# Patient Record
Sex: MALE | Race: WHITE | NOT HISPANIC OR LATINO | Employment: FULL TIME | URBAN - METROPOLITAN AREA
[De-identification: names, ages, dates, MRNs, and addresses within clinical notes are randomized per-mention and may not be internally consistent; named-entity substitution may affect disease eponyms.]

---

## 2017-01-03 ENCOUNTER — COMMUNICATION - HEALTHEAST (OUTPATIENT)
Dept: FAMILY MEDICINE | Facility: CLINIC | Age: 47
End: 2017-01-03

## 2017-02-02 ENCOUNTER — COMMUNICATION - HEALTHEAST (OUTPATIENT)
Dept: FAMILY MEDICINE | Facility: CLINIC | Age: 47
End: 2017-02-02

## 2017-02-02 DIAGNOSIS — N52.9 MALE ERECTILE DISORDER: ICD-10-CM

## 2017-02-14 ENCOUNTER — COMMUNICATION - HEALTHEAST (OUTPATIENT)
Dept: FAMILY MEDICINE | Facility: CLINIC | Age: 47
End: 2017-02-14

## 2017-02-14 DIAGNOSIS — E11.9 TYPE 2 DIABETES MELLITUS (H): ICD-10-CM

## 2017-03-14 ENCOUNTER — COMMUNICATION - HEALTHEAST (OUTPATIENT)
Dept: FAMILY MEDICINE | Facility: CLINIC | Age: 47
End: 2017-03-14

## 2017-03-14 DIAGNOSIS — E11.9 TYPE 2 DIABETES MELLITUS (H): ICD-10-CM

## 2017-03-14 DIAGNOSIS — E78.5 HYPERLIPEMIA: ICD-10-CM

## 2017-03-18 ENCOUNTER — COMMUNICATION - HEALTHEAST (OUTPATIENT)
Dept: FAMILY MEDICINE | Facility: CLINIC | Age: 47
End: 2017-03-18

## 2017-03-18 DIAGNOSIS — E78.5 HYPERLIPEMIA: ICD-10-CM

## 2017-03-20 ENCOUNTER — RECORDS - HEALTHEAST (OUTPATIENT)
Dept: ADMINISTRATIVE | Facility: OTHER | Age: 47
End: 2017-03-20

## 2017-03-29 ENCOUNTER — COMMUNICATION - HEALTHEAST (OUTPATIENT)
Dept: FAMILY MEDICINE | Facility: CLINIC | Age: 47
End: 2017-03-29

## 2017-03-29 DIAGNOSIS — I10 HTN (HYPERTENSION): ICD-10-CM

## 2017-04-22 ENCOUNTER — COMMUNICATION - HEALTHEAST (OUTPATIENT)
Dept: FAMILY MEDICINE | Facility: CLINIC | Age: 47
End: 2017-04-22

## 2017-04-22 DIAGNOSIS — N52.9 MALE ERECTILE DISORDER: ICD-10-CM

## 2017-07-16 ENCOUNTER — COMMUNICATION - HEALTHEAST (OUTPATIENT)
Dept: FAMILY MEDICINE | Facility: CLINIC | Age: 47
End: 2017-07-16

## 2017-07-16 DIAGNOSIS — E11.9 TYPE 2 DIABETES MELLITUS (H): ICD-10-CM

## 2017-09-04 ENCOUNTER — COMMUNICATION - HEALTHEAST (OUTPATIENT)
Dept: FAMILY MEDICINE | Facility: CLINIC | Age: 47
End: 2017-09-04

## 2017-09-04 DIAGNOSIS — E11.9 TYPE 2 DIABETES MELLITUS (H): ICD-10-CM

## 2017-09-04 DIAGNOSIS — E78.5 HYPERLIPEMIA: ICD-10-CM

## 2017-09-26 ENCOUNTER — COMMUNICATION - HEALTHEAST (OUTPATIENT)
Dept: FAMILY MEDICINE | Facility: CLINIC | Age: 47
End: 2017-09-26

## 2017-09-26 DIAGNOSIS — I10 HTN (HYPERTENSION): ICD-10-CM

## 2017-09-30 ENCOUNTER — COMMUNICATION - HEALTHEAST (OUTPATIENT)
Dept: FAMILY MEDICINE | Facility: CLINIC | Age: 47
End: 2017-09-30

## 2017-09-30 DIAGNOSIS — N52.9 MALE ERECTILE DISORDER: ICD-10-CM

## 2017-09-30 DIAGNOSIS — E11.9 TYPE 2 DIABETES MELLITUS (H): ICD-10-CM

## 2017-10-15 ENCOUNTER — COMMUNICATION - HEALTHEAST (OUTPATIENT)
Dept: FAMILY MEDICINE | Facility: CLINIC | Age: 47
End: 2017-10-15

## 2017-10-15 DIAGNOSIS — E11.9 TYPE 2 DIABETES MELLITUS (H): ICD-10-CM

## 2017-11-20 ENCOUNTER — OFFICE VISIT - HEALTHEAST (OUTPATIENT)
Dept: FAMILY MEDICINE | Facility: CLINIC | Age: 47
End: 2017-11-20

## 2017-11-20 ENCOUNTER — COMMUNICATION - HEALTHEAST (OUTPATIENT)
Dept: INTERNAL MEDICINE | Facility: CLINIC | Age: 47
End: 2017-11-20

## 2017-11-20 DIAGNOSIS — I10 ESSENTIAL HYPERTENSION: ICD-10-CM

## 2017-11-20 DIAGNOSIS — E78.5 HYPERLIPIDEMIA: ICD-10-CM

## 2017-11-20 DIAGNOSIS — L40.8 OTHER PSORIASIS: ICD-10-CM

## 2017-11-20 DIAGNOSIS — F52.8 PSYCHOSEXUAL DYSFUNCTION WITH INHIBITED SEXUAL EXCITEMENT: ICD-10-CM

## 2017-11-20 DIAGNOSIS — E11.9 TYPE 2 DIABETES MELLITUS (H): ICD-10-CM

## 2017-11-20 LAB
CHOLEST SERPL-MCNC: 126 MG/DL
FASTING STATUS PATIENT QL REPORTED: YES
HBA1C MFR BLD: 7.4 % (ref 3.5–6)
HDLC SERPL-MCNC: 47 MG/DL
LDLC SERPL CALC-MCNC: 59 MG/DL
TRIGL SERPL-MCNC: 100 MG/DL

## 2017-11-30 ENCOUNTER — COMMUNICATION - HEALTHEAST (OUTPATIENT)
Dept: FAMILY MEDICINE | Facility: CLINIC | Age: 47
End: 2017-11-30

## 2017-11-30 DIAGNOSIS — E78.5 HYPERLIPEMIA: ICD-10-CM

## 2017-12-12 ENCOUNTER — COMMUNICATION - HEALTHEAST (OUTPATIENT)
Dept: FAMILY MEDICINE | Facility: CLINIC | Age: 47
End: 2017-12-12

## 2017-12-12 DIAGNOSIS — I10 HTN (HYPERTENSION): ICD-10-CM

## 2018-01-15 ENCOUNTER — COMMUNICATION - HEALTHEAST (OUTPATIENT)
Dept: FAMILY MEDICINE | Facility: CLINIC | Age: 48
End: 2018-01-15

## 2018-01-15 DIAGNOSIS — E11.9 TYPE 2 DIABETES MELLITUS (H): ICD-10-CM

## 2018-02-10 ENCOUNTER — COMMUNICATION - HEALTHEAST (OUTPATIENT)
Dept: FAMILY MEDICINE | Facility: CLINIC | Age: 48
End: 2018-02-10

## 2018-02-10 DIAGNOSIS — E11.9 TYPE 2 DIABETES MELLITUS (H): ICD-10-CM

## 2018-03-18 ENCOUNTER — COMMUNICATION - HEALTHEAST (OUTPATIENT)
Dept: FAMILY MEDICINE | Facility: CLINIC | Age: 48
End: 2018-03-18

## 2018-03-18 DIAGNOSIS — I10 HTN (HYPERTENSION): ICD-10-CM

## 2018-04-17 ENCOUNTER — COMMUNICATION - HEALTHEAST (OUTPATIENT)
Dept: FAMILY MEDICINE | Facility: CLINIC | Age: 48
End: 2018-04-17

## 2018-04-17 DIAGNOSIS — E11.9 TYPE 2 DIABETES MELLITUS (H): ICD-10-CM

## 2018-05-16 ENCOUNTER — COMMUNICATION - HEALTHEAST (OUTPATIENT)
Dept: FAMILY MEDICINE | Facility: CLINIC | Age: 48
End: 2018-05-16

## 2018-05-16 DIAGNOSIS — E11.9 TYPE 2 DIABETES MELLITUS (H): ICD-10-CM

## 2018-06-14 ENCOUNTER — RECORDS - HEALTHEAST (OUTPATIENT)
Dept: ADMINISTRATIVE | Facility: OTHER | Age: 48
End: 2018-06-14

## 2018-06-15 ENCOUNTER — OFFICE VISIT - HEALTHEAST (OUTPATIENT)
Dept: FAMILY MEDICINE | Facility: CLINIC | Age: 48
End: 2018-06-15

## 2018-06-15 DIAGNOSIS — E11.9 TYPE 2 DIABETES MELLITUS (H): ICD-10-CM

## 2018-06-15 DIAGNOSIS — Z12.11 SCREEN FOR COLON CANCER: ICD-10-CM

## 2018-06-15 DIAGNOSIS — I10 ESSENTIAL HYPERTENSION: ICD-10-CM

## 2018-06-15 DIAGNOSIS — E78.5 HYPERLIPIDEMIA: ICD-10-CM

## 2018-06-15 LAB
ALBUMIN SERPL-MCNC: 3.8 G/DL (ref 3.5–5)
ALP SERPL-CCNC: 65 U/L (ref 45–120)
ALT SERPL W P-5'-P-CCNC: 41 U/L (ref 0–45)
ANION GAP SERPL CALCULATED.3IONS-SCNC: 8 MMOL/L (ref 5–18)
AST SERPL W P-5'-P-CCNC: 32 U/L (ref 0–40)
BILIRUB SERPL-MCNC: 0.6 MG/DL (ref 0–1)
BUN SERPL-MCNC: 6 MG/DL (ref 8–22)
CALCIUM SERPL-MCNC: 9.6 MG/DL (ref 8.5–10.5)
CHLORIDE BLD-SCNC: 104 MMOL/L (ref 98–107)
CHOLEST SERPL-MCNC: 128 MG/DL
CO2 SERPL-SCNC: 26 MMOL/L (ref 22–31)
CREAT SERPL-MCNC: 1.15 MG/DL (ref 0.7–1.3)
CREAT UR-MCNC: 162.2 MG/DL
FASTING STATUS PATIENT QL REPORTED: YES
GFR SERPL CREATININE-BSD FRML MDRD: >60 ML/MIN/1.73M2
GLUCOSE BLD-MCNC: 204 MG/DL (ref 70–125)
HBA1C MFR BLD: 8.5 % (ref 3.5–6)
HDLC SERPL-MCNC: 51 MG/DL
LDLC SERPL CALC-MCNC: 55 MG/DL
MICROALBUMIN UR-MCNC: 1.44 MG/DL (ref 0–1.99)
MICROALBUMIN/CREAT UR: 8.9 MG/G
POTASSIUM BLD-SCNC: 4.9 MMOL/L (ref 3.5–5)
PROT SERPL-MCNC: 6.6 G/DL (ref 6–8)
SODIUM SERPL-SCNC: 138 MMOL/L (ref 136–145)
TRIGL SERPL-MCNC: 108 MG/DL

## 2018-06-18 ENCOUNTER — AMBULATORY - HEALTHEAST (OUTPATIENT)
Dept: FAMILY MEDICINE | Facility: CLINIC | Age: 48
End: 2018-06-18

## 2018-06-18 ENCOUNTER — COMMUNICATION - HEALTHEAST (OUTPATIENT)
Dept: FAMILY MEDICINE | Facility: CLINIC | Age: 48
End: 2018-06-18

## 2018-06-18 DIAGNOSIS — E11.9 TYPE 2 DIABETES MELLITUS (H): ICD-10-CM

## 2018-07-15 ENCOUNTER — COMMUNICATION - HEALTHEAST (OUTPATIENT)
Dept: FAMILY MEDICINE | Facility: CLINIC | Age: 48
End: 2018-07-15

## 2018-07-15 DIAGNOSIS — E11.9 TYPE 2 DIABETES MELLITUS (H): ICD-10-CM

## 2018-08-15 ENCOUNTER — COMMUNICATION - HEALTHEAST (OUTPATIENT)
Dept: FAMILY MEDICINE | Facility: CLINIC | Age: 48
End: 2018-08-15

## 2018-08-15 DIAGNOSIS — E11.9 TYPE 2 DIABETES MELLITUS (H): ICD-10-CM

## 2018-08-22 ENCOUNTER — COMMUNICATION - HEALTHEAST (OUTPATIENT)
Dept: FAMILY MEDICINE | Facility: CLINIC | Age: 48
End: 2018-08-22

## 2018-08-22 DIAGNOSIS — E78.5 HYPERLIPEMIA: ICD-10-CM

## 2018-10-01 ENCOUNTER — COMMUNICATION - HEALTHEAST (OUTPATIENT)
Dept: FAMILY MEDICINE | Facility: CLINIC | Age: 48
End: 2018-10-01

## 2018-10-01 DIAGNOSIS — I10 HTN (HYPERTENSION): ICD-10-CM

## 2018-10-01 DIAGNOSIS — E78.5 HYPERLIPEMIA: ICD-10-CM

## 2018-10-01 DIAGNOSIS — N52.9 MALE ERECTILE DISORDER: ICD-10-CM

## 2018-10-01 DIAGNOSIS — E11.9 TYPE 2 DIABETES MELLITUS (H): ICD-10-CM

## 2018-11-13 ENCOUNTER — COMMUNICATION - HEALTHEAST (OUTPATIENT)
Dept: FAMILY MEDICINE | Facility: CLINIC | Age: 48
End: 2018-11-13

## 2018-11-13 DIAGNOSIS — E11.9 TYPE 2 DIABETES MELLITUS (H): ICD-10-CM

## 2018-12-17 ENCOUNTER — OFFICE VISIT - HEALTHEAST (OUTPATIENT)
Dept: FAMILY MEDICINE | Facility: CLINIC | Age: 48
End: 2018-12-17

## 2018-12-17 DIAGNOSIS — E11.9 TYPE 2 DIABETES MELLITUS WITHOUT COMPLICATION, UNSPECIFIED WHETHER LONG TERM INSULIN USE (H): ICD-10-CM

## 2018-12-17 DIAGNOSIS — Z12.5 SCREENING FOR PROSTATE CANCER: ICD-10-CM

## 2018-12-17 DIAGNOSIS — I10 ESSENTIAL HYPERTENSION: ICD-10-CM

## 2018-12-17 DIAGNOSIS — E78.49 OTHER HYPERLIPIDEMIA: ICD-10-CM

## 2018-12-17 DIAGNOSIS — Z12.11 SCREEN FOR COLON CANCER: ICD-10-CM

## 2018-12-17 LAB
ALBUMIN SERPL-MCNC: 3.9 G/DL (ref 3.5–5)
ALP SERPL-CCNC: 63 U/L (ref 45–120)
ALT SERPL W P-5'-P-CCNC: 41 U/L (ref 0–45)
ANION GAP SERPL CALCULATED.3IONS-SCNC: 13 MMOL/L (ref 5–18)
AST SERPL W P-5'-P-CCNC: 29 U/L (ref 0–40)
BILIRUB SERPL-MCNC: 0.4 MG/DL (ref 0–1)
BUN SERPL-MCNC: 9 MG/DL (ref 8–22)
CALCIUM SERPL-MCNC: 9.4 MG/DL (ref 8.5–10.5)
CHLORIDE BLD-SCNC: 102 MMOL/L (ref 98–107)
CHOLEST SERPL-MCNC: 125 MG/DL
CO2 SERPL-SCNC: 23 MMOL/L (ref 22–31)
CREAT SERPL-MCNC: 1.03 MG/DL (ref 0.7–1.3)
FASTING STATUS PATIENT QL REPORTED: YES
GFR SERPL CREATININE-BSD FRML MDRD: >60 ML/MIN/1.73M2
GLUCOSE BLD-MCNC: 209 MG/DL (ref 70–125)
HBA1C MFR BLD: 8.5 % (ref 3.5–6)
HDLC SERPL-MCNC: 48 MG/DL
LDLC SERPL CALC-MCNC: 55 MG/DL
POTASSIUM BLD-SCNC: 4.5 MMOL/L (ref 3.5–5)
PROT SERPL-MCNC: 6.7 G/DL (ref 6–8)
PSA SERPL-MCNC: 0.4 NG/ML (ref 0–3.5)
SODIUM SERPL-SCNC: 138 MMOL/L (ref 136–145)
TRIGL SERPL-MCNC: 110 MG/DL

## 2018-12-19 ENCOUNTER — AMBULATORY - HEALTHEAST (OUTPATIENT)
Dept: FAMILY MEDICINE | Facility: CLINIC | Age: 48
End: 2018-12-19

## 2018-12-19 ENCOUNTER — COMMUNICATION - HEALTHEAST (OUTPATIENT)
Dept: FAMILY MEDICINE | Facility: CLINIC | Age: 48
End: 2018-12-19

## 2018-12-19 DIAGNOSIS — E11.9 TYPE 2 DIABETES MELLITUS WITHOUT COMPLICATION, UNSPECIFIED WHETHER LONG TERM INSULIN USE (H): ICD-10-CM

## 2018-12-21 ENCOUNTER — COMMUNICATION - HEALTHEAST (OUTPATIENT)
Dept: PHARMACY | Facility: CLINIC | Age: 48
End: 2018-12-21

## 2018-12-26 ENCOUNTER — COMMUNICATION - HEALTHEAST (OUTPATIENT)
Dept: FAMILY MEDICINE | Facility: CLINIC | Age: 48
End: 2018-12-26

## 2018-12-26 DIAGNOSIS — E11.9 TYPE 2 DIABETES MELLITUS (H): ICD-10-CM

## 2019-03-16 ENCOUNTER — COMMUNICATION - HEALTHEAST (OUTPATIENT)
Dept: FAMILY MEDICINE | Facility: CLINIC | Age: 49
End: 2019-03-16

## 2019-03-16 DIAGNOSIS — N52.9 MALE ERECTILE DISORDER: ICD-10-CM

## 2019-03-26 ENCOUNTER — COMMUNICATION - HEALTHEAST (OUTPATIENT)
Dept: FAMILY MEDICINE | Facility: CLINIC | Age: 49
End: 2019-03-26

## 2019-03-26 DIAGNOSIS — I10 HTN (HYPERTENSION): ICD-10-CM

## 2019-06-14 ENCOUNTER — COMMUNICATION - HEALTHEAST (OUTPATIENT)
Dept: FAMILY MEDICINE | Facility: CLINIC | Age: 49
End: 2019-06-14

## 2019-06-14 DIAGNOSIS — E11.9 TYPE 2 DIABETES MELLITUS (H): ICD-10-CM

## 2019-07-08 ENCOUNTER — RECORDS - HEALTHEAST (OUTPATIENT)
Dept: ADMINISTRATIVE | Facility: OTHER | Age: 49
End: 2019-07-08

## 2019-07-10 ENCOUNTER — RECORDS - HEALTHEAST (OUTPATIENT)
Dept: HEALTH INFORMATION MANAGEMENT | Facility: CLINIC | Age: 49
End: 2019-07-10

## 2019-07-12 ENCOUNTER — COMMUNICATION - HEALTHEAST (OUTPATIENT)
Dept: FAMILY MEDICINE | Facility: CLINIC | Age: 49
End: 2019-07-12

## 2019-07-12 ENCOUNTER — OFFICE VISIT - HEALTHEAST (OUTPATIENT)
Dept: FAMILY MEDICINE | Facility: CLINIC | Age: 49
End: 2019-07-12

## 2019-07-12 DIAGNOSIS — E11.9 TYPE 2 DIABETES MELLITUS WITHOUT COMPLICATION, UNSPECIFIED WHETHER LONG TERM INSULIN USE (H): ICD-10-CM

## 2019-07-12 DIAGNOSIS — I10 ESSENTIAL HYPERTENSION: ICD-10-CM

## 2019-07-12 DIAGNOSIS — E78.49 OTHER HYPERLIPIDEMIA: ICD-10-CM

## 2019-07-12 DIAGNOSIS — Z12.11 SCREEN FOR COLON CANCER: ICD-10-CM

## 2019-07-12 LAB
ALBUMIN SERPL-MCNC: 4.2 G/DL (ref 3.5–5)
ALP SERPL-CCNC: 50 U/L (ref 45–120)
ALT SERPL W P-5'-P-CCNC: 22 U/L (ref 0–45)
ANION GAP SERPL CALCULATED.3IONS-SCNC: 10 MMOL/L (ref 5–18)
AST SERPL W P-5'-P-CCNC: 18 U/L (ref 0–40)
BILIRUB SERPL-MCNC: 0.7 MG/DL (ref 0–1)
BUN SERPL-MCNC: 16 MG/DL (ref 8–22)
CALCIUM SERPL-MCNC: 10.3 MG/DL (ref 8.5–10.5)
CHLORIDE BLD-SCNC: 102 MMOL/L (ref 98–107)
CHOLEST SERPL-MCNC: 132 MG/DL
CO2 SERPL-SCNC: 26 MMOL/L (ref 22–31)
CREAT SERPL-MCNC: 1.2 MG/DL (ref 0.7–1.3)
CREAT UR-MCNC: 197.5 MG/DL
FASTING STATUS PATIENT QL REPORTED: YES
GFR SERPL CREATININE-BSD FRML MDRD: >60 ML/MIN/1.73M2
GLUCOSE BLD-MCNC: 141 MG/DL (ref 70–125)
HBA1C MFR BLD: 5.8 % (ref 3.5–6)
HDLC SERPL-MCNC: 53 MG/DL
LDLC SERPL CALC-MCNC: 61 MG/DL
MICROALBUMIN UR-MCNC: 0.61 MG/DL (ref 0–1.99)
MICROALBUMIN/CREAT UR: 3.1 MG/G
POTASSIUM BLD-SCNC: 4.5 MMOL/L (ref 3.5–5)
PROT SERPL-MCNC: 6.9 G/DL (ref 6–8)
SODIUM SERPL-SCNC: 138 MMOL/L (ref 136–145)
TRIGL SERPL-MCNC: 88 MG/DL

## 2019-07-26 ENCOUNTER — COMMUNICATION - HEALTHEAST (OUTPATIENT)
Dept: FAMILY MEDICINE | Facility: CLINIC | Age: 49
End: 2019-07-26

## 2019-08-24 ENCOUNTER — COMMUNICATION - HEALTHEAST (OUTPATIENT)
Dept: FAMILY MEDICINE | Facility: CLINIC | Age: 49
End: 2019-08-24

## 2019-08-24 DIAGNOSIS — E11.9 TYPE 2 DIABETES MELLITUS (H): ICD-10-CM

## 2019-09-12 ENCOUNTER — COMMUNICATION - HEALTHEAST (OUTPATIENT)
Dept: FAMILY MEDICINE | Facility: CLINIC | Age: 49
End: 2019-09-12

## 2019-09-12 DIAGNOSIS — E78.5 HYPERLIPEMIA: ICD-10-CM

## 2019-09-20 ENCOUNTER — COMMUNICATION - HEALTHEAST (OUTPATIENT)
Dept: FAMILY MEDICINE | Facility: CLINIC | Age: 49
End: 2019-09-20

## 2019-09-20 DIAGNOSIS — I10 HTN (HYPERTENSION): ICD-10-CM

## 2019-12-06 ENCOUNTER — COMMUNICATION - HEALTHEAST (OUTPATIENT)
Dept: FAMILY MEDICINE | Facility: CLINIC | Age: 49
End: 2019-12-06

## 2019-12-06 DIAGNOSIS — E11.9 TYPE 2 DIABETES MELLITUS (H): ICD-10-CM

## 2020-06-04 ENCOUNTER — COMMUNICATION - HEALTHEAST (OUTPATIENT)
Dept: FAMILY MEDICINE | Facility: CLINIC | Age: 50
End: 2020-06-04

## 2020-06-04 DIAGNOSIS — E11.9 TYPE 2 DIABETES MELLITUS (H): ICD-10-CM

## 2020-06-15 ENCOUNTER — COMMUNICATION - HEALTHEAST (OUTPATIENT)
Dept: FAMILY MEDICINE | Facility: CLINIC | Age: 50
End: 2020-06-15

## 2020-06-15 DIAGNOSIS — N52.9 MALE ERECTILE DISORDER: ICD-10-CM

## 2020-07-02 ENCOUNTER — COMMUNICATION - HEALTHEAST (OUTPATIENT)
Dept: FAMILY MEDICINE | Facility: CLINIC | Age: 50
End: 2020-07-02

## 2020-07-02 DIAGNOSIS — E11.9 TYPE 2 DIABETES MELLITUS (H): ICD-10-CM

## 2020-07-13 ENCOUNTER — OFFICE VISIT - HEALTHEAST (OUTPATIENT)
Dept: FAMILY MEDICINE | Facility: CLINIC | Age: 50
End: 2020-07-13

## 2020-07-13 DIAGNOSIS — N52.9 MALE ERECTILE DISORDER: ICD-10-CM

## 2020-07-13 DIAGNOSIS — I10 ESSENTIAL HYPERTENSION: ICD-10-CM

## 2020-07-13 DIAGNOSIS — Z12.11 SCREEN FOR COLON CANCER: ICD-10-CM

## 2020-07-13 DIAGNOSIS — E11.9 TYPE 2 DIABETES MELLITUS WITHOUT COMPLICATION, UNSPECIFIED WHETHER LONG TERM INSULIN USE (H): ICD-10-CM

## 2020-07-13 DIAGNOSIS — F52.8 PSYCHOSEXUAL DYSFUNCTION WITH INHIBITED SEXUAL EXCITEMENT: ICD-10-CM

## 2020-07-13 DIAGNOSIS — E78.49 OTHER HYPERLIPIDEMIA: ICD-10-CM

## 2020-07-13 RX ORDER — TADALAFIL 20 MG/1
TABLET ORAL
Qty: 8 TABLET | Refills: 5 | Status: SHIPPED | OUTPATIENT
Start: 2020-07-13 | End: 2021-08-20

## 2020-08-04 ENCOUNTER — COMMUNICATION - HEALTHEAST (OUTPATIENT)
Dept: FAMILY MEDICINE | Facility: CLINIC | Age: 50
End: 2020-08-04

## 2020-08-04 DIAGNOSIS — E11.9 TYPE 2 DIABETES MELLITUS (H): ICD-10-CM

## 2020-08-14 ENCOUNTER — COMMUNICATION - HEALTHEAST (OUTPATIENT)
Dept: FAMILY MEDICINE | Facility: CLINIC | Age: 50
End: 2020-08-14

## 2020-08-14 ENCOUNTER — AMBULATORY - HEALTHEAST (OUTPATIENT)
Dept: LAB | Facility: CLINIC | Age: 50
End: 2020-08-14

## 2020-08-14 DIAGNOSIS — E11.9 TYPE 2 DIABETES MELLITUS WITHOUT COMPLICATION, UNSPECIFIED WHETHER LONG TERM INSULIN USE (H): ICD-10-CM

## 2020-08-14 DIAGNOSIS — E78.49 OTHER HYPERLIPIDEMIA: ICD-10-CM

## 2020-08-14 DIAGNOSIS — E11.9 TYPE 2 DIABETES MELLITUS (H): ICD-10-CM

## 2020-08-14 LAB
ALBUMIN SERPL-MCNC: 4 G/DL (ref 3.5–5)
ALP SERPL-CCNC: 53 U/L (ref 45–120)
ALT SERPL W P-5'-P-CCNC: 25 U/L (ref 0–45)
ANION GAP SERPL CALCULATED.3IONS-SCNC: 5 MMOL/L (ref 5–18)
AST SERPL W P-5'-P-CCNC: 16 U/L (ref 0–40)
BILIRUB SERPL-MCNC: 0.5 MG/DL (ref 0–1)
BUN SERPL-MCNC: 10 MG/DL (ref 8–22)
CALCIUM SERPL-MCNC: 9.3 MG/DL (ref 8.5–10.5)
CHLORIDE BLD-SCNC: 103 MMOL/L (ref 98–107)
CHOLEST SERPL-MCNC: 137 MG/DL
CO2 SERPL-SCNC: 30 MMOL/L (ref 22–31)
CREAT SERPL-MCNC: 1.09 MG/DL (ref 0.7–1.3)
FASTING STATUS PATIENT QL REPORTED: YES
GFR SERPL CREATININE-BSD FRML MDRD: >60 ML/MIN/1.73M2
GLUCOSE BLD-MCNC: 169 MG/DL (ref 70–125)
HBA1C MFR BLD: 7.4 %
HDLC SERPL-MCNC: 53 MG/DL
LDLC SERPL CALC-MCNC: 61 MG/DL
POTASSIUM BLD-SCNC: 4.6 MMOL/L (ref 3.5–5)
PROT SERPL-MCNC: 6.6 G/DL (ref 6–8)
SODIUM SERPL-SCNC: 138 MMOL/L (ref 136–145)
TRIGL SERPL-MCNC: 115 MG/DL

## 2020-08-14 RX ORDER — GLIPIZIDE 10 MG/1
TABLET, FILM COATED, EXTENDED RELEASE ORAL
Qty: 180 TABLET | Refills: 3 | Status: SHIPPED | OUTPATIENT
Start: 2020-08-14 | End: 2021-08-20

## 2020-08-16 LAB
CREAT UR-MCNC: 76.2 MG/DL
MICROALBUMIN UR-MCNC: <0.5 MG/DL (ref 0–1.99)
MICROALBUMIN/CREAT UR: NORMAL MG/G{CREAT}

## 2020-08-17 ENCOUNTER — COMMUNICATION - HEALTHEAST (OUTPATIENT)
Dept: FAMILY MEDICINE | Facility: CLINIC | Age: 50
End: 2020-08-17

## 2020-09-11 ENCOUNTER — COMMUNICATION - HEALTHEAST (OUTPATIENT)
Dept: SCHEDULING | Facility: CLINIC | Age: 50
End: 2020-09-11

## 2020-09-11 DIAGNOSIS — E78.5 HYPERLIPEMIA: ICD-10-CM

## 2020-09-12 RX ORDER — SIMVASTATIN 40 MG
40 TABLET ORAL DAILY
Qty: 90 TABLET | Refills: 3 | Status: SHIPPED | OUTPATIENT
Start: 2020-09-12 | End: 2021-08-20

## 2020-09-18 ENCOUNTER — COMMUNICATION - HEALTHEAST (OUTPATIENT)
Dept: FAMILY MEDICINE | Facility: CLINIC | Age: 50
End: 2020-09-18

## 2020-09-18 DIAGNOSIS — I10 HTN (HYPERTENSION): ICD-10-CM

## 2020-09-21 RX ORDER — LISINOPRIL 20 MG/1
20 TABLET ORAL DAILY
Qty: 90 TABLET | Refills: 3 | Status: SHIPPED | OUTPATIENT
Start: 2020-09-21 | End: 2021-08-20

## 2020-10-30 ENCOUNTER — COMMUNICATION - HEALTHEAST (OUTPATIENT)
Dept: FAMILY MEDICINE | Facility: CLINIC | Age: 50
End: 2020-10-30

## 2020-10-30 DIAGNOSIS — E11.9 TYPE 2 DIABETES MELLITUS (H): ICD-10-CM

## 2020-12-09 ENCOUNTER — COMMUNICATION - HEALTHEAST (OUTPATIENT)
Dept: FAMILY MEDICINE | Facility: CLINIC | Age: 50
End: 2020-12-09

## 2021-01-25 ENCOUNTER — OFFICE VISIT - HEALTHEAST (OUTPATIENT)
Dept: FAMILY MEDICINE | Facility: CLINIC | Age: 51
End: 2021-01-25

## 2021-01-25 DIAGNOSIS — E11.9 TYPE 2 DIABETES MELLITUS WITHOUT COMPLICATION, UNSPECIFIED WHETHER LONG TERM INSULIN USE (H): ICD-10-CM

## 2021-01-25 DIAGNOSIS — I10 ESSENTIAL HYPERTENSION: ICD-10-CM

## 2021-01-25 DIAGNOSIS — E78.49 OTHER HYPERLIPIDEMIA: ICD-10-CM

## 2021-01-25 DIAGNOSIS — Z12.11 SCREEN FOR COLON CANCER: ICD-10-CM

## 2021-01-25 LAB
ALBUMIN SERPL-MCNC: 4.2 G/DL (ref 3.5–5)
ALP SERPL-CCNC: 56 U/L (ref 45–120)
ALT SERPL W P-5'-P-CCNC: 19 U/L (ref 0–45)
ANION GAP SERPL CALCULATED.3IONS-SCNC: 8 MMOL/L (ref 5–18)
AST SERPL W P-5'-P-CCNC: 16 U/L (ref 0–40)
BILIRUB SERPL-MCNC: 0.9 MG/DL (ref 0–1)
BUN SERPL-MCNC: 10 MG/DL (ref 8–22)
CALCIUM SERPL-MCNC: 9.6 MG/DL (ref 8.5–10.5)
CHLORIDE BLD-SCNC: 102 MMOL/L (ref 98–107)
CHOLEST SERPL-MCNC: 131 MG/DL
CO2 SERPL-SCNC: 29 MMOL/L (ref 22–31)
CREAT SERPL-MCNC: 1.16 MG/DL (ref 0.7–1.3)
FASTING STATUS PATIENT QL REPORTED: YES
GFR SERPL CREATININE-BSD FRML MDRD: >60 ML/MIN/1.73M2
GLUCOSE BLD-MCNC: 151 MG/DL (ref 70–125)
HBA1C MFR BLD: 7 %
HDLC SERPL-MCNC: 51 MG/DL
LDLC SERPL CALC-MCNC: 58 MG/DL
POTASSIUM BLD-SCNC: 4.3 MMOL/L (ref 3.5–5)
PROT SERPL-MCNC: 6.8 G/DL (ref 6–8)
SODIUM SERPL-SCNC: 139 MMOL/L (ref 136–145)
TRIGL SERPL-MCNC: 111 MG/DL

## 2021-01-26 ENCOUNTER — COMMUNICATION - HEALTHEAST (OUTPATIENT)
Dept: INTERNAL MEDICINE | Facility: CLINIC | Age: 51
End: 2021-01-26

## 2021-03-29 ENCOUNTER — COMMUNICATION - HEALTHEAST (OUTPATIENT)
Dept: FAMILY MEDICINE | Facility: CLINIC | Age: 51
End: 2021-03-29

## 2021-05-27 NOTE — TELEPHONE ENCOUNTER
Refill Approved    Rx renewed per Medication Renewal Policy. Medication was last renewed on 10/3/18.    Kim Simmons, [facility] Triage/Med Refill 3/26/2019     Requested Prescriptions   Pending Prescriptions Disp Refills     lisinopril (PRINIVIL,ZESTRIL) 20 MG tablet 90 tablet 2     Sig: Take 1 tablet (20 mg total) by mouth daily.    Ace Inhibitors Refill Protocol Passed - 3/26/2019  1:10 PM       Passed - PCP or prescribing provider visit in past 12 months      Last office visit with prescriber/PCP: 12/17/2018 Andres Dc MD OR same dept: 12/17/2018 Andres Dc MD OR same specialty: 12/17/2018 Andres Dc MD  Last physical: Visit date not found Last MTM visit: Visit date not found   Next visit within 3 mo: Visit date not found  Next physical within 3 mo: Visit date not found  Prescriber OR PCP: Andres Dc MD  Last diagnosis associated with med order: 1. HTN (hypertension)  - lisinopril (PRINIVIL,ZESTRIL) 20 MG tablet; Take 1 tablet (20 mg total) by mouth daily.  Dispense: 90 tablet; Refill: 2    If protocol passes may refill for 12 months if within 3 months of last provider visit (or a total of 15 months).            Passed - Serum Potassium in past 12 months    Lab Results   Component Value Date    Potassium 4.5 12/17/2018            Passed - Blood pressure filed in past 12 months    BP Readings from Last 1 Encounters:   12/17/18 118/82            Passed - Serum Creatinine in past 12 months    Creatinine   Date Value Ref Range Status   12/17/2018 1.03 0.70 - 1.30 mg/dL Final

## 2021-05-29 ENCOUNTER — RECORDS - HEALTHEAST (OUTPATIENT)
Dept: ADMINISTRATIVE | Facility: CLINIC | Age: 51
End: 2021-05-29

## 2021-05-29 NOTE — TELEPHONE ENCOUNTER
Refill Approved    Rx renewed per Medication Renewal Policy. Medication was last renewed on 12/27/18  #180 R-1.    Last OV 12/17/18    Mayda Grigsby, [facility] Triage/Med Refill 6/17/2019     Requested Prescriptions   Pending Prescriptions Disp Refills     metFORMIN (GLUCOPHAGE) 1000 MG tablet [Pharmacy Med Name: METFORMIN HCL 1,000 MG TABLET] 180 tablet 0     Sig: TAKE 1 TABLET BY MOUTH TWICE A DAY WITH FOOD       Metformin Refill Protocol Passed - 6/14/2019 11:38 AM        Passed - Blood pressure in last 12 months     BP Readings from Last 1 Encounters:   12/17/18 118/82             Passed - LFT or AST or ALT in last 12 months     Albumin   Date Value Ref Range Status   12/17/2018 3.9 3.5 - 5.0 g/dL Final     Bilirubin, Total   Date Value Ref Range Status   12/17/2018 0.4 0.0 - 1.0 mg/dL Final     Alkaline Phosphatase   Date Value Ref Range Status   12/17/2018 63 45 - 120 U/L Final     AST   Date Value Ref Range Status   12/17/2018 29 0 - 40 U/L Final     ALT   Date Value Ref Range Status   12/17/2018 41 0 - 45 U/L Final     Protein, Total   Date Value Ref Range Status   12/17/2018 6.7 6.0 - 8.0 g/dL Final                Passed - GFR or Serum Creatinine in last 6 months     GFR MDRD Non Af Amer   Date Value Ref Range Status   12/17/2018 >60 >60 mL/min/1.73m2 Final     GFR MDRD Af Amer   Date Value Ref Range Status   12/17/2018 >60 >60 mL/min/1.73m2 Final             Passed - Visit with PCP or prescribing provider visit in last 6 months or next 3 months     Last office visit with prescriber/PCP: 12/17/2018 OR same dept: 12/17/2018 Andres Dc MD OR same specialty: 12/17/2018 Andres Dc MD Last physical: Visit date not found Last MTM visit: Visit date not found         Next appt within 3 mo: Visit date not found  Next physical within 3 mo: Visit date not found  Prescriber OR PCP: Andres Dc MD  Last diagnosis associated with med order: 1. Type 2 diabetes mellitus (H)  - metFORMIN (GLUCOPHAGE)  1000 MG tablet [Pharmacy Med Name: METFORMIN HCL 1,000 MG TABLET]; TAKE 1 TABLET BY MOUTH TWICE A DAY WITH FOOD  Dispense: 180 tablet; Refill: 0     If protocol passes may refill for 12 months if within 3 months of last provider visit (or a total of 15 months).           Passed - A1C in last 6 months     Hemoglobin A1c   Date Value Ref Range Status   12/17/2018 8.5 (H) 3.5 - 6.0 % Final               Passed - Microalbumin in last year      Microalbumin, Random Urine   Date Value Ref Range Status   06/15/2018 1.44 0.00 - 1.99 mg/dL Final

## 2021-05-30 NOTE — PATIENT INSTRUCTIONS - HE
I will comment on laboratory studies when all available, I am very impressed and pleased with changes and improvements to your health, see us again in 6 months.

## 2021-05-30 NOTE — PROGRESS NOTES
ASSESSMENT:  1. Type 2 diabetes mellitus  (H)  Patient has made significant improvements in diet and exercise, his A1c went from 8.5-5.8.  - Glycosylated Hemoglobin A1c  - Microalbumin, Random Urine    2. Hypertension  Well-controlled.    3. Hyperlipidemia  Historically has been well controlled.  - Comprehensive Metabolic Panel  - Lipid Cascade    4. Screen for colon cancer     - Ambulatory referral for Colonoscopy        PLAN:  1.  Laboratory studies as above.  2.  Referral for colonoscopy.  3.  Discontinue or hold glipizide for now but continue metformin.  4.  For now I am going to have the patient continue the lisinopril and atorvastatin  5.  Recommend the patient discontinue aspirin and all supplements.  6.  6-month follow-up.    Orders Placed This Encounter   Procedures     Comprehensive Metabolic Panel     Lipid Cascade     Order Specific Question:   Fasting is required?     Answer:   Yes     Glycosylated Hemoglobin A1c     Microalbumin, Random Urine     Ambulatory referral for Colonoscopy     Referral Priority:   Routine     Referral Type:   Colonoscopy     Referral Reason:   Evaluation and Treatment     Requested Specialty:   Gastroenterology     Number of Visits Requested:   1     Medications Discontinued During This Encounter   Medication Reason     metFORMIN (GLUCOPHAGE) 1000 MG tablet Duplicate order     CIALIS 20 mg tablet Therapy completed     aspirin 81 MG EC tablet Therapy completed     CALCIUM CARBONATE (CALCIUM 500 ORAL) Therapy completed     cholecalciferol, vitamin D3, 2,000 unit Tab Therapy completed     MULTIVITAMIN ORAL Therapy completed     glipiZIDE (GLUCOTROL XL) 10 MG 24 hr tablet Therapy completed       Return in about 6 months (around 1/12/2020) for Recheck.    CHIEF COMPLAINT:  Chief Complaint   Patient presents with     Diabetes     recheck        SUBJECTIVE:  Bob is a 49 y.o. male who comes in for follow-up diabetes mellitus.  The patient was seen in December his A1c was 5, since  that time he has lost a considerable amount of weight 50 pounds he is made significant improvements in his diet he is overall eating much better.  Patient never did go on a GLP-1 agonist, he would be willing to do so if his A1c still stays high but I think is likely will improve.  Eating fruits and vegetables he is cut out a lot of sugars and is exercising more and better.    His blood pressure remains well controlled.    Does need a colonoscopy and is willing to go through with this at this time.    No other significant change in his health he has not had any recent illnesses or sicknesses.    REVIEW OF SYSTEMS:      All other systems are negative.    PFSH:  Immunization History   Administered Date(s) Administered     DT (pediatric) 01/01/1999     Hep A, Adult IM (19yr & older) 12/14/2007, 07/21/2008     Hep A, historic 12/14/2007, 07/21/2008     Influenza [id number], 03/17/2010, 03/17/2010     Influenza, Seasonal, Inj PF IIV3 09/29/2010, 12/27/2011     Influenza, inj, historic,unspecified 10/12/2005, 12/14/2007, 09/30/2015, 12/30/2016     Influenza, seasonal,quad inj 36+ mos 09/30/2015, 12/30/2016, 11/20/2017     Influenza, seasonal,quad inj 6-35 mos 10/23/2013, 12/12/2014     Influenza,seasonal quad, PF, 36+MOS 12/17/2018     Influenza,seasonal, Inj IIV3 10/12/2005, 02/02/2007, 12/14/2007, 10/23/2013, 12/12/2014     Pneumo Conj 13-V (2010&after) 12/12/2014     Pneumo Polysac 23-V 08/25/2009     Td,adult,historic,unspecified 12/14/2007     Tdap 12/14/2007, 11/20/2017     Social History     Socioeconomic History     Marital status:      Spouse name: Not on file     Number of children: 3     Years of education: Not on file     Highest education level: Not on file   Occupational History     Occupation: IT Executive   Social Needs     Financial resource strain: Not on file     Food insecurity:     Worry: Not on file     Inability: Not on file     Transportation needs:     Medical: Not on file     Non-medical: Not  on file   Tobacco Use     Smoking status: Never Smoker     Smokeless tobacco: Never Used   Substance and Sexual Activity     Alcohol use: Yes     Comment: Rare     Drug use: No     Sexual activity: Yes     Partners: Female   Lifestyle     Physical activity:     Days per week: Not on file     Minutes per session: Not on file     Stress: Not on file   Relationships     Social connections:     Talks on phone: Not on file     Gets together: Not on file     Attends [employer] service: Not on file     Active member of club or organization: Not on file     Attends meetings of clubs or organizations: Not on file     Relationship status: Not on file     Intimate partner violence:     Fear of current or ex partner: Not on file     Emotionally abused: Not on file     Physically abused: Not on file     Forced sexual activity: Not on file   Other Topics Concern     Not on file   Social History Narrative    Diet-eating well        Exercise- Walking, some running, gym, basektball     Past Medical History:   Diagnosis Date     Anxiety     Previously on Zoloft     Family History   Problem Relation Age of Onset     Hyperlipidemia Father      Prostate cancer Father 52     Psoriasis Father         Severe-with arthitis disabled      Coronary artery disease Maternal Grandmother      Prostate cancer Maternal Grandfather      Diabetes type II Paternal Grandmother      Aneurysm Paternal Grandfather         Cardiovascular     Diabetes type II Paternal Uncle      Diabetes type II Paternal Uncle      Diabetes type II Paternal Uncle      Diabetes type II Brother      Hypertension Brother        MEDICATIONS:  Current Outpatient Medications   Medication Sig Dispense Refill     lisinopril (PRINIVIL,ZESTRIL) 20 MG tablet Take 1 tablet (20 mg total) by mouth daily. 90 tablet 1     metFORMIN (GLUCOPHAGE) 1000 MG tablet TAKE 1 TABLET(1000 MG) BY MOUTH TWICE DAILY WITH MEALS 180 tablet 1     simvastatin (ZOCOR) 40 MG tablet Take 1 tablet (40 mg total)  by mouth daily. 90 tablet 2     tadalafil (CIALIS) 20 MG tablet TAKE ONE-HALF TO ONE TABLET BY MOUTH DAILY AS NEEDED 4 tablet 3     No current facility-administered medications for this visit.        TOBACCO USE:  Social History     Tobacco Use   Smoking Status Never Smoker   Smokeless Tobacco Never Used       VITALS:  Vitals:    07/12/19 0745   BP: 106/67   Pulse: 73   SpO2: 99%   Weight: (!) 302 lb 6.4 oz (137.2 kg)     Wt Readings from Last 3 Encounters:   07/12/19 (!) 302 lb 6.4 oz (137.2 kg)   12/17/18 (!) 352 lb 6.4 oz (159.8 kg)   06/15/18 (!) 354 lb (160.6 kg)       PHYSICAL EXAM:  Constitutional:   Reveals a nail who appears overall healthy.  Vitals: per nursing notes.  HEENT:  Ears:  External canals, TMs clear.    Eyes:  EOMs full, PERRL.  Lungs: Clear to A&P without rales or wheezes.  Respiratory effort normal.  Cardiac:   Regular rate and rhythm, normal S1, S2, no murmur or gallop.  Musculoskeletal: No peripheral swelling.  Neuro:  Alert and oriented. Cranial nerves, motor, sensory exams are intact.  No gross focal deficits.  Psychiatric:  Memory intact, mood appropriate.    QUALITY MEASURES:      DATA REVIEWED:

## 2021-05-31 VITALS — BODY MASS INDEX: 39.98 KG/M2 | WEIGHT: 315 LBS

## 2021-05-31 NOTE — TELEPHONE ENCOUNTER
RN cannot approve Refill Request    RN can NOT refill this medication medication not on med list. Last office visit: 7/12/2019 Andres Dc MD Last Physical: Visit date not found Last MTM visit: Visit date not found Last visit same specialty: 7/12/2019 Andres Dc MD.  Next visit within 3 mo: Visit date not found  Next physical within 3 mo: Visit date not found      Shanthi Crump, [facility] Triage/Med Refill 8/25/2019    Requested Prescriptions   Pending Prescriptions Disp Refills     glipiZIDE (GLUCOTROL XL) 10 MG 24 hr tablet [Pharmacy Med Name: GLIPIZIDE ER 10 MG TABLET] 180 tablet 3     Sig: TAKE 1 TABLET BY MOUTH TWICE DAILY       Oral Hypoglycemics Refill Protocol Passed - 8/24/2019  8:56 AM        Passed - Visit with PCP or prescribing provider visit in last 6 months       Last office visit with prescriber/PCP: 7/12/2019 OR same dept: 7/12/2019 Andres Dc MD OR same specialty: 7/12/2019 Andres Dc MD Last physical: Visit date not found Last MTM visit: Visit date not found         Next appt within 3 mo: Visit date not found  Next physical within 3 mo: Visit date not found  Prescriber OR PCP: Andres Dc MD  Last diagnosis associated with med order: 1. Type 2 diabetes mellitus (H)  - glipiZIDE (GLUCOTROL XL) 10 MG 24 hr tablet [Pharmacy Med Name: GLIPIZIDE ER 10 MG TABLET]; TAKE 1 TABLET BY MOUTH TWICE DAILY  Dispense: 180 tablet; Refill: 3     If protocol passes may refill for 12 months if within 3 months of last provider visit (or a total of 15 months).           Passed - A1C in last 6 months     Hemoglobin A1c   Date Value Ref Range Status   07/12/2019 5.8 3.5 - 6.0 % Final               Passed - Microalbumin in last year      Microalbumin, Random Urine   Date Value Ref Range Status   07/12/2019 0.61 0.00 - 1.99 mg/dL Final                  Passed - Blood pressure in last year     BP Readings from Last 1 Encounters:   07/12/19 106/67             Passed - Serum  creatinine in last year     Creatinine   Date Value Ref Range Status   07/12/2019 1.20 0.70 - 1.30 mg/dL Final

## 2021-06-01 VITALS — WEIGHT: 315 LBS | BODY MASS INDEX: 40.91 KG/M2

## 2021-06-01 NOTE — TELEPHONE ENCOUNTER
Refill Approved    Rx renewed per Medication Renewal Policy. Medication was last renewed on 3/26/2019.  Last OV 7/12/2019.    Janey Peerz, [facility] Triage/Med Refill 9/20/2019     Requested Prescriptions   Pending Prescriptions Disp Refills     lisinopril (PRINIVIL,ZESTRIL) 20 MG tablet [Pharmacy Med Name: LISINOPRIL 20 MG TABLET] 90 tablet 1     Sig: TAKE 1 TABLET BY MOUTH EVERY DAY       Ace Inhibitors Refill Protocol Passed - 9/20/2019  1:33 AM        Passed - PCP or prescribing provider visit in past 12 months       Last office visit with prescriber/PCP: 7/12/2019 Andres Dc MD OR same dept: 7/12/2019 Andres Dc MD OR same specialty: 7/12/2019 Andres Dc MD  Last physical: Visit date not found Last MTM visit: Visit date not found   Next visit within 3 mo: Visit date not found  Next physical within 3 mo: Visit date not found  Prescriber OR PCP: Andres Dc MD  Last diagnosis associated with med order: 1. HTN (hypertension)  - lisinopril (PRINIVIL,ZESTRIL) 20 MG tablet [Pharmacy Med Name: LISINOPRIL 20 MG TABLET]; TAKE 1 TABLET BY MOUTH EVERY DAY  Dispense: 90 tablet; Refill: 1    If protocol passes may refill for 12 months if within 3 months of last provider visit (or a total of 15 months).             Passed - Serum Potassium in past 12 months     Lab Results   Component Value Date    Potassium 4.5 07/12/2019             Passed - Blood pressure filed in past 12 months     BP Readings from Last 1 Encounters:   07/12/19 106/67             Passed - Serum Creatinine in past 12 months     Creatinine   Date Value Ref Range Status   07/12/2019 1.20 0.70 - 1.30 mg/dL Final

## 2021-06-01 NOTE — TELEPHONE ENCOUNTER
Refill Approved    Rx renewed per Medication Renewal Policy. Medication was last renewed on 10/3/2018 with 2 refills.  Last office visit:7/12/2019 with PCP Dr SANKET Dc   Change in pharmacy noted.    Rin Diallo, [facility] Triage/Med Refill 9/12/2019     Requested Prescriptions   Pending Prescriptions Disp Refills     simvastatin (ZOCOR) 40 MG tablet [Pharmacy Med Name: SIMVASTATIN 40 MG TABLET] 90 tablet 2     Sig: TAKE 1 TABLET BY MOUTH EVERY DAY       Statins Refill Protocol (Hmg CoA Reductase Inhibitors) Passed - 9/12/2019  8:58 AM        Passed - PCP or prescribing provider visit in past 12 months      Last office visit with prescriber/PCP: 7/12/2019 Andres Dc MD OR same dept: 7/12/2019 Andres Dc MD OR same specialty: 7/12/2019 Andres Dc MD  Last physical: Visit date not found Last MTM visit: Visit date not found   Next visit within 3 mo: Visit date not found  Next physical within 3 mo: Visit date not found  Prescriber OR PCP: Andres Dc MD  Last diagnosis associated with med order: 1. Hyperlipemia  - simvastatin (ZOCOR) 40 MG tablet [Pharmacy Med Name: SIMVASTATIN 40 MG TABLET]; TAKE 1 TABLET BY MOUTH EVERY DAY  Dispense: 90 tablet; Refill: 2    If protocol passes may refill for 12 months if within 3 months of last provider visit (or a total of 15 months).

## 2021-06-02 VITALS — BODY MASS INDEX: 40.72 KG/M2 | WEIGHT: 315 LBS

## 2021-06-03 VITALS — BODY MASS INDEX: 34.95 KG/M2 | WEIGHT: 302.4 LBS

## 2021-06-05 VITALS
HEART RATE: 71 BPM | DIASTOLIC BLOOD PRESSURE: 73 MMHG | BODY MASS INDEX: 38.59 KG/M2 | WEIGHT: 315 LBS | SYSTOLIC BLOOD PRESSURE: 110 MMHG

## 2021-06-08 NOTE — TELEPHONE ENCOUNTER
RN cannot approve Refill Request    RN can NOT refill this medication PCP messaged that patient is overdue for Labs and Office Visit. Last office visit: 7/12/2019 Andres Dc MD Last Physical: Visit date not found Last MTM visit: Visit date not found Last visit same specialty: 7/12/2019 Andres Dc MD.  Next visit within 3 mo: Visit date not found  Next physical within 3 mo: Visit date not found      Yasmine Turcios, [facility] Triage/Med Refill 6/7/2020    Requested Prescriptions   Pending Prescriptions Disp Refills     metFORMIN (GLUCOPHAGE) 1000 MG tablet [Pharmacy Med Name: METFORMIN HCL 1,000 MG TABLET] 180 tablet 1     Sig: TAKE 1 TABLET BY MOUTH TWICE A DAY WITH FOOD       Metformin Refill Protocol Failed - 6/4/2020 12:14 AM        Failed - Visit with PCP or prescribing provider visit in last 6 months or next 3 months     Last office visit with prescriber/PCP: Visit date not found OR same dept: 7/12/2019 Andres Dc MD OR same specialty: 7/12/2019 Andres Dc MD Last physical: Visit date not found Last MTM visit: Visit date not found         Next appt within 3 mo: Visit date not found  Next physical within 3 mo: Visit date not found  Prescriber OR PCP: Andres Dc MD  Last diagnosis associated with med order: 1. Type 2 diabetes mellitus (H)  - metFORMIN (GLUCOPHAGE) 1000 MG tablet [Pharmacy Med Name: METFORMIN HCL 1,000 MG TABLET]; TAKE 1 TABLET BY MOUTH TWICE A DAY WITH FOOD  Dispense: 180 tablet; Refill: 1     If protocol passes may refill for 12 months if within 3 months of last provider visit (or a total of 15 months).           Failed - A1C in last 6 months     Hemoglobin A1c   Date Value Ref Range Status   07/12/2019 5.8 3.5 - 6.0 % Final               Passed - Blood pressure in last 12 months     BP Readings from Last 1 Encounters:   07/12/19 106/67             Passed - LFT or AST or ALT in last 12 months     Albumin   Date Value Ref Range Status   07/12/2019 4.2 3.5  - 5.0 g/dL Final     Bilirubin, Total   Date Value Ref Range Status   07/12/2019 0.7 0.0 - 1.0 mg/dL Final     Alkaline Phosphatase   Date Value Ref Range Status   07/12/2019 50 45 - 120 U/L Final     AST   Date Value Ref Range Status   07/12/2019 18 0 - 40 U/L Final     ALT   Date Value Ref Range Status   07/12/2019 22 0 - 45 U/L Final     Protein, Total   Date Value Ref Range Status   07/12/2019 6.9 6.0 - 8.0 g/dL Final                Passed - GFR or Serum Creatinine in last 6 months     GFR MDRD Non Af Amer   Date Value Ref Range Status   07/12/2019 >60 >60 mL/min/1.73m2 Final     GFR MDRD Af Amer   Date Value Ref Range Status   07/12/2019 >60 >60 mL/min/1.73m2 Final             Passed - Microalbumin in last year      Microalbumin, Random Urine   Date Value Ref Range Status   07/12/2019 0.61 0.00 - 1.99 mg/dL Final

## 2021-06-09 NOTE — TELEPHONE ENCOUNTER
RN cannot approve Refill Request    RN can NOT refill this medication PCP messaged that patient is overdue for Labs and Office Visit. Last office visit: 7/12/2019 Andres Dc MD Last Physical: Visit date not found Last MTM visit: Visit date not found Last visit same specialty: 7/12/2019 Anders Dc MD.  Next visit within 3 mo: Visit date not found  Next physical within 3 mo: Visit date not found      Yasmine Turcios, [facility] Triage/Med Refill 7/3/2020    Requested Prescriptions   Pending Prescriptions Disp Refills     metFORMIN (GLUCOPHAGE) 1000 MG tablet [Pharmacy Med Name: METFORMIN HCL 1,000 MG TABLET] 60 tablet 0     Sig: TAKE 1 TABLET BY MOUTH TWICE A DAY WITH FOOD       Metformin Refill Protocol Failed - 7/2/2020 10:37 AM        Failed - Visit with PCP or prescribing provider visit in last 6 months or next 3 months     Last office visit with prescriber/PCP: Visit date not found OR same dept: 7/12/2019 Andres Dc MD OR same specialty: 7/12/2019 Andres Dc MD Last physical: Visit date not found Last MTM visit: Visit date not found         Next appt within 3 mo: Visit date not found  Next physical within 3 mo: Visit date not found  Prescriber OR PCP: Andres Dc MD  Last diagnosis associated with med order: 1. Type 2 diabetes mellitus (H)  - metFORMIN (GLUCOPHAGE) 1000 MG tablet [Pharmacy Med Name: METFORMIN HCL 1,000 MG TABLET]; TAKE 1 TABLET BY MOUTH TWICE A DAY WITH FOOD  Dispense: 60 tablet; Refill: 0     If protocol passes may refill for 12 months if within 3 months of last provider visit (or a total of 15 months).           Failed - A1C in last 6 months     Hemoglobin A1c   Date Value Ref Range Status   07/12/2019 5.8 3.5 - 6.0 % Final               Passed - Blood pressure in last 12 months     BP Readings from Last 1 Encounters:   07/12/19 106/67             Passed - LFT or AST or ALT in last 12 months     Albumin   Date Value Ref Range Status   07/12/2019 4.2 3.5 -  5.0 g/dL Final     Bilirubin, Total   Date Value Ref Range Status   07/12/2019 0.7 0.0 - 1.0 mg/dL Final     Alkaline Phosphatase   Date Value Ref Range Status   07/12/2019 50 45 - 120 U/L Final     AST   Date Value Ref Range Status   07/12/2019 18 0 - 40 U/L Final     ALT   Date Value Ref Range Status   07/12/2019 22 0 - 45 U/L Final     Protein, Total   Date Value Ref Range Status   07/12/2019 6.9 6.0 - 8.0 g/dL Final                Passed - GFR or Serum Creatinine in last 6 months     GFR MDRD Non Af Amer   Date Value Ref Range Status   07/12/2019 >60 >60 mL/min/1.73m2 Final     GFR MDRD Af Amer   Date Value Ref Range Status   07/12/2019 >60 >60 mL/min/1.73m2 Final             Passed - Microalbumin in last year      Microalbumin, Random Urine   Date Value Ref Range Status   07/12/2019 0.61 0.00 - 1.99 mg/dL Final

## 2021-06-09 NOTE — PROGRESS NOTES
"Bob Neely Jr. is a 50 y.o. male who is being evaluated via a billable video visit.      The patient has been notified of following:     \"This video visit will be conducted via a call between you and your physician/provider. We have found that certain health care needs can be provided without the need for an in-person physical exam.  This service lets us provide the care you need with a video conversation.  If a prescription is necessary we can send it directly to your pharmacy.  If lab work is needed we can place an order for that and you can then stop by our lab to have the test done at a later time.    Video visits are billed at different rates depending on your insurance coverage. Please reach out to your insurance provider with any questions.    If during the course of the call the physician/provider feels a video visit is not appropriate, you will not be charged for this service.\"    Patient has given verbal consent to a Video visit? Yes  How would you like to obtain your AVS? AVS Preference: MyChart.  Patient would like the video invitation sent by: Send to e-mail at: [email or web address]  Will anyone else be joining your video visit? No       Video Start Time: 4:16 PM    Additional provider notes: Follow-up diabetes and other issues  Patient has a history of very well controlled diabetes mellitus, he has been a bit less physically active over the past several months has had a little bit of a weight gain but in general is doing quite well.  I do not anticipate any significant change in his A1c.    Patient was at the dentist recently his blood pressure was 108/77.    He has an eye exam scheduled in the very near future.    He does need a colonoscopy I will place that referral.    He needs the Cialis renewed this is been helpful and effective.    Otherwise no other significant change in the patient's overall health status.        GENERAL: Healthy, alert and no distress  EYES: Eyes grossly normal " to inspection. No discharge or erythema, or obvious scleral/conjunctival abnormalities.  RESP: No audible wheeze, cough, or visible cyanosis.  No visible retractions or increased work of breathing.    NEURO: Cranial nerves grossly intact. Mentation and speech appropriate for age.  PSYCH: Mentation appears normal, affect normal/bright, judgement and insight intact, normal speech and appearance well-groomed      Bob was seen today for diabetes and hypertension.    Diagnoses and all orders for this visit:    Type 2 diabetes mellitus (H)  -     Glycosylated Hemoglobin A1c; Future  -     Microalbumin, Random Urine; Future  Historically has been well controlled.    Hypertension  Appears to be well controlled.    Hyperlipidemia  -     Comprehensive Metabolic Panel; Future  -     Lipid Cascade; Future  Well-controlled on simvastatin.    Male erectile disorder  -     tadalafiL (CIALIS) 20 MG tablet; TAKE ONE-HALF TO ONE TABLET BY MOUTH DAILY AS NEEDED    Screen for colon cancer  -     Ambulatory referral for Colonoscopy      PLAN:  1.  Referral for colonoscopy.  2.  Future laboratory studies as above.  3.  I renewed the Cialis.  4.  Patient will be due for a physical winter 2021.        Video-Visit Details    Type of service:  Video Visit    Video End Time (time video stopped): 4:26 PM  Originating Location (pt. Location): Home    Distant Location (provider location):  [facility]/OB     Platform used for Video Visit: Renee Dc MD

## 2021-06-10 NOTE — TELEPHONE ENCOUNTER
RN cannot approve Refill Request    RN can NOT refill this medication Protocol failed and NO refill given. Last office visit: 7/12/2019 Andres Dc MD Last Physical: Visit date not found Last MTM visit: Visit date not found Last visit same specialty: 7/12/2019 Andres Dc MD.  Next visit within 3 mo: Visit date not found  Next physical within 3 mo: Visit date not found      Kim Simmons, [facility] Triage/Med Refill 8/4/2020    Requested Prescriptions   Pending Prescriptions Disp Refills     metFORMIN (GLUCOPHAGE) 1000 MG tablet [Pharmacy Med Name: METFORMIN HCL 1,000 MG TABLET] 60 tablet 0     Sig: TAKE 1 TABLET BY MOUTH TWICE A DAY WITH FOOD       Metformin Refill Protocol Failed - 8/4/2020  7:34 AM        Failed - Blood pressure in last 12 months     BP Readings from Last 1 Encounters:   07/12/19 106/67             Failed - LFT or AST or ALT in last 12 months     Albumin   Date Value Ref Range Status   07/12/2019 4.2 3.5 - 5.0 g/dL Final     Bilirubin, Total   Date Value Ref Range Status   07/12/2019 0.7 0.0 - 1.0 mg/dL Final     Alkaline Phosphatase   Date Value Ref Range Status   07/12/2019 50 45 - 120 U/L Final     AST   Date Value Ref Range Status   07/12/2019 18 0 - 40 U/L Final     ALT   Date Value Ref Range Status   07/12/2019 22 0 - 45 U/L Final     Protein, Total   Date Value Ref Range Status   07/12/2019 6.9 6.0 - 8.0 g/dL Final                Failed - GFR or Serum Creatinine in last 6 months     GFR MDRD Non Af Amer   Date Value Ref Range Status   07/12/2019 >60 >60 mL/min/1.73m2 Final     GFR MDRD Af Amer   Date Value Ref Range Status   07/12/2019 >60 >60 mL/min/1.73m2 Final             Failed - Visit with PCP or prescribing provider visit in last 6 months or next 3 months     Last office visit with prescriber/PCP: Visit date not found OR same dept: Visit date not found OR same specialty: 7/12/2019 Andres Dc MD Last physical: Visit date not found Last MTM visit: Visit date  not found         Next appt within 3 mo: Visit date not found  Next physical within 3 mo: Visit date not found  Prescriber OR PCP: Andres Dc MD  Last diagnosis associated with med order: 1. Type 2 diabetes mellitus (H)  - metFORMIN (GLUCOPHAGE) 1000 MG tablet [Pharmacy Med Name: METFORMIN HCL 1,000 MG TABLET]; TAKE 1 TABLET BY MOUTH TWICE A DAY WITH FOOD  Dispense: 60 tablet; Refill: 0     If protocol passes may refill for 12 months if within 3 months of last provider visit (or a total of 15 months).           Failed - A1C in last 6 months     Hemoglobin A1c   Date Value Ref Range Status   07/12/2019 5.8 3.5 - 6.0 % Final               Failed - Microalbumin in last year      Microalbumin, Random Urine   Date Value Ref Range Status   07/12/2019 0.61 0.00 - 1.99 mg/dL Final

## 2021-06-11 NOTE — TELEPHONE ENCOUNTER
Refill Request  Did you contact pharmacy: Yes  Medication name:   Requested Prescriptions     Pending Prescriptions Disp Refills     lisinopriL (PRINIVIL,ZESTRIL) 20 MG tablet 90 tablet 3     Sig: Take 1 tablet (20 mg total) by mouth daily.     Who prescribed the medication: Andres Dc MD  Requested Pharmacy: [facility]  Is patient out of medication: N/A  Patient notified refills processed in 3 business days:  N/A  Okay to leave a detailed message: yes

## 2021-06-11 NOTE — TELEPHONE ENCOUNTER
RN cannot approve Refill Request    RN can NOT refill this medication PCP messaged that patient is overdue for Labs. Last office visit: 7/12/2019 Andres Dc MD Last Physical: Visit date not found Last MTM visit: Visit date not found Last visit same specialty: 7/12/2019 Andres Dc MD.  Next visit within 3 mo: Visit date not found  Next physical within 3 mo: Visit date not found      Yasmine Turcios, [facility] Triage/Med Refill 9/19/2020    Requested Prescriptions   Pending Prescriptions Disp Refills     lisinopriL (PRINIVIL,ZESTRIL) 20 MG tablet 90 tablet 3     Sig: Take 1 tablet (20 mg total) by mouth daily.       Ace Inhibitors Refill Protocol Failed - 9/18/2020  1:15 PM        Failed - Blood pressure filed in past 12 months     BP Readings from Last 1 Encounters:   07/12/19 106/67             Passed - PCP or prescribing provider visit in past 12 months       Last office visit with prescriber/PCP: 7/12/2019 nAdres Dc MD OR same dept: Visit date not found OR same specialty: 7/12/2019 Andres Dc MD  Last physical: Visit date not found Last MTM visit: Visit date not found   Next visit within 3 mo: Visit date not found  Next physical within 3 mo: Visit date not found  Prescriber OR PCP: Andres Dc MD  Last diagnosis associated with med order: 1. HTN (hypertension)  - lisinopriL (PRINIVIL,ZESTRIL) 20 MG tablet; Take 1 tablet (20 mg total) by mouth daily.  Dispense: 90 tablet; Refill: 3    If protocol passes may refill for 12 months if within 3 months of last provider visit (or a total of 15 months).             Passed - Serum Potassium in past 12 months     Lab Results   Component Value Date    Potassium 4.6 08/14/2020             Passed - Serum Creatinine in past 12 months     Creatinine   Date Value Ref Range Status   08/14/2020 1.09 0.70 - 1.30 mg/dL Final

## 2021-06-11 NOTE — TELEPHONE ENCOUNTER
Refill Approved    Rx renewed per Medication Renewal Policy. Medication was last renewed on 09/12/2019.  Last office visit was 07/13/2020 with PCP.    Ermelinda Ruiz, [facility] Triage/Med Refill 9/12/2020     Requested Prescriptions   Pending Prescriptions Disp Refills     simvastatin (ZOCOR) 40 MG tablet 90 tablet 3     Sig: Take 1 tablet (40 mg total) by mouth daily.       Statins Refill Protocol (Hmg CoA Reductase Inhibitors) Passed - 9/11/2020  5:18 PM        Passed - PCP or prescribing provider visit in past 12 months      Last office visit with prescriber/PCP: 7/12/2019 Andres Dc MD OR same dept: Visit date not found OR same specialty: Visit date not found  Last physical: Visit date not found Last MTM visit: Visit date not found   Next visit within 3 mo: Visit date not found  Next physical within 3 mo: Visit date not found  Prescriber OR PCP: Andres Dc MD  Last diagnosis associated with med order: 1. Hyperlipemia  - simvastatin (ZOCOR) 40 MG tablet; Take 1 tablet (40 mg total) by mouth daily.  Dispense: 90 tablet; Refill: 3    If protocol passes may refill for 12 months if within 3 months of last provider visit (or a total of 15 months).

## 2021-06-12 ENCOUNTER — AMBULATORY - HEALTHEAST (OUTPATIENT)
Dept: MULTI SPECIALTY CLINIC | Facility: CLINIC | Age: 51
End: 2021-06-12

## 2021-06-12 LAB — COLOGUARD-ABSTRACT: NEGATIVE

## 2021-06-12 NOTE — TELEPHONE ENCOUNTER
Refill Approved    Rx renewed per Medication Renewal Policy. Medication was last renewed on 7/13/20 vv.    Kim Simmons, [facility] Triage/Med Refill 11/2/2020     Requested Prescriptions   Pending Prescriptions Disp Refills     metFORMIN (GLUCOPHAGE) 1000 MG tablet [Pharmacy Med Name: METFORMIN HCL 1,000 MG TABLET] 180 tablet 0     Sig: TAKE 1 TABLET BY MOUTH TWICE A DAY WITH FOOD       Metformin Refill Protocol Failed - 10/30/2020 12:16 AM        Failed - Blood pressure in last 12 months     BP Readings from Last 1 Encounters:   07/12/19 106/67             Passed - LFT or AST or ALT in last 12 months     Albumin   Date Value Ref Range Status   08/14/2020 4.0 3.5 - 5.0 g/dL Final     Bilirubin, Total   Date Value Ref Range Status   08/14/2020 0.5 0.0 - 1.0 mg/dL Final     Alkaline Phosphatase   Date Value Ref Range Status   08/14/2020 53 45 - 120 U/L Final     AST   Date Value Ref Range Status   08/14/2020 16 0 - 40 U/L Final     ALT   Date Value Ref Range Status   08/14/2020 25 0 - 45 U/L Final     Protein, Total   Date Value Ref Range Status   08/14/2020 6.6 6.0 - 8.0 g/dL Final                Passed - GFR or Serum Creatinine in last 6 months     GFR MDRD Non Af Amer   Date Value Ref Range Status   08/14/2020 >60 >60 mL/min/1.73m2 Final     GFR MDRD Af Amer   Date Value Ref Range Status   08/14/2020 >60 >60 mL/min/1.73m2 Final             Passed - Visit with PCP or prescribing provider visit in last 6 months or next 3 months     Last office visit with prescriber/PCP: Visit date not found OR same dept: Visit date not found OR same specialty: 7/12/2019 Andres Dc MD Last physical: Visit date not found Last MTM visit: Visit date not found         Next appt within 3 mo: Visit date not found  Next physical within 3 mo: Visit date not found  Prescriber OR PCP: Andres Dc MD  Last diagnosis associated with med order: 1. Type 2 diabetes mellitus (H)  - metFORMIN (GLUCOPHAGE) 1000 MG tablet [Pharmacy  Med Name: METFORMIN HCL 1,000 MG TABLET]; TAKE 1 TABLET BY MOUTH TWICE A DAY WITH FOOD  Dispense: 180 tablet; Refill: 0     If protocol passes may refill for 12 months if within 3 months of last provider visit (or a total of 15 months).           Passed - A1C in last 6 months     Hemoglobin A1c   Date Value Ref Range Status   08/14/2020 7.4 (H) <=5.6 % Final     Comment:     Normal <5.7% Prediabete 5.7-6.4% Diabletes 6.5% or higher - adopted from ADA consensus guidelines               Passed - Microalbumin in last year      Microalbumin, Random Urine   Date Value Ref Range Status   08/14/2020 <0.50 0.00 - 1.99 mg/dL Final

## 2021-06-14 NOTE — PROGRESS NOTES
ASSESSMENT:  1. Screen for colon cancer  Patient is due for colorectal cancer screening.  - Cologuard    2. Hypertension  Currently well controlled.    3. Hyperlipidemia  Historically well controlled.  - Comprehensive Metabolic Panel  - Lipid Cascade    4. Type 2 diabetes mellitus (H)  Well-controlled A1c at 7.0 at actually decreased from prior value.  - Glycosylated Hemoglobin A1c        PLAN:  1.  Laboratory studies as above.  2.  Cologuard  3.  6-month follow-up.       Orders Placed This Encounter   Procedures     Comprehensive Metabolic Panel     Lipid Cascade     Order Specific Question:   Fasting is required?     Answer:   Yes     Glycosylated Hemoglobin A1c     Cologuard     There are no discontinued medications.    No follow-ups on file.    CHIEF COMPLAINT:  Chief Complaint   Patient presents with     Diabetes       SUBJECTIVE:  Bob is a 50 y.o. male patient comes in for follow-up diabetes and other issues.  Patient's A1c was 7.4 in August he thinks it is going to be about the same.  He was able to be very active in the summer but less so now in the winter.  He is also because of the longstanding pandemic has done more emotional eating.  He is going to try to get back on track so to speak.    Blood pressure is well controlled he does have an eye exam coming up.  He is not having any foot problems or difficulties.    Patient would prefer the Cologuard for colorectal cancer screening.    Otherwise no other significant change in his health status.    REVIEW OF SYSTEMS:      All other systems are negative.    PFSH:  Immunization History   Administered Date(s) Administered     DT (pediatric) 01/01/1999     Hep A, Adult IM (19yr & older) 12/14/2007, 07/21/2008     Hep A, historic 12/14/2007, 07/21/2008     INFLUENZA,SEASONAL QUAD, PF, =/> 6months 12/17/2018     Influenza U0p3-07, 03/17/2010, 03/17/2010     Influenza, Seasonal, Inj PF IIV3 09/29/2010, 12/27/2011     Influenza, inj, historic,unspecified  10/12/2005, 12/14/2007, 09/30/2015, 12/30/2016, 09/22/2020     Influenza, seasonal,quad inj 6-35 mos 10/23/2013, 12/12/2014     Influenza,seasonal, Inj IIV3 10/12/2005, 02/02/2007, 12/14/2007, 10/23/2013, 12/12/2014     Influenza,seasonal,quad inj =/> 6months 09/30/2015, 12/30/2016, 11/20/2017, 08/27/2019     Pneumo Conj 13-V (2010&after) 12/12/2014     Pneumo Polysac 23-V 08/25/2009     Td,adult,historic,unspecified 12/14/2007     Tdap 12/14/2007, 11/20/2017     Social History     Socioeconomic History     Marital status:      Spouse name: Not on file     Number of children: 3     Years of education: Not on file     Highest education level: Not on file   Occupational History     Occupation: IT Executive   Social Needs     Financial resource strain: Not on file     Food insecurity     Worry: Not on file     Inability: Not on file     Transportation needs     Medical: Not on file     Non-medical: Not on file   Tobacco Use     Smoking status: Never Smoker     Smokeless tobacco: Never Used   Substance and Sexual Activity     Alcohol use: Yes     Comment: Rare     Drug use: No     Sexual activity: Yes     Partners: Female   Lifestyle     Physical activity     Days per week: Not on file     Minutes per session: Not on file     Stress: Not on file   Relationships     Social connections     Talks on phone: Not on file     Gets together: Not on file     Attends Bahai service: Not on file     Active member of club or organization: Not on file     Attends meetings of clubs or organizations: Not on file     Relationship status: Not on file     Intimate partner violence     Fear of current or ex partner: Not on file     Emotionally abused: Not on file     Physically abused: Not on file     Forced sexual activity: Not on file   Other Topics Concern     Not on file   Social History Narrative    Diet-eating well        Exercise- Walking, some running, gym, basektball     Past Medical History:   Diagnosis Date      Anxiety     Previously on Zoloft     Family History   Problem Relation Age of Onset     Hyperlipidemia Father      Prostate cancer Father 52     Psoriasis Father         Severe-with arthitis disabled      Coronary artery disease Maternal Grandmother      Prostate cancer Maternal Grandfather      Diabetes type II Paternal Grandmother      Aneurysm Paternal Grandfather         Cardiovascular     Diabetes type II Paternal Uncle      Diabetes type II Paternal Uncle      Diabetes type II Paternal Uncle      Diabetes type II Brother      Hypertension Brother        MEDICATIONS:  Current Outpatient Medications   Medication Sig Dispense Refill     glipiZIDE (GLUCOTROL XL) 10 MG 24 hr tablet TAKE 1 TABLET BY MOUTH TWICE DAILY 180 tablet 3     lisinopriL (PRINIVIL,ZESTRIL) 20 MG tablet Take 1 tablet (20 mg total) by mouth daily. 90 tablet 3     metFORMIN (GLUCOPHAGE) 1000 MG tablet TAKE 1 TABLET BY MOUTH TWICE A DAY WITH FOOD 180 tablet 2     simvastatin (ZOCOR) 40 MG tablet Take 1 tablet (40 mg total) by mouth daily. 90 tablet 3     tadalafiL (CIALIS) 20 MG tablet TAKE ONE-HALF TO ONE TABLET BY MOUTH DAILY AS NEEDED 8 tablet 5     No current facility-administered medications for this visit.        TOBACCO USE:  Social History     Tobacco Use   Smoking Status Never Smoker   Smokeless Tobacco Never Used       VITALS:  Vitals:    01/25/21 1301   BP: 110/73   Pulse: 71   Weight: (!) 333 lb 14.4 oz (151.5 kg)     Wt Readings from Last 3 Encounters:   01/25/21 (!) 333 lb 14.4 oz (151.5 kg)   07/12/19 (!) 302 lb 6.4 oz (137.2 kg)   12/17/18 (!) 352 lb 6.4 oz (159.8 kg)       PHYSICAL EXAM:  Constitutional:   Reveals a male who appears healthy.  Vitals: per nursing notes.  Eyes:  EOMs full, PERRL.  Musculoskeletal: No peripheral swelling.  Neuro:  Alert and oriented. Cranial nerves, motor, sensory exams are intact.  No gross focal deficits.  Psychiatric:  Memory intact, mood appropriate.  Foot exam.  Feet feel warm I am able to  palpate the dorsalis pedis pulses normal monofilament, no ulceration.    QUALITY MEASURES:      DATA REVIEWED:

## 2021-06-14 NOTE — PROGRESS NOTES
"ASSESSMENT:  1. Type 2 diabetes mellitus  Historically has been well controlled, anticipate similar control.  - Glycosylated Hemoglobin A1c    2. Hypertension  Well-controlled.    3. Hyperlipidemia  Historically well controlled.  - Comprehensive Metabolic Panel  - Lipid Cascade         PLAN:  1.  Adacel vaccine, and influenza vaccines.  2.  Laboratory studies as above.  3.  Anticipate good control of his diabetes but would adjust if needed.  4.  Anticipate likely six-month follow-up.    Orders Placed This Encounter   Procedures     Tdap vaccine,  6yo or older,  IM     Influenza, Seasonal,Quad Inj, 36+ MOS     Glycosylated Hemoglobin A1c     Comprehensive Metabolic Panel     Lipid Cascade     Order Specific Question:   Fasting is required?     Answer:   No     Medications Discontinued During This Encounter   Medication Reason     simvastatin (ZOCOR) 40 MG tablet Duplicate order     metFORMIN (GLUCOPHAGE) 1000 MG tablet Duplicate order       No Follow-up on file.    CHIEF COMPLAINT:  Chief Complaint   Patient presents with     Diabetes     check, pt is fasting  NEEDS: tdap,flu vacc        SUBJECTIVE:  Bob is a 47 y.o. male presenting to the clinic today for a diabetic check.    Type II Diabetes: He was previously seen in the clinic on 12/30/16; his A1c was 6.8%. He believes that his A1c will be approximately the same today. He states that he and his wife are \"empty nesters\" now, therefore they spend more time cooking at home as opposed to going out to eat. He also continues to exercise regularly. He has been using both metformin and glipizide. He denies any problems with his feet.     Hypertension: His blood pressure is well-controlled on lisinopril.     Hyperlipidemia: His cholesterol has been well-controlled with the use of simvastatin.     Health Maintenance: He will receive the influenza vaccine today. He is also due for a tetanus shot which he agrees to receive today.     REVIEW OF SYSTEMS:   He denies any " digestive issues. All other systems are negative.    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 He enjoys playing basketball.   Immunization History   Administered Date(s) Administered     DT (pediatric) 01/01/1999     Hep A, Adult IM (19yr & older) 12/14/2007, 07/21/2008     Hep A, historic 12/14/2007, 07/21/2008     Influenza [id number], 03/17/2010, 03/17/2010     Influenza, Seasonal, Inj PF IIV3 09/29/2010, 12/27/2011     Influenza, inj, historic,unspecified 10/12/2005, 12/14/2007, 09/30/2015, 12/30/2016     Influenza, seasonal,quad inj 36+ mos 09/30/2015, 12/30/2016, 11/20/2017     Influenza, seasonal,quad inj 6-35 mos 10/23/2013, 12/12/2014     Influenza,seasonal, Inj IIV3 10/12/2005, 02/02/2007, 12/14/2007, 10/23/2013, 12/12/2014     Pneumo Conj 13-V (2010&after) 12/12/2014     Pneumo Polysac 23-V 08/25/2009     Td,adult,historic,unspecified 12/14/2007     Tdap 12/14/2007, 11/20/2017     Social History     Social History     Marital status:      Spouse name: N/A     Number of children: 3     Years of education: N/A     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      Social History Main Topics     Smoking status: Never Smoker     Smokeless tobacco: Never Used     Alcohol use Yes      Comment: Rare     Drug use: No     Sexual activity: Yes     Partners: Female     Other Topics Concern     Not on file     Social History Narrative    Diet-        Exercise- Walking, some running, gym, basektball     Past Medical History:   Diagnosis Date     Anxiety     Previously on Zoloft     Family History   Problem Relation Age of Onset     Hyperlipidemia Father      Prostate cancer Father 54     Psoriasis Father      Severe-with arthitis disabled      Coronary artery disease Maternal Grandmother      Prostate cancer Maternal Grandfather      Diabetes type II Paternal Grandmother      Aneurysm Paternal Grandfather      Cardiovascular     Diabetes type II Paternal Uncle      Diabetes type II  Paternal Uncle      Diabetes type II Paternal Uncle        MEDICATIONS:  Current Outpatient Prescriptions   Medication Sig Dispense Refill     aspirin 81 MG EC tablet Take 81 mg by mouth daily.       CALCIUM CARBONATE (CALCIUM 500 ORAL) Take by mouth.       cholecalciferol, vitamin D3, 2,000 unit Tab Take 1 tablet by mouth daily.        CIALIS 20 mg tablet TAKE ONE-HALF TO ONE TABLET BY MOUTH DAILY AS NEEDED 4 tablet 3     glipiZIDE (GLUCOTROL XL) 10 MG 24 hr tablet TAKE ONE TABLET BY MOUTH EVERY MORNING 90 tablet 0     lisinopril (PRINIVIL,ZESTRIL) 20 MG tablet Take 1 tablet (20 mg total) by mouth daily. You will need to be seen for more refills;call 24/7 90 tablet 0     metFORMIN (GLUCOPHAGE) 1000 MG tablet TAKE ONE TABLET BY MOUTH TWICE DAILY WITH MEALS 180 tablet 0     MULTIVITAMIN ORAL Take 1 tablet by mouth daily.       simvastatin (ZOCOR) 40 MG tablet TAKE ONE TABLET BY MOUTH ONE TIME DAILY 90 tablet 0     No current facility-administered medications for this visit.        TOBACCO USE:  History   Smoking Status     Never Smoker   Smokeless Tobacco     Never Used       VITALS:  Vitals:    11/20/17 0749   BP: 122/72   Pulse: 70   Weight: (!) 346 lb (156.9 kg)     Wt Readings from Last 3 Encounters:   11/20/17 (!) 346 lb (156.9 kg)   12/30/16 (!) 347 lb (157.4 kg)   03/14/16 (!) 340 lb (154.2 kg)       PHYSICAL EXAM:  Constitutional:   Reveals an alert, pleasant, talkative male.  Vitals: per nursing notes.  HEENT:  Ears:  External canals, TMs clear.    Eyes:  EOMs full, PERRL.  Lungs: Clear to A&P without rales or wheezes.  Respiratory effort normal.  Cardiac:   Regular rate and rhythm, normal S1, S2, no murmur or gallop.  Musculoskeletal: No peripheral swelling.  Neuro:  Alert and oriented. Cranial nerves, motor, sensory exams are intact.  No gross focal deficits.  Psychiatric:  Memory intact, mood appropriate.  Foot Exam: Able to palpate dorsalis pedis pulses. Feet feel warm. Normal monofilament.     QUALITY  MEASURES:  I have had an Advance Directives discussion with the patient.    DATA REVIEWED:  Additional History from Old Records Summarized (2): None.  Decision to Obtain Records (1): None.  Radiology Tests Summarized or Ordered (1): None.  Labs Reviewed or Ordered (1): Reviewed labs 12/30/16; A1c, lipid cascade. Ordered labs; A1c, lipid cascade, comprehensive metabolic panel.   Medicine Test Summarized or Ordered (1): None.  Independent Review of EKG, X-RAY, or RAPID STREP (2 each): None.     The visit lasted a total of 6 minutes face to face with the patient. Over 50% of the time was spent counseling and educating the patient about diabetes.    IDariel, am scribing for and in the presence of, Dr. Dc.    I, Dr. Dc, personally performed the services described in this documentation, as scribed by Dariel Fishman in my presence, and it is both accurate and complete.    Total data points: 1

## 2021-06-16 NOTE — TELEPHONE ENCOUNTER
Fax received from [facility] regarding patient has NOT completed their cologuard. I left voicemail for the patient to call us back, to clarify as the order we have states completed but there is no result in patient's chart.    If/when patient calls back, and has NOT completed the cologuard please ask if patient is going too OR if he would like to complete a colonoscopy instead.    If patient needs new order please route to PCP.

## 2021-06-16 NOTE — TELEPHONE ENCOUNTER
Pt called back, he will do the Cologuard test, he hasnt done it yet because with his work schedule he cant send the kit back within that certain time frame that they need him to, but he has time this week and will get it done.

## 2021-06-18 NOTE — PROGRESS NOTES
ASSESSMENT:  1. Screen for colon cancer     - Ambulatory referral for Colonoscopy    2. Type 2 diabetes mellitus (H)  Most recent A1c was 7.4, the patient probably has suboptimal exercise during the winter.  - Microalbumin, Random Urine  - Glycosylated Hemoglobin A1c    3. Hypertension  Well-controlled    4. Hyperlipidemia  Historically well controlled on simvastatin.  - Lipid Cascade  - Comprehensive Metabolic Panel      PLAN:  1.  Laboratory studies as above.  2.  The A1c is high there is room to increase the glipizide.  3.  Referral for colonoscopy.  4.  If changes are made to medications, 3 month follow-up.    Orders Placed This Encounter   Procedures     Microalbumin, Random Urine     Glycosylated Hemoglobin A1c     Lipid Cascade     Order Specific Question:   Fasting is required?     Answer:   Yes     Comprehensive Metabolic Panel     Ambulatory referral for Colonoscopy     Referral Priority:   Routine     Referral Type:   Colonoscopy     Referral Reason:   Evaluation and Treatment     Requested Specialty:   Gastroenterology     Number of Visits Requested:   1     Medications Discontinued During This Encounter   Medication Reason     metFORMIN (GLUCOPHAGE) 1000 MG tablet Duplicate order     simvastatin (ZOCOR) 40 MG tablet Duplicate order       No Follow-up on file.    CHIEF COMPLAINT:  Chief Complaint   Patient presents with     Diabetes     6 month check, pt is fasting        SUBJECTIVE:  Bob is a 48 y.o. male for type 2 diabetes mellitus.     Type 2 Diabetes Mellitus: A1c was 7.4 on 11/20/2017. He does not know what changes in his diet or activity caused A1c increase. In the winter, he plays basketball, but in the summer he increases his activity. He takes metformin 500 mg twice a day and glipizide 10 mg with no adverse side effects.  He denies numbness, tingling, or burning in his feet.     Hypertension: Blood pressure was 110/72 in clinic. He denies adverse side effects to medication.      Hyperlipidemia: Total cholesterol was 126 on 11/20/2017 and denies adverse side effects to medication.     Health Maintenance: He is interested in Cologuard screening in place of a colonoscopy.     REVIEW OF SYSTEMS:   He followed with an eye doctor yesterday and reports there were no significant findings. He denies illness over the winter. He denies pain with varicose veins.   All other systems are negative.    PFSH:    Family: He is .     Immunization History   Administered Date(s) Administered     DT (pediatric) 01/01/1999     Hep A, Adult IM (19yr & older) 12/14/2007, 07/21/2008     Hep A, historic 12/14/2007, 07/21/2008     Influenza Q7c7-50, 03/17/2010, 03/17/2010     Influenza, Seasonal, Inj PF IIV3 09/29/2010, 12/27/2011     Influenza, inj, historic,unspecified 10/12/2005, 12/14/2007, 09/30/2015, 12/30/2016     Influenza, seasonal,quad inj 36+ mos 09/30/2015, 12/30/2016, 11/20/2017     Influenza, seasonal,quad inj 6-35 mos 10/23/2013, 12/12/2014     Influenza,seasonal, Inj IIV3 10/12/2005, 02/02/2007, 12/14/2007, 10/23/2013, 12/12/2014     Pneumo Conj 13-V (2010&after) 12/12/2014     Pneumo Polysac 23-V 08/25/2009     Td,adult,historic,unspecified 12/14/2007     Tdap 12/14/2007, 11/20/2017     Social History     Social History     Marital status:      Spouse name: N/A     Number of children: 3     Years of education: N/A     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      Social History Main Topics     Smoking status: Never Smoker     Smokeless tobacco: Never Used     Alcohol use Yes      Comment: Rare     Drug use: No     Sexual activity: Yes     Partners: Female     Other Topics Concern     Not on file     Social History Narrative    Diet-        Exercise- Walking, some running, gym, basektball     Past Medical History:   Diagnosis Date     Anxiety     Previously on Zoloft     Family History   Problem Relation Age of Onset     Hyperlipidemia Father      Prostate cancer Father 54     Psoriasis  Father      Severe-with arthitis disabled      Coronary artery disease Maternal Grandmother      Prostate cancer Maternal Grandfather      Diabetes type II Paternal Grandmother      Aneurysm Paternal Grandfather      Cardiovascular     Diabetes type II Paternal Uncle      Diabetes type II Paternal Uncle      Diabetes type II Paternal Uncle        MEDICATIONS:  Current Outpatient Prescriptions   Medication Sig Dispense Refill     aspirin 81 MG EC tablet Take 81 mg by mouth daily.       CALCIUM CARBONATE (CALCIUM 500 ORAL) Take by mouth.       cholecalciferol, vitamin D3, 2,000 unit Tab Take 1 tablet by mouth daily.        CIALIS 20 mg tablet TAKE ONE-HALF TO ONE TABLET BY MOUTH DAILY AS NEEDED 4 tablet 3     glipiZIDE (GLUCOTROL XL) 10 MG 24 hr tablet TAKE 1 TABLET BY MOUTH EVERY MORNING 90 tablet 0     lisinopril (PRINIVIL,ZESTRIL) 20 MG tablet Take 1 tablet (20 mg total) by mouth daily. 90 tablet 2     metFORMIN (GLUCOPHAGE) 1000 MG tablet TAKE ONE TABLET BY MOUTH TWICE DAILY WITH MEALS 180 tablet 0     MULTIVITAMIN ORAL Take 1 tablet by mouth daily.       simvastatin (ZOCOR) 40 MG tablet TAKE ONE TABLET BY MOUTH ONE TIME DAILY 90 tablet 2     No current facility-administered medications for this visit.        TOBACCO USE:  History   Smoking Status     Never Smoker   Smokeless Tobacco     Never Used       VITALS:  Vitals:    06/15/18 1038   BP: 110/72   Pulse: 70   Weight: (!) 354 lb (160.6 kg)     Wt Readings from Last 3 Encounters:   06/15/18 (!) 354 lb (160.6 kg)   11/20/17 (!) 346 lb (156.9 kg)   12/30/16 (!) 347 lb (157.4 kg)       PHYSICAL EXAM:  Constitutional:   Reveals an obese pleasant male.  Vitals: per nursing notes.  HEENT:  Ears:  External canals, TMs clear.    Eyes:  EOMs full, PERRL.  Lungs: Clear to A&P without rales or wheezes.  Respiratory effort normal.  Cardiac:   Regular rate and rhythm, normal S1, S2, no murmur or gallop.  Musculoskeletal: No peripheral swelling.  Neuro:  Alert and oriented.  Cranial nerves, motor, sensory exams are intact.  No gross focal deficits.  Feet: Feet warm. Dorsalis pedis pulses intact. Normal monofilament test.   Psychiatric:  Memory intact, mood appropriate.    QUALITY MEASURES:  The following high BMI interventions were performed this visit: encouragement to exercise    DATA REVIEWED:  Additional History from Old Records Summarized (2): None.  Decision to Obtain Records (1): None.   Radiology Tests Summarized or Ordered (1): None.   Labs Reviewed or Ordered (1): Ordered CMP, Lip, A1c, and Mar.   Medicine Test Summarized or Ordered (1): None.   Independent Review of EKG, X-RAY, or RAPID STREP (2 each): None.     The visit lasted a total of 12 minutes face to face with the patient. Over 50% of the time was spent counseling and educating the patient about type 2 diabetes mellitus, hypertension, and hyperlipidemia.    IKaitlyn, am scribing for and in the presence of, Dr. Dc.    I, Dr. Dc, personally performed the services described in this documentation, as scribed by Kaitlyn Patrick in my presence, and it is both accurate and complete.    Total data points: 1

## 2021-06-18 NOTE — LETTER
Letter by Andres Dc MD at      Author: Andres Dc MD Service: -- Author Type: --    Filed:  Encounter Date: 12/21/2018 Status: (Other)       01/04/19     Bob SY Neely Jr.  [address]  [facility] [address]          Dear Dr. Vanda Rojas has recommended you schedule a Medication Therapy Management (MTM) appointment. MTM is designed to help you get the most of out of your medications.     During an MTM appointment, you will meet with a specially trained pharmacist to review all of your medications, both prescription and over-the-counter. They will make sure your medications are the best choice for you, safe, and working well. The MTM pharmacist works together with you and your doctor to help you understand your medications, solve any problems related to your medications, and help you meet your health goals.     To make an appointment, please call the MTM scheduling line at [phone] and toll free [phone].      We look forward to hearing from you!    Sincerely,       Mayda Dupree, PharmD, BCACP  Medication Management Pharmacist  [facility]

## 2021-06-19 NOTE — LETTER
Letter by Andres Dc MD at      Author: Andres Dc MD Service: -- Author Type: --    Filed:  Encounter Date: 7/26/2019 Status: (Other)         Bob Neely Jr.  [address]  [facility] [address]             July 26, 2019         Dear Mr. Neely,    Our records show that you are due for a colonoscopy.  We do want to inform you that there are a couple of other options besides the traditional colonoscopy that we offer.  If you would like to do the traditional colonoscopy, please contact a [facility] near you according to the card enclosed.  If you would like to do one of the other options available to you, please let you primary doctor know and they will get that ordered.  Enclosed is some information on the other options for you to read over.  If you have had any of these done at another facility, please arrange for us to receive those records so we can update your chart.    Please call with questions or contact us using [employer].    Sincerely,        Electronically signed by Andres Dc MD

## 2021-06-19 NOTE — LETTER
Letter by Andres Dc MD at      Author: Andres Dc MD Service: -- Author Type: --    Filed:  Encounter Date: 7/12/2019 Status: (Other)         Bob Neely Jr.  [address]  [facility] [address]             July 12, 2019         Dear Mr. Neely,    Below are the results from your recent visit: The A1c has improved dramatically now at 5.8, I actually want you to hold 1 of your diabetic medications, namely glipizide.  The reason is is that is a possibility your sugars could actually be getting too low which we do not want either but continue on the metformin.    Liver and kidney tests are normal.  Cholesterol remains well controlled.  No significant protein in the urine.    See us again in 6 months.    Resulted Orders   Comprehensive Metabolic Panel   Result Value Ref Range    Sodium 138 136 - 145 mmol/L    Potassium 4.5 3.5 - 5.0 mmol/L    Chloride 102 98 - 107 mmol/L    CO2 26 22 - 31 mmol/L    Anion Gap, Calculation 10 5 - 18 mmol/L    Glucose 141 (H) 70 - 125 mg/dL    BUN 16 8 - 22 mg/dL    Creatinine 1.20 0.70 - 1.30 mg/dL    GFR MDRD Af Amer >60 >60 mL/min/1.73m2    GFR MDRD Non Af Amer >60 >60 mL/min/1.73m2    Bilirubin, Total 0.7 0.0 - 1.0 mg/dL    Calcium 10.3 8.5 - 10.5 mg/dL    Protein, Total 6.9 6.0 - 8.0 g/dL    Albumin 4.2 3.5 - 5.0 g/dL    Alkaline Phosphatase 50 45 - 120 U/L    AST 18 0 - 40 U/L    ALT 22 0 - 45 U/L    Narrative    Fasting Glucose reference range is 70-99 mg/dL per  American Diabetes Association (ADA) guidelines.   Lipid Cascade   Result Value Ref Range    Cholesterol 132 <=199 mg/dL    Triglycerides 88 <=149 mg/dL    HDL Cholesterol 53 >=40 mg/dL    LDL Calculated 61 <=129 mg/dL    Patient Fasting > 8hrs? Yes    Glycosylated Hemoglobin A1c   Result Value Ref Range    Hemoglobin A1c 5.8 3.5 - 6.0 %   Microalbumin, Random Urine   Result Value Ref Range    Microalbumin, Random Urine 0.61 0.00 - 1.99 mg/dL    Creatinine, Urine 197.5 mg/dL    Microalbumin/Creatinine  Ratio Random Urine 3.1 <=19.9 mg/g    Narrative    Microalbumin, Random Urine  <2.0 mg/dL . . . . . . . . Normal  3.0-30.0 mg/dL . . . . . . Microalbuminuria  >30.0 mg/dL . . . . . .  . Clinical Proteinuria    Microalbumin/Creatinine Ratio, Random Urine  <20 mg/g . . . . .. . . . Normal   mg/g . . . . . . . Microalbuminuria  >300 mg/g . . . . . . . . Clinical Proteinuria                Please call with questions or contact us using [employer].    Sincerely,        Electronically signed by Andres Dc MD

## 2021-06-20 NOTE — LETTER
Letter by Andres Dc MD at      Author: Andres Dc MD Service: -- Author Type: --    Filed:  Encounter Date: 8/17/2020 Status: (Other)         Bob Neely Jr.  [address] [facility] [address]             August 17, 2020         Dear Mr. Neely,    Below are the results from your recent visit: The A1c is 7.4 this is higher than it has been in the past, but at this point I would not change or add to your diabetic medications rather focus on good diet and exercise which I think can help lower it.  No excess protein in the urine.  Cholesterol remains well controlled.  Liver and kidney tests are normal.    See us again in 6 months.    Resulted Orders   Comprehensive Metabolic Panel   Result Value Ref Range    Sodium 138 136 - 145 mmol/L    Potassium 4.6 3.5 - 5.0 mmol/L    Chloride 103 98 - 107 mmol/L    CO2 30 22 - 31 mmol/L    Anion Gap, Calculation 5 5 - 18 mmol/L    Glucose 169 (H) 70 - 125 mg/dL    BUN 10 8 - 22 mg/dL    Creatinine 1.09 0.70 - 1.30 mg/dL    GFR MDRD Af Amer >60 >60 mL/min/1.73m2    GFR MDRD Non Af Amer >60 >60 mL/min/1.73m2    Bilirubin, Total 0.5 0.0 - 1.0 mg/dL    Calcium 9.3 8.5 - 10.5 mg/dL    Protein, Total 6.6 6.0 - 8.0 g/dL    Albumin 4.0 3.5 - 5.0 g/dL    Alkaline Phosphatase 53 45 - 120 U/L    AST 16 0 - 40 U/L    ALT 25 0 - 45 U/L    Narrative    Fasting Glucose reference range is 70-99 mg/dL per  American Diabetes Association (ADA) guidelines.   Lipid Cascade   Result Value Ref Range    Cholesterol 137 <=199 mg/dL    Triglycerides 115 <=149 mg/dL    HDL Cholesterol 53 >=40 mg/dL    LDL Calculated 61 <=129 mg/dL    Patient Fasting > 8hrs? Yes    Glycosylated Hemoglobin A1c   Result Value Ref Range    Hemoglobin A1c 7.4 (H) <=5.6 %      Comment:      Normal <5.7% Prediabete 5.7-6.4% Diabletes 6.5% or higher - adopted from ADA consensus guidelines   Microalbumin, Random Urine   Result Value Ref Range    Microalbumin, Random Urine <0.50 0.00 - 1.99 mg/dL    Creatinine,  "Urine 76.2 mg/dL    Microalbumin/Creatinine Ratio Random Urine        Comment:      \"Unable to calculate: Creatinine and/or Microalbumin value below detectable level\"    Narrative    Microalbumin, Random Urine  <2.0 mg/dL . . . . . . . . Normal  3.0-30.0 mg/dL . . . . . . Microalbuminuria  >30.0 mg/dL . . . . . .  . Clinical Proteinuria    Microalbumin/Creatinine Ratio, Random Urine  <20 mg/g . . . . .. . . . Normal   mg/g . . . . . . . Microalbuminuria  >300 mg/g . . . . . . . . Clinical Proteinuria                Please call with questions or contact us using [employer].    Sincerely,        Electronically signed by Andres Dc MD       "

## 2021-06-21 NOTE — LETTER
Letter by Andres Dc MD at      Author: Andres Dc MD Service: -- Author Type: --    Filed:  Encounter Date: 1/26/2021 Status: (Other)         Bob Neely Jr.  [address] [place] [address]             January 26, 2021         Dear Mr. Neely,    Below are the results from your recent visit: Diabetes is actually very well controlled A1c at 7.0.  Cholesterol is very well controlled.  Liver and kidney tests are normal.    Resulted Orders   Comprehensive Metabolic Panel   Result Value Ref Range    Sodium 139 136 - 145 mmol/L    Potassium 4.3 3.5 - 5.0 mmol/L    Chloride 102 98 - 107 mmol/L    CO2 29 22 - 31 mmol/L    Anion Gap, Calculation 8 5 - 18 mmol/L    Glucose 151 (H) 70 - 125 mg/dL    BUN 10 8 - 22 mg/dL    Creatinine 1.16 0.70 - 1.30 mg/dL    GFR MDRD Af Amer >60 >60 mL/min/1.73m2    GFR MDRD Non Af Amer >60 >60 mL/min/1.73m2    Bilirubin, Total 0.9 0.0 - 1.0 mg/dL    Calcium 9.6 8.5 - 10.5 mg/dL    Protein, Total 6.8 6.0 - 8.0 g/dL    Albumin 4.2 3.5 - 5.0 g/dL    Alkaline Phosphatase 56 45 - 120 U/L    AST 16 0 - 40 U/L    ALT 19 0 - 45 U/L    Narrative    Fasting Glucose reference range is 70-99 mg/dL per  American Diabetes Association (ADA) guidelines.   Lipid Cascade   Result Value Ref Range    Cholesterol 131 <=199 mg/dL    Triglycerides 111 <=149 mg/dL    HDL Cholesterol 51 >=40 mg/dL    LDL Calculated 58 <=129 mg/dL    Patient Fasting > 8hrs? Yes    Glycosylated Hemoglobin A1c   Result Value Ref Range    Hemoglobin A1c 7.0 (H) <=5.6 %            Please call with questions or contact us using [employer].    Sincerely,        Electronically signed by Andres Dc MD

## 2021-06-22 NOTE — PROGRESS NOTES
ASSESSMENT:  1. Screening for prostate cancer  Family history of prostate cancer.  - PSA (Prostatic-Specific Antigen), Annual Screen    2. Type 2 diabetes mellitus  (H)  Suboptimal control, A1c is 8.5 now on 2 occasions.  Patient is on the maximum dose of both metformin and glipizide.  - Glycosylated Hemoglobin A1c    3. Screen for colon cancer     - Ambulatory referral for Colonoscopy    4. Hyperlipidemia  Historically has been well controlled on simvastatin.  - Comprehensive Metabolic Panel  - Lipid Cascade    5. Hypertension  Well-controlled.        PLAN:  1.  Influenza vaccine.  2.  Colonoscopy referral.  3.  Laboratory studies as above.  4.  The A1c comes back at 8.5, await all laboratory study results, however the patient is at this point a candidate for GLP-1 agonist.  5.  Anticipate 3-4-month follow-up.    Orders Placed This Encounter   Procedures     Influenza, Seasonal Quad, Preservative Free 36+ Months     PSA (Prostatic-Specific Antigen), Annual Screen     Comprehensive Metabolic Panel     Glycosylated Hemoglobin A1c     Lipid Cascade     Order Specific Question:   Fasting is required?     Answer:   Yes     Ambulatory referral for Colonoscopy     Referral Priority:   Routine     Referral Type:   Colonoscopy     Referral Reason:   Evaluation and Treatment     Requested Specialty:   Gastroenterology     Number of Visits Requested:   1     Medications Discontinued During This Encounter   Medication Reason     metFORMIN (GLUCOPHAGE) 1000 MG tablet Duplicate order       No Follow-up on file.    CHIEF COMPLAINT:  Chief Complaint   Patient presents with     Diabetes     6 month recheck        SUBJECTIVE:  Bob is a 48 y.o. male who presents for a 6 month diabetes management follow-up. Patient states that he has been more active by taking is dog on a walk several times a day and playing basketball once a week. He has been trying to eat healthier and he eats mostly meat and vegetables.      REVIEW OF SYSTEMS:    All other systems are negative.    PFSH:  Immunization History   Administered Date(s) Administered     DT (pediatric) 01/01/1999     Hep A, Adult IM (19yr & older) 12/14/2007, 07/21/2008     Hep A, historic 12/14/2007, 07/21/2008     Influenza [id number], 03/17/2010, 03/17/2010     Influenza, Seasonal, Inj PF IIV3 09/29/2010, 12/27/2011     Influenza, inj, historic,unspecified 10/12/2005, 12/14/2007, 09/30/2015, 12/30/2016     Influenza, seasonal,quad inj 36+ mos 09/30/2015, 12/30/2016, 11/20/2017     Influenza, seasonal,quad inj 6-35 mos 10/23/2013, 12/12/2014     Influenza,seasonal quad, PF, 36+MOS 12/17/2018     Influenza,seasonal, Inj IIV3 10/12/2005, 02/02/2007, 12/14/2007, 10/23/2013, 12/12/2014     Pneumo Conj 13-V (2010&after) 12/12/2014     Pneumo Polysac 23-V 08/25/2009     Td,adult,historic,unspecified 12/14/2007     Tdap 12/14/2007, 11/20/2017     Social History     Socioeconomic History     Marital status:      Spouse name: Not on file     Number of children: 3     Years of education: Not on file     Highest education level: Not on file   Social Needs     Financial resource strain: Not on file     Food insecurity - worry: Not on file     Food insecurity - inability: Not on file     Transportation needs - medical: Not on file     Transportation needs - non-medical: Not on file   Occupational History     Occupation: IT Executive   Tobacco Use     Smoking status: Never Smoker     Smokeless tobacco: Never Used   Substance and Sexual Activity     Alcohol use: Yes     Comment: Rare     Drug use: No     Sexual activity: Yes     Partners: Female   Other Topics Concern     Not on file   Social History Narrative    Diet-        Exercise- Walking, some running, gym, basektball     Past Medical History:   Diagnosis Date     Anxiety     Previously on Zoloft     Family History   Problem Relation Age of Onset     Hyperlipidemia Father      Prostate cancer Father 52     Psoriasis Father         Severe-with  arthitis disabled      Coronary artery disease Maternal Grandmother      Prostate cancer Maternal Grandfather      Diabetes type II Paternal Grandmother      Aneurysm Paternal Grandfather         Cardiovascular     Diabetes type II Paternal Uncle      Diabetes type II Paternal Uncle      Diabetes type II Paternal Uncle      Diabetes type II Brother      Hypertension Brother        MEDICATIONS:  Current Outpatient Medications   Medication Sig Dispense Refill     aspirin 81 MG EC tablet Take 81 mg by mouth daily.       CALCIUM CARBONATE (CALCIUM 500 ORAL) Take by mouth.       cholecalciferol, vitamin D3, 2,000 unit Tab Take 1 tablet by mouth daily.        glipiZIDE (GLUCOTROL XL) 10 MG 24 hr tablet 1 tablet twice daily for diabetes. 180 tablet 0     lisinopril (PRINIVIL,ZESTRIL) 20 MG tablet Take 1 tablet (20 mg total) by mouth daily. 90 tablet 2     metFORMIN (GLUCOPHAGE) 1000 MG tablet Take 1 tablet (1,000 mg total) by mouth 2 (two) times a day with meals. 180 tablet 0     MULTIVITAMIN ORAL Take 1 tablet by mouth daily.       simvastatin (ZOCOR) 40 MG tablet Take 1 tablet (40 mg total) by mouth daily. 90 tablet 2     tadalafil (CIALIS) 20 MG tablet TAKE ONE-HALF TO ONE TABLET BY MOUTH DAILY AS NEEDED 4 tablet 3     No current facility-administered medications for this visit.        TOBACCO USE:  Social History     Tobacco Use   Smoking Status Never Smoker   Smokeless Tobacco Never Used       VITALS:  Vitals:    12/17/18 0810   BP: 118/82   Pulse: 84   SpO2: 97%   Weight: (!) 352 lb 6.4 oz (159.8 kg)     Wt Readings from Last 3 Encounters:   12/17/18 (!) 352 lb 6.4 oz (159.8 kg)   06/15/18 (!) 354 lb (160.6 kg)   11/20/17 (!) 346 lb (156.9 kg)       PHYSICAL EXAM:  Constitutional:   Reveals a male who appears healthy.  Vitals: per nursing notes.  HEENT:  Ears:  External canals, TMs clear.    Eyes:  EOMs full, PERRL.  Lungs: Clear to A&P without rales or wheezes.  Respiratory effort normal.  Cardiac:   Regular rate  and rhythm, normal S1, S2, no murmur or gallop.  Musculoskeletal: No peripheral swelling. Foot: Feet feel warm, positive dorsalis pedis pulses, normal monofilament.  Neuro:  Alert and oriented. Cranial nerves, motor, sensory exams are intact.  No gross focal deficits.  Psychiatric:  Memory intact, mood appropriate.    QUALITY MEASURES:      DATA REVIEWED:  Additional History from Old Records Summarized (2): Reviewed optometrist visit from 6/14/18: No diabetic eye disease.  Decision to Obtain Records (1):   Radiology Tests Summarized or Ordered (1):   Labs Reviewed or Ordered (1): Reviewed labs from 6/15/18: Hemoglobin A1c: 8.5%. Ordered labs.  Medicine Test Summarized or Ordered (1):   Independent Review of EKG, X-RAY, or RAPID STREP (2 each):     The visit lasted a total of 11 minutes face to face with the patient. Over 50% of the time was spent counseling and educating the patient about his diabetes.    By signing my name below, I, Lon Browning, attest that this documentation has been prepared under the direction and in the presence of Dr. Andres Dc.  Electronic Signature: Marija Cottrell. 12/17/2018 8:07 AM.    I, Dr. Dc, personally performed the services described in this documentation. All medical record entries made by the scribe were at my direction and in my presence. I have reviewed the chart and discharge instructions (if applicable) and agree that the record reflects my personal performance and is accurate and complete.      Total data points: 3

## 2021-06-25 NOTE — TELEPHONE ENCOUNTER
Refill Approved    Rx renewed per Medication Renewal Policy. Medication was last renewed on 10/3/18.    Kim Simmons, [facility] Triage/Med Refill 3/19/2019     Requested Prescriptions   Pending Prescriptions Disp Refills     CIALIS 20 mg tablet [Pharmacy Med Name: CIALIS 20 MG TABLET] 4 tablet 2     Sig: TAKE ONE-HALF TO ONE TABLET BY MOUTH DAILY AS NEEDED    Medications for Impotence Refill Protocol Passed - 3/16/2019  3:24 PM       Passed - PCP or prescribing provider visit in last year    Last office visit with prescriber/PCP: 12/17/2018 Andres Dc MD OR same dept: 12/17/2018 Andres Dc MD OR same specialty: 12/17/2018 Andres Dc MD  Last physical: Visit date not found Last MTM visit: Visit date not found   Next visit within 3 mo: Visit date not found  Next physical within 3 mo: Visit date not found  Prescriber OR PCP: Andres Dc MD  Last diagnosis associated with med order: 1. Male erectile disorder  - CIALIS 20 mg tablet [Pharmacy Med Name: CIALIS 20 MG TABLET]; TAKE ONE-HALF TO ONE TABLET BY MOUTH DAILY AS NEEDED  Dispense: 4 tablet; Refill: 2    If protocol passes may refill for 12 months if within 3 months of last provider visit (or a total of 15 months).

## 2021-06-27 ENCOUNTER — HEALTH MAINTENANCE LETTER (OUTPATIENT)
Age: 51
End: 2021-06-27

## 2021-07-07 ENCOUNTER — TRANSFERRED RECORDS (OUTPATIENT)
Dept: HEALTH INFORMATION MANAGEMENT | Facility: CLINIC | Age: 51
End: 2021-07-07
Payer: COMMERCIAL

## 2021-07-07 LAB — RETINOPATHY: NEGATIVE

## 2021-08-20 ENCOUNTER — OFFICE VISIT (OUTPATIENT)
Dept: FAMILY MEDICINE | Facility: CLINIC | Age: 51
End: 2021-08-20
Payer: COMMERCIAL

## 2021-08-20 VITALS
OXYGEN SATURATION: 99 % | BODY MASS INDEX: 39.13 KG/M2 | SYSTOLIC BLOOD PRESSURE: 120 MMHG | DIASTOLIC BLOOD PRESSURE: 74 MMHG | HEART RATE: 76 BPM | WEIGHT: 315 LBS

## 2021-08-20 DIAGNOSIS — E78.49 OTHER HYPERLIPIDEMIA: Primary | ICD-10-CM

## 2021-08-20 DIAGNOSIS — N52.9 MALE ERECTILE DISORDER: ICD-10-CM

## 2021-08-20 DIAGNOSIS — E11.69 TYPE 2 DIABETES MELLITUS WITH OTHER SPECIFIED COMPLICATION, WITHOUT LONG-TERM CURRENT USE OF INSULIN (H): ICD-10-CM

## 2021-08-20 DIAGNOSIS — M25.511 CHRONIC RIGHT SHOULDER PAIN: ICD-10-CM

## 2021-08-20 DIAGNOSIS — G89.29 CHRONIC RIGHT SHOULDER PAIN: ICD-10-CM

## 2021-08-20 LAB
ALBUMIN SERPL-MCNC: 4.1 G/DL (ref 3.5–5)
ALP SERPL-CCNC: 54 U/L (ref 45–120)
ALT SERPL W P-5'-P-CCNC: 21 U/L (ref 0–45)
ANION GAP SERPL CALCULATED.3IONS-SCNC: 8 MMOL/L (ref 5–18)
AST SERPL W P-5'-P-CCNC: 17 U/L (ref 0–40)
BILIRUB SERPL-MCNC: 0.6 MG/DL (ref 0–1)
BUN SERPL-MCNC: 11 MG/DL (ref 8–22)
CALCIUM SERPL-MCNC: 9.7 MG/DL (ref 8.5–10.5)
CHLORIDE BLD-SCNC: 103 MMOL/L (ref 98–107)
CHOLEST SERPL-MCNC: 145 MG/DL
CO2 SERPL-SCNC: 27 MMOL/L (ref 22–31)
CREAT SERPL-MCNC: 1.25 MG/DL (ref 0.7–1.3)
CREAT UR-MCNC: 242 MG/DL
FASTING STATUS PATIENT QL REPORTED: YES
GFR SERPL CREATININE-BSD FRML MDRD: 65 ML/MIN/1.73M2
GLUCOSE BLD-MCNC: 220 MG/DL (ref 70–125)
HBA1C MFR BLD: 7.9 % (ref 0–5.6)
HDLC SERPL-MCNC: 50 MG/DL
LDLC SERPL CALC-MCNC: 68 MG/DL
MICROALBUMIN UR-MCNC: 1.68 MG/DL (ref 0–1.99)
MICROALBUMIN/CREAT UR: 6.9 MG/G CR
POTASSIUM BLD-SCNC: 4.6 MMOL/L (ref 3.5–5)
PROT SERPL-MCNC: 6.8 G/DL (ref 6–8)
SODIUM SERPL-SCNC: 138 MMOL/L (ref 136–145)
TRIGL SERPL-MCNC: 134 MG/DL

## 2021-08-20 PROCEDURE — 36415 COLL VENOUS BLD VENIPUNCTURE: CPT | Performed by: FAMILY MEDICINE

## 2021-08-20 PROCEDURE — 83036 HEMOGLOBIN GLYCOSYLATED A1C: CPT | Performed by: FAMILY MEDICINE

## 2021-08-20 PROCEDURE — 80053 COMPREHEN METABOLIC PANEL: CPT | Performed by: FAMILY MEDICINE

## 2021-08-20 PROCEDURE — 82043 UR ALBUMIN QUANTITATIVE: CPT | Performed by: FAMILY MEDICINE

## 2021-08-20 PROCEDURE — 80061 LIPID PANEL: CPT | Performed by: FAMILY MEDICINE

## 2021-08-20 PROCEDURE — 99214 OFFICE O/P EST MOD 30 MIN: CPT | Performed by: FAMILY MEDICINE

## 2021-08-20 RX ORDER — SIMVASTATIN 40 MG
40 TABLET ORAL DAILY
Qty: 90 TABLET | Refills: 3 | Status: SHIPPED | OUTPATIENT
Start: 2021-08-20 | End: 2022-09-13

## 2021-08-20 RX ORDER — LISINOPRIL 20 MG/1
20 TABLET ORAL DAILY
Qty: 90 TABLET | Refills: 3 | Status: SHIPPED | OUTPATIENT
Start: 2021-08-20 | End: 2022-08-13

## 2021-08-20 RX ORDER — TADALAFIL 20 MG/1
TABLET ORAL
Qty: 8 TABLET | Refills: 5 | Status: SHIPPED | OUTPATIENT
Start: 2021-08-20 | End: 2023-01-28

## 2021-08-20 RX ORDER — GLIPIZIDE 10 MG/1
TABLET, FILM COATED, EXTENDED RELEASE ORAL
Qty: 180 TABLET | Refills: 3 | Status: SHIPPED | OUTPATIENT
Start: 2021-08-20 | End: 2022-05-11

## 2021-08-20 NOTE — LETTER
August 23, 2021      Bob Neely .  [address]  [facility] [address]        Dear ,    We are writing to inform you of your test results.  Diabetes is not as well-controlled A1c at 7.9, however I am not changing any of your medications for now, rather I want you to continue to focus on good diet and exercise, I do think he can get the A1c down to about 7 again.  Liver and kidney tests are normal.  Overall the cholesterol is well controlled.  No excess protein in the urine.    See us again at the next 3 to 6 months.         Resulted Orders   Comprehensive metabolic panel   Result Value Ref Range    Sodium 138 136 - 145 mmol/L    Potassium 4.6 3.5 - 5.0 mmol/L    Chloride 103 98 - 107 mmol/L    Carbon Dioxide (CO2) 27 22 - 31 mmol/L    Anion Gap 8 5 - 18 mmol/L    Urea Nitrogen 11 8 - 22 mg/dL    Creatinine 1.25 0.70 - 1.30 mg/dL    Calcium 9.7 8.5 - 10.5 mg/dL    Glucose 220 (H) 70 - 125 mg/dL    Alkaline Phosphatase 54 45 - 120 U/L    AST 17 0 - 40 U/L    ALT 21 0 - 45 U/L    Protein Total 6.8 6.0 - 8.0 g/dL    Albumin 4.1 3.5 - 5.0 g/dL    Bilirubin Total 0.6 0.0 - 1.0 mg/dL    GFR Estimate 65 >60 mL/min/1.73m2      Comment:      As of July 11, 2021, eGFR is calculated by the CKD-EPI creatinine equation, without race adjustment. eGFR can be influenced by muscle mass, exercise, and diet. The reported eGFR is an estimation only and is only applicable if the renal function is stable.   Lipid panel reflex to direct LDL Fasting   Result Value Ref Range    Cholesterol 145 <=199 mg/dL    Triglycerides 134 <=149 mg/dL    Direct Measure HDL 50 >=40 mg/dL      Comment:      HDL Cholesterol Reference Range:     0-2 years:   No reference ranges established for patients under 2 years old  at [facility] for lipid analytes.    2-8 years:  Greater than 45 mg/dL     18 years and older:   Female: Greater than or equal to 50 mg/dL   Male:   Greater than or equal to 40 mg/dL    LDL Cholesterol  Calculated 68 <=129 mg/dL    Patient Fasting > 8hrs? Yes    HEMOGLOBIN A1C   Result Value Ref Range    Hemoglobin A1C 7.9 (H) 0.0 - 5.6 %      Comment:      Normal <5.7%   Prediabetes 5.7-6.4%    Diabetes 6.5% or higher     Note: Adopted from ADA consensus guidelines.   Albumin Random Urine Quantitative with Creat Ratio   Result Value Ref Range    Microalbumin Urine mg/dL 1.68 0.00 - 1.99 mg/dL    Creatinine Urine mg/dL 242 mg/dL    Microalbumin Urine mg/g Cr 6.9 <=19.9 mg/g Cr    Narrative    Microalbumin, Random Urine   <2.0 mg/dL . . . . . . . . Normal   3.0-30.0 mg/dL . . . . . . Microalbuminuria   >30.0 mg/dL . . . . . .  . Clinical Proteinuria     Microalbumin/Creatinine Ratio, Random Urine   <20 mg/g . . . . .. . . . Normal    mg/g . . . . . . . Microalbuminuria   >300 mg/g . . . . . . . . Clinical Proteinuria       If you have any questions or concerns, please call the clinic at the number listed above.       Sincerely,      Andres Dc MD

## 2021-08-20 NOTE — PROGRESS NOTES
ASSESSMENT:    1. Male erectile disorder  Cialis has been helpful.  - tadalafil (CIALIS) 20 MG tablet; [TADALAFIL (CIALIS) 20 MG TABLET] TAKE ONE-HALF TO ONE TABLET BY MOUTH DAILY AS NEEDED  Dispense: 8 tablet; Refill: 5    2. Hyperlipidemia  Generally controlled on simvastatin.  - simvastatin (ZOCOR) 40 MG tablet; Take 1 tablet (40 mg) by mouth daily  Dispense: 90 tablet; Refill: 3  - Comprehensive metabolic panel  - Lipid panel reflex to direct LDL Fasting    3. Hypertension  Currently well controlled.  - lisinopril (ZESTRIL) 20 MG tablet; Take 1 tablet (20 mg) by mouth daily  Dispense: 90 tablet; Refill: 3    4. Chronic right shoulder pain  Several months of right shoulder pain, suspect rotator cuff strain but not a full-thickness tear.    5. Type 2 diabetes mellitus (H)  Suboptimal control with A1c at 7.9  - glipiZIDE (GLUCOTROL XL) 10 MG 24 hr tablet; [GLIPIZIDE (GLUCOTROL XL) 10 MG 24 HR TABLET] TAKE 1 TABLET BY MOUTH TWICE DAILY  Dispense: 180 tablet; Refill: 3  - metFORMIN (GLUCOPHAGE) 1000 MG tablet; [METFORMIN (GLUCOPHAGE) 1000 MG TABLET] TAKE 1 TABLET BY MOUTH TWICE A DAY WITH FOOD  Dispense: 180 tablet; Refill: 3  - HEMOGLOBIN A1C; Future  - Albumin Random Urine Quantitative with Creat Ratio; Future  - HEMOGLOBIN A1C  - Albumin Random Urine Quantitative with Creat Ratio      PLAN:  1.  Laboratory studies as above.  2.  I renewed the patient's medications as above without changes  3.  For the diabetes, I want the patient to focus on improved diet and exercise, rather than adding or changing medications now.  4.  3 to 6-month follow-up.    Orders Placed This Encounter   Procedures     HEMOGLOBIN A1C     Albumin Random Urine Quantitative with Creat Ratio     Comprehensive metabolic panel     Lipid panel reflex to direct LDL Fasting     Medications Discontinued During This Encounter   Medication Reason     tadalafiL (CIALIS) 20 MG tablet Reorder     glipiZIDE (GLUCOTROL XL) 10 MG 24 hr tablet Reorder      simvastatin (ZOCOR) 40 MG tablet Reorder     lisinopriL (PRINIVIL,ZESTRIL) 20 MG tablet Reorder     metFORMIN (GLUCOPHAGE) 1000 MG tablet Reorder       No follow-ups on file.    CHIEF COMPLAINT:  chief complaint    SUBJECTIVE:  Bob is a 51 year old male patient comes in with several concerns.    Patient has underlying diabetes mellitus type 2, his most recent A1c was 7.0 he thinks it would be fairly similar to that, he does admit the pandemic has been difficult on his health, he has gained some weight he has been less active though in the past month he has been swimming regularly which should help.    He has a history of underlying hypertension very well controlled.    Historically his cholesterol has also been well controlled.    For about 3 months the patient has been having some right shoulder pain, typically he notices this with motion and activity above his head, this was not preceded by any injury or change in his usual level of activity, of note it actually has improved once he has started swimming, after examination I told the patient I think there is a strain but not a tear of the rotator cuff and this generally can be managed conservatively.    Otherwise the patient is up-to-date on preventive maintenance issues.    REVIEW OF SYSTEMS:      All other systems are negative.    PFSH:  Immunization History   Administered Date(s) Administered     COVID-19,PF,Moderna 03/16/2021, 04/13/2021     DT (PEDS <7y) 01/01/1999     FLU 6-35 months 09/29/2010, 12/27/2011     Flu, Unspecified 10/12/2005, 12/14/2007, 09/30/2015, 12/30/2016, 09/22/2020     HepA, Unspecified 12/14/2007, 07/21/2008     HepA-Adult 12/14/2007, 07/21/2008     Influenza (H1N1) 03/17/2010, 03/17/2010     Influenza (IIV3) PF 10/12/2005, 02/02/2007, 12/14/2007, 10/23/2013, 12/12/2014     Influenza Vaccine IM > 6 months Valent IIV4 12/17/2018     Influenza Vaccine, 6+MO IM (QUADRIVALENT W/PRESERVATIVES) 10/23/2013, 12/12/2014, 09/30/2015, 12/30/2016,  11/20/2017, 08/27/2019     Pneumo Conj 13-V (2010&after) 12/12/2014     Pneumococcal 23 valent 08/25/2009     Td,adult,historic,unspecified 12/14/2007     Tdap (Adacel,Boostrix) 12/14/2007, 11/20/2017     Social History     Socioeconomic History     Marital status:      Spouse name: Not on file     Number of children: 3     Years of education: Not on file     Highest education level: Not on file   Occupational History     Occupation: IT, 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   Tobacco Use     Smoking status: Never Smoker     Smokeless tobacco: Never Used   Substance and Sexual Activity     Alcohol use: Yes     Comment: Alcoholic Drinks/day: Rare     Drug use: No     Sexual activity: Yes     Partners: Female   Other Topics Concern     Not on file   Social History Narrative    Diet-eating well            Exercise- Walking, some running, gym, basketball, swimming     Social Determinants of Health     Financial Resource Strain:      Difficulty of Paying Living Expenses:    Food Insecurity:      Worried About Running Out of Food in the Last Year:      Ran Out of Food in the Last Year:    Transportation Needs:      Lack of Transportation (Medical):      Lack of Transportation (Non-Medical):    Physical Activity:      Days of Exercise per Week:      Minutes of Exercise per Session:    Stress:      Feeling of Stress :    Social Connections:      Frequency of Communication with Friends and Family:      Frequency of Social Gatherings with Friends and Family:      Attends [employer] Services:      Active Member of Clubs or Organizations:      Attends Club or Organization Meetings:      Marital Status:    Intimate Partner Violence:      Fear of Current or Ex-Partner:      Emotionally Abused:      Physically Abused:      Sexually Abused:      Past Medical History:   Diagnosis Date     Anxiety     Previously on Zoloft     Family History   Problem Relation Age of Onset     Hyperlipidemia Father      Prostate Cancer Father  52.00     Psoriasis Father         Severe-with arthitis disabled      Coronary Artery Disease Maternal Grandmother      Prostate Cancer Maternal Grandfather      Diabetes Type 2  Paternal Grandmother      Aneurysm Paternal Grandfather         Cardiovascular     Diabetes Type 2  Paternal Uncle      Diabetes Type 2  Paternal Uncle      Diabetes Type 2  Paternal Uncle      Diabetes Type 2  Brother      Hypertension Brother        MEDICATIONS:  Current Outpatient Medications   Medication Sig Dispense Refill     glipiZIDE (GLUCOTROL XL) 10 MG 24 hr tablet [GLIPIZIDE (GLUCOTROL XL) 10 MG 24 HR TABLET] TAKE 1 TABLET BY MOUTH TWICE DAILY 180 tablet 3     lisinopril (ZESTRIL) 20 MG tablet Take 1 tablet (20 mg) by mouth daily 90 tablet 3     metFORMIN (GLUCOPHAGE) 1000 MG tablet [METFORMIN (GLUCOPHAGE) 1000 MG TABLET] TAKE 1 TABLET BY MOUTH TWICE A DAY WITH FOOD 180 tablet 3     simvastatin (ZOCOR) 40 MG tablet Take 1 tablet (40 mg) by mouth daily 90 tablet 3     tadalafil (CIALIS) 20 MG tablet [TADALAFIL (CIALIS) 20 MG TABLET] TAKE ONE-HALF TO ONE TABLET BY MOUTH DAILY AS NEEDED 8 tablet 5       TOBACCO USE:  History   Smoking Status     Never Smoker   Smokeless Tobacco     Never Used       VITALS:  Vitals:    08/20/21 0923   BP: 120/74   Pulse: 76   SpO2: 99%   Weight: (!) 153.6 kg (338 lb 9.6 oz)     Wt Readings from Last 3 Encounters:   08/20/21 (!) 153.6 kg (338 lb 9.6 oz)   01/25/21 (!) 151.5 kg (333 lb 14.4 oz)   07/12/19 137.2 kg (302 lb 6.4 oz)       PHYSICAL EXAM:  Constitutional:   Reveals a male who appears his stated age.  Vitals: per nursing notes.  Eyes:  EOMs full, PERRL.  Musculoskeletal: No peripheral swelling.  I do not reproduce any pain or tenderness of the right shoulder, the rotator cuff muscles are intact bilaterally of note the patient's range of motion is a little bit less fluid on the right versus the left and he has some difficulty getting the right shoulder above his head.    Neuro:  Alert and  oriented. Cranial nerves, motor, sensory exams are intact.  No gross focal deficits.  Psychiatric:  Memory intact, mood appropriate.    QUALITY MEASURES:      DATA REVIEWED:

## 2022-04-03 ENCOUNTER — HEALTH MAINTENANCE LETTER (OUTPATIENT)
Age: 52
End: 2022-04-03

## 2022-04-22 ENCOUNTER — OFFICE VISIT (OUTPATIENT)
Dept: FAMILY MEDICINE | Facility: CLINIC | Age: 52
End: 2022-04-22
Payer: COMMERCIAL

## 2022-04-22 VITALS
DIASTOLIC BLOOD PRESSURE: 71 MMHG | WEIGHT: 315 LBS | HEART RATE: 76 BPM | BODY MASS INDEX: 38.66 KG/M2 | SYSTOLIC BLOOD PRESSURE: 112 MMHG

## 2022-04-22 DIAGNOSIS — Z11.4 SCREENING FOR HIV (HUMAN IMMUNODEFICIENCY VIRUS): ICD-10-CM

## 2022-04-22 DIAGNOSIS — E11.69 TYPE 2 DIABETES MELLITUS WITH OTHER SPECIFIED COMPLICATION, WITHOUT LONG-TERM CURRENT USE OF INSULIN (H): Primary | ICD-10-CM

## 2022-04-22 DIAGNOSIS — Z11.59 NEED FOR HEPATITIS C SCREENING TEST: ICD-10-CM

## 2022-04-22 DIAGNOSIS — E78.49 OTHER HYPERLIPIDEMIA: ICD-10-CM

## 2022-04-22 DIAGNOSIS — I10 ESSENTIAL HYPERTENSION: ICD-10-CM

## 2022-04-22 LAB
ALBUMIN SERPL-MCNC: 4 G/DL (ref 3.5–5)
ALP SERPL-CCNC: 52 U/L (ref 45–120)
ALT SERPL W P-5'-P-CCNC: 28 U/L (ref 0–45)
ANION GAP SERPL CALCULATED.3IONS-SCNC: 8 MMOL/L (ref 5–18)
AST SERPL W P-5'-P-CCNC: 21 U/L (ref 0–40)
BILIRUB SERPL-MCNC: 0.8 MG/DL (ref 0–1)
BUN SERPL-MCNC: 11 MG/DL (ref 8–22)
CALCIUM SERPL-MCNC: 9.4 MG/DL (ref 8.5–10.5)
CHLORIDE BLD-SCNC: 103 MMOL/L (ref 98–107)
CHOLEST SERPL-MCNC: 112 MG/DL
CO2 SERPL-SCNC: 27 MMOL/L (ref 22–31)
CREAT SERPL-MCNC: 1.24 MG/DL (ref 0.7–1.3)
FASTING STATUS PATIENT QL REPORTED: YES
GFR SERPL CREATININE-BSD FRML MDRD: 69 ML/MIN/1.73M2
GLUCOSE BLD-MCNC: 230 MG/DL (ref 70–125)
HBA1C MFR BLD: 8.6 % (ref 0–5.6)
HDLC SERPL-MCNC: 36 MG/DL
LDLC SERPL CALC-MCNC: 47 MG/DL
POTASSIUM BLD-SCNC: 4.6 MMOL/L (ref 3.5–5)
PROT SERPL-MCNC: 6.9 G/DL (ref 6–8)
SODIUM SERPL-SCNC: 138 MMOL/L (ref 136–145)
TRIGL SERPL-MCNC: 144 MG/DL

## 2022-04-22 PROCEDURE — 80053 COMPREHEN METABOLIC PANEL: CPT | Performed by: FAMILY MEDICINE

## 2022-04-22 PROCEDURE — 99214 OFFICE O/P EST MOD 30 MIN: CPT | Performed by: FAMILY MEDICINE

## 2022-04-22 PROCEDURE — 80061 LIPID PANEL: CPT | Performed by: FAMILY MEDICINE

## 2022-04-22 PROCEDURE — 36415 COLL VENOUS BLD VENIPUNCTURE: CPT | Performed by: FAMILY MEDICINE

## 2022-04-22 PROCEDURE — 83036 HEMOGLOBIN GLYCOSYLATED A1C: CPT | Performed by: FAMILY MEDICINE

## 2022-04-22 NOTE — LETTER
April 22, 2022      Biju Neely Jr.  [address]  [place] [address]        Dear ,    We are writing to inform you of your test results.  As discussed, the A1c unfortunately got worse now at 8.6, you are on the maximum dose of both metformin and glipizide.  We will have you meet with our pharmacist to discuss a new third diabetic medication.,  We oftentimes use medications in the class of GLP-1's  Cholesterol is overall well controlled, slightly low HDL but that is largely genetically determined, it has generally been a bit higher in the past.  Liver and kidney tests are normal.    I would want to see you back again in 6 months      Resulted Orders   Comprehensive metabolic panel   Result Value Ref Range    Sodium 138 136 - 145 mmol/L    Potassium 4.6 3.5 - 5.0 mmol/L    Chloride 103 98 - 107 mmol/L    Carbon Dioxide (CO2) 27 22 - 31 mmol/L    Anion Gap 8 5 - 18 mmol/L    Urea Nitrogen 11 8 - 22 mg/dL    Creatinine 1.24 0.70 - 1.30 mg/dL    Calcium 9.4 8.5 - 10.5 mg/dL    Glucose 230 (H) 70 - 125 mg/dL    Alkaline Phosphatase 52 45 - 120 U/L    AST 21 0 - 40 U/L    ALT 28 0 - 45 U/L    Protein Total 6.9 6.0 - 8.0 g/dL    Albumin 4.0 3.5 - 5.0 g/dL    Bilirubin Total 0.8 0.0 - 1.0 mg/dL    GFR Estimate 69 >60 mL/min/1.73m2      Comment:      Effective December 21, 2021 eGFRcr in adults is calculated using the 2021 CKD-EPI creatinine equation which includes age and gender (Dinesh et al., NEJM, DOI: 10.1056/YFYAmx1070644)   Lipid panel reflex to direct LDL Fasting   Result Value Ref Range    Cholesterol 112 <=199 mg/dL    Triglycerides 144 <=149 mg/dL    Direct Measure HDL 36 (L) >=40 mg/dL      Comment:      HDL Cholesterol Reference Range:     0-2 years:   No reference ranges established for patients under 2 years old  at [facility] for lipid analytes.    2-8 years:  Greater than 45 mg/dL     18 years and older:   Female: Greater than or equal to 50 mg/dL   Male:   Greater than or equal  to 40 mg/dL    LDL Cholesterol Calculated 47 <=129 mg/dL    Patient Fasting > 8hrs? Yes    Hemoglobin A1c   Result Value Ref Range    Hemoglobin A1C 8.6 (H) 0.0 - 5.6 %      Comment:      Normal <5.7%   Prediabetes 5.7-6.4%    Diabetes 6.5% or higher     Note: Adopted from ADA consensus guidelines.       If you have any questions or concerns, please call the clinic at the number listed above.       Sincerely,      Andres Dc MD

## 2022-04-22 NOTE — PROGRESS NOTES
ASSESSMENT:  (E11.69) Type 2 diabetes mellitus (H)  (primary encounter diagnosis)  Comment: Suboptimal control with A1c at 8.6  Plan: Hemoglobin A1c, Med Therapy Management Referral             (I10) Hypertension  Comment: Well-controlled  Plan:      (E78.49) Hyperlipidemia  Comment: Historically well controlled  Plan: Comprehensive metabolic panel, Lipid panel         reflex to direct LDL Fasting                 PLAN:  1.  Laboratory studies as above  2.  Referral to our pharmacist, for consideration of a GLP-1 agonist  3.  No more than 6-month follow-up but presume the patient will of also pharmacy follow-up as well       Orders Placed This Encounter   Procedures     Comprehensive metabolic panel     Lipid panel reflex to direct LDL Fasting     Hemoglobin A1c     Med Therapy Management Referral     There are no discontinued medications.    Return in about 6 months (around 10/22/2022) for Routine preventive, with me, in person.    CHIEF COMPLAINT:  chief complaint    SUBJECTIVE:  Bob is a 52 year old male who comes in for follow-up of his type 2 diabetes.  Back in August his A1c was elevated at 7.9, patient reports that since then he has made an improvement in his diet he is eating much better.    He believes his A1c is going to come down correspondingly.  Additionally he and his wife are in the process of moving they are building a house he is therefore doing a lot of packing and is quite physically active with this.    His blood pressure is well controlled    Historically his cholesterol has also been well controlled.    He did have an eye exam 6 months ago which was completely normal however we do not have a copy of that.    REVIEW OF SYSTEMS:      All other systems are negative.    PFSH:  Immunization History   Administered Date(s) Administered     COVID-19,PF,Moderna 03/16/2021, 04/13/2021, 10/25/2021     DT (PEDS <7y) 01/01/1999     FLU 6-35 months 09/29/2010, 12/27/2011     Flu, Unspecified 10/12/2005,  12/14/2007, 09/30/2015, 12/30/2016, 09/22/2020     HepA, Unspecified 12/14/2007, 07/21/2008     HepA-Adult 12/14/2007, 07/21/2008     Influenza (H1N1) 03/17/2010, 03/17/2010     Influenza (IIV3) PF 10/12/2005, 02/02/2007, 12/14/2007, 10/23/2013, 12/12/2014     Influenza Vaccine IM > 6 months Valent IIV4 (Alfuria,Fluzone) 12/17/2018, 09/12/2021     Influenza Vaccine, 6+MO IM (QUADRIVALENT W/PRESERVATIVES) 10/23/2013, 12/12/2014, 09/30/2015, 12/30/2016, 11/20/2017, 08/27/2019, 09/12/2021     Pneumo Conj 13-V (2010&after) 12/12/2014     Pneumococcal 23 valent 08/25/2009     Td,adult,historic,unspecified 12/14/2007     Tdap (Adacel,Boostrix) 12/14/2007, 11/20/2017     Social History     Socioeconomic History     Marital status:      Spouse name: Not on file     Number of children: 3     Years of education: Not on file     Highest education level: Not on file   Occupational History     Occupation: IT, [employer]     Comment: work from home   Tobacco Use     Smoking status: Never Smoker     Smokeless tobacco: Never Used   Substance and Sexual Activity     Alcohol use: Yes     Comment: Alcoholic Drinks/day: Rare     Drug use: No     Sexual activity: Yes     Partners: Female   Other Topics Concern     Not on file   Social History Narrative    Diet-eating well            Exercise- Walking, some running, gym, basketball, swimming     Social Determinants of Health     Financial Resource Strain: Not on file   Food Insecurity: Not on file   Transportation Needs: Not on file   Physical Activity: Not on file   Stress: Not on file   Social Connections: Not on file   Intimate Partner Violence: Not on file   Housing Stability: Not on file     Past Medical History:   Diagnosis Date     Anxiety     Previously on Zoloft     Family History   Problem Relation Age of Onset     Hyperlipidemia Father      Prostate Cancer Father 52.00     Psoriasis Father         Severe-with arthitis disabled      Coronary Artery Disease Maternal  Grandmother      Prostate Cancer Maternal Grandfather      Diabetes Type 2  Paternal Grandmother      Aneurysm Paternal Grandfather         Cardiovascular     Diabetes Type 2  Paternal Uncle      Diabetes Type 2  Paternal Uncle      Diabetes Type 2  Paternal Uncle      Diabetes Type 2  Brother      Hypertension Brother        MEDICATIONS:  Current Outpatient Medications   Medication Sig Dispense Refill     glipiZIDE (GLUCOTROL XL) 10 MG 24 hr tablet [GLIPIZIDE (GLUCOTROL XL) 10 MG 24 HR TABLET] TAKE 1 TABLET BY MOUTH TWICE DAILY 180 tablet 3     lisinopril (ZESTRIL) 20 MG tablet Take 1 tablet (20 mg) by mouth daily 90 tablet 3     metFORMIN (GLUCOPHAGE) 1000 MG tablet [METFORMIN (GLUCOPHAGE) 1000 MG TABLET] TAKE 1 TABLET BY MOUTH TWICE A DAY WITH FOOD 180 tablet 3     simvastatin (ZOCOR) 40 MG tablet Take 1 tablet (40 mg) by mouth daily 90 tablet 3     tadalafil (CIALIS) 20 MG tablet [TADALAFIL (CIALIS) 20 MG TABLET] TAKE ONE-HALF TO ONE TABLET BY MOUTH DAILY AS NEEDED 8 tablet 5       TOBACCO USE:  History   Smoking Status     Never Smoker   Smokeless Tobacco     Never Used       VITALS:  Vitals:    04/22/22 0901   BP: 112/71   Pulse: 76   Weight: (!) 151.7 kg (334 lb 8 oz)     Wt Readings from Last 3 Encounters:   04/22/22 (!) 151.7 kg (334 lb 8 oz)   08/20/21 (!) 153.6 kg (338 lb 9.6 oz)   01/25/21 (!) 151.5 kg (333 lb 14.4 oz)       PHYSICAL EXAM:  Constitutional:   Reveals a male who appears his stated age.  Vitals: per nursing notes.  Eyes:  EOMs full, PERRL.  Musculoskeletal: No peripheral swelling.  Neuro:  Alert and oriented. Cranial nerves, motor, sensory exams are intact.  No gross focal deficits.  Psychiatric:  Memory intact, mood appropriate.    QUALITY MEASURES:      DATA REVIEWED:    Answers for HPI/ROS submitted by the patient on 4/22/2022  Frequency of checking blood sugars:: not at all  Diabetic concerns:: none  Paraesthesia present:: none of these symptoms  Have you had a diabetic eye exam within  the last year?: Yes  How many servings of fruits and vegetables do you eat daily?: 4 or more  On average, how many sweetened beverages do you drink each day (Examples: soda, juice, sweet tea, etc.  Do NOT count diet or artificially sweetened beverages)?: 1  How many minutes a day do you exercise enough to make your heart beat faster?: 10 to 19  How many days a week do you exercise enough to make your heart beat faster?: 3 or less  How many days per week do you miss taking your medication?: 0

## 2022-05-11 ENCOUNTER — OFFICE VISIT (OUTPATIENT)
Dept: PHARMACY | Facility: CLINIC | Age: 52
End: 2022-05-11
Payer: COMMERCIAL

## 2022-05-11 DIAGNOSIS — I10 ESSENTIAL HYPERTENSION: ICD-10-CM

## 2022-05-11 DIAGNOSIS — E78.49 OTHER HYPERLIPIDEMIA: ICD-10-CM

## 2022-05-11 DIAGNOSIS — E11.69 TYPE 2 DIABETES MELLITUS WITH OTHER SPECIFIED COMPLICATION, WITHOUT LONG-TERM CURRENT USE OF INSULIN (H): Primary | ICD-10-CM

## 2022-05-11 PROCEDURE — 99605 MTMS BY PHARM NP 15 MIN: CPT | Performed by: PHARMACIST

## 2022-05-11 PROCEDURE — 99607 MTMS BY PHARM ADDL 15 MIN: CPT | Performed by: PHARMACIST

## 2022-05-11 RX ORDER — SEMAGLUTIDE 1.34 MG/ML
INJECTION, SOLUTION SUBCUTANEOUS
Qty: 1.5 ML | Refills: 1 | Status: SHIPPED | OUTPATIENT
Start: 2022-05-11 | End: 2022-07-13

## 2022-05-11 RX ORDER — GLIPIZIDE 10 MG/1
10 TABLET, FILM COATED, EXTENDED RELEASE ORAL DAILY
Qty: 90 TABLET | Refills: 3
Start: 2022-05-11 | End: 2022-07-13

## 2022-05-11 RX ORDER — BLOOD SUGAR DIAGNOSTIC
STRIP MISCELLANEOUS
Qty: 200 STRIP | Refills: 3 | Status: SHIPPED | OUTPATIENT
Start: 2022-05-11

## 2022-05-11 RX ORDER — BLOOD-GLUCOSE METER
1 EACH MISCELLANEOUS DAILY
Qty: 1 KIT | Refills: 0 | Status: SHIPPED | OUTPATIENT
Start: 2022-05-11 | End: 2022-06-15

## 2022-05-11 RX ORDER — LANCETS
EACH MISCELLANEOUS
Qty: 200 EACH | Refills: 3 | Status: SHIPPED | OUTPATIENT
Start: 2022-05-11

## 2022-05-11 NOTE — PATIENT INSTRUCTIONS
"Recommendations from today's Medication Management (MTM) visit:                                                      Start Ozempic. Inject 0.25 mg once a week for 4 weeks, then increase to 0.5 mg injected once a week.     Decrease glipizide XL to 1 tablet (10 mg) daily.     Check blood sugars twice daily. New glucometer and testing supplies sent to pharmacy.    Blood Sugar Goals  Before meals:   2 hours after meal: less than 180     Change simvastatin 40 mg every evening.       To schedule another MTM appointment, please call the MTM scheduling line at [phone] or toll-free at [phone].     My MTM pharmacist's contact information:      Please feel free to contact me with any questions or concerns you have via [employer] or calling the clinic.       Mayda Dupree, PharmD, [facility]  Medication Management (MTM) Pharmacist  [facility]     It was great speaking with you today.  I value your experience and would be very thankful for your time in providing feedback in our clinic survey. In the next few days, you may receive an email or text message from [employer] with a link to a survey related to your  clinical pharmacist.\"     "

## 2022-05-11 NOTE — PROGRESS NOTES
Medication Therapy Management (MTM) Encounter    ASSESSMENT:                            1. Type 2 diabetes mellitus   Uncontrolled with most recent A1c 8.6%, above goal <7% per ADA guidelines. Recommend addition of GLP-1 agonist for added benefits of weight loss, CV risk reduction, and preservation of pancreatic beta cell function. Will start Ozempic. Medication education and counseling was provided, including indication, expected effect, dosing instructions, and possible side effects. Patient demonstrated understanding of injection technique with use of demo pen. The patient's questions were answered. With this addition will decrease glipizide dose for now and may be able to discontinue completely depending on response to GLP-1 agonist. Advised resuming self-monitoring of blood sugars. Will send in prescription for new glucometer and testing supplies. Reviewed blood sugar goals, both fasting and post-prandial. Encouraged to continue with healthy diet and exercise as he is doing.     2. Hypertension  Blood pressure is well controlled and meeting goal of <140/90 mm Hg per JNC-8 hypertension guidelines.     3. Hyperlipidemia  Appropriately on a moderate intensity statin given age and diabetes diagnosis per ACC/AHA guidelines. Advised moving simvastatin to evening administration for optimal effect.     PLAN:                            Start Ozempic. Inject 0.25 mg once a week for 4 weeks, then increase to 0.5 mg injected once a week.     Decrease glipizide XL to 1 tablet (10 mg) daily.     Check blood sugars twice daily. New glucometer and testing supplies sent to pharmacy.    Change simvastatin 40 mg every evening.     Follow-up: Return in about 6 weeks (around 6/22/2022).    SUBJECTIVE/OBJECTIVE:                          Bob Neely is a 52 year old male coming in for an initial visit. He was referred to me from Andres Gagnon      Reason for visit: diabetes    Allergies/ADRs: Reviewed in chart  Past  Medical History: Reviewed in chart  Tobacco: He reports that he has never smoked. He has never used smokeless tobacco.  Alcohol: yes, rarely    Medication Adherence/Access: no issues reported    1. Type 2 diabetes mellitus  Currently taking metformin 1000 mg twice daily and glipizide XL 20 mg daily. Patient is not experiencing side effects.. He's had diabetes for about 10 years. Has noticed over the past couple years control is worsening despite keeping a good diet and exercising.   Blood sugar monitoring: never. Has tested in the past, but not recently. Needs new meter.   Symptoms of low blood sugar? none  Symptoms of high blood sugar? none  Diet/Exercise: Follows good diabetic diet and is physically active  Aspirin: Not taking due to age/low risk  Statin: Yes: simvastatin    ACEi/ARB: Yes: lisinopril.     Hemoglobin A1C   Date Value Ref Range Status   04/22/2022 8.6 (H) 0.0 - 5.6 % Final     Comment:     Normal <5.7%   Prediabetes 5.7-6.4%    Diabetes 6.5% or higher     Note: Adopted from ADA consensus guidelines.   08/20/2021 7.9 (H) 0.0 - 5.6 % Final     Comment:     Normal <5.7%   Prediabetes 5.7-6.4%    Diabetes 6.5% or higher     Note: Adopted from ADA consensus guidelines.   01/25/2021 7.0 (H) <=5.6 % Final      Microalbumin Urine mg/dL   Date Value Ref Range Status   08/20/2021 1.68 0.00 - 1.99 mg/dL Final      Creatinine Urine mg/dL   Date Value Ref Range Status   08/20/2021 242 mg/dL Final     LDL Cholesterol Calculated   Date Value Ref Range Status   04/22/2022 47 <=129 mg/dL Final     Creatinine   Date Value Ref Range Status   04/22/2022 1.24 0.70 - 1.30 mg/dL Final       2. Hypertension  Current medications include lisinopril 20 mg daily.  Patient does not self-monitor blood pressure.  Patient reports no current medication side effects.  BP Readings from Last 3 Encounters:   04/22/22 112/71   08/20/21 120/74   01/25/21 110/73     3. Hyperlipidemia  Current therapy includes simvastatin 40 mg every  morning.  Patient reports no significant myalgias or other side effects.    The ASCVD Risk score (Zohracarlie ELIZABETH Jr., et al., 2013) failed to calculate for the following reasons:    The valid total cholesterol range is 130 to 320 mg/dL     Recent Labs   Lab Test 04/22/22  0934 08/20/21  0959   CHOL 112 145   HDL 36* 50   LDL 47 68   TRIG 144 134       Vitals:   BP Readings from Last 3 Encounters:   04/22/22 112/71   08/20/21 120/74   01/25/21 110/73      Pulse Readings from Last 3 Encounters:   04/22/22 76   08/20/21 76   01/25/21 71     Wt Readings from Last 3 Encounters:   04/22/22 (!) 334 lb 8 oz (151.7 kg)   08/20/21 (!) 338 lb 9.6 oz (153.6 kg)   01/25/21 (!) 333 lb 14.4 oz (151.5 kg)     ----------------    I spent 30 minutes with this patient today. All changes were made via collaborative practice agreement with Andres Dc MD. A copy of the visit note was provided to the patient's provider(s).    The patient was given a summary of these recommendations.     Mayda Dupree, PharmD, [facility]  Medication Management (MTM) Pharmacist  [facility]        Medication Therapy Recommendations  Hyperlipidemia    Current Medication: simvastatin (ZOCOR) 40 MG tablet   Rationale: Incorrect administration - Dosage too low - Effectiveness   Recommendation: Change Administration Time   Status: Patient Agreed - Adherence/Education         Type 2 diabetes mellitus (H)    Current Medication: glipiZIDE (GLUCOTROL XL) 10 MG 24 hr tablet   Rationale: Dose too high - Dosage too high - Safety   Recommendation: Decrease Dose   Status: Accepted per CPA          Current Medication: semaglutide (OZEMPIC, 0.25 OR 0.5 MG/DOSE,) 2 MG/1.5ML SOPN pen   Rationale: Synergistic therapy - Needs additional medication therapy - Indication   Recommendation: Start Medication   Status: Accepted per CPA

## 2022-06-14 NOTE — PROGRESS NOTES
Medication Therapy Management (MTM) Encounter    ASSESSMENT:                            1. Type 2 diabetes mellitus   Most recent A1c 8.6%, above goal <7% per ADA guidelines, but with noted improvement in blood sugars after addition of GLP-1 agonist, Ozempic. Will continue four more weeks at 0.5 mg dose with plan to then increase to 1 mg and stop glipizide.     2. Hypertension  Blood pressure is well controlled and meeting goal of <140/90 mm Hg per JNC-8 hypertension guidelines.     3. Hyperlipidemia  Appropriately on a moderate intensity statin given age and diabetes diagnosis per ACC/AHA guidelines.     PLAN:                            No medication changes today.     Follow-up: Return in about 1 month (around 7/15/2022).    SUBJECTIVE/OBJECTIVE:                          Bob Neely is a 52 year old male called for a follow up visit. He was referred to me from Andres Gagnon. This was a follow up from 5/11/22.      Reason for visit: diabetes/Ozempic follow up    Allergies/ADRs: Reviewed in chart  Past Medical History: Reviewed in chart  Tobacco: He reports that he has never smoked. He has never used smokeless tobacco.  Alcohol: yes, rarely    Medication Adherence/Access: no issues reported    1. Type 2 diabetes mellitus  Currently taking metformin 1000 mg twice daily, glipizide XL 10 mg daily, and Ozempic 0.5 mg subcutaneous weekly. At our last visit, Ozempic was started and glipizide dose decreased. Patient is not experiencing side effects, besides some loose stool at times, but minor. He is feeling full faster with less of an appetite.   Blood sugar monitoring: 3-4 times per day; majority of values mid-upper 100s; can be highest close to 200 in the morning. Says checking blood sugars this often motivates him.   Symptoms of low blood sugar? none  Symptoms of high blood sugar? none  Diet/Exercise: Follows good diabetic diet and is physically active  Aspirin: Not taking due to age/low risk  Statin: Yes:  simvastatin    ACEi/ARB: Yes: lisinopril.     Hemoglobin A1C   Date Value Ref Range Status   04/22/2022 8.6 (H) 0.0 - 5.6 % Final     Comment:     Normal <5.7%   Prediabetes 5.7-6.4%    Diabetes 6.5% or higher     Note: Adopted from ADA consensus guidelines.   08/20/2021 7.9 (H) 0.0 - 5.6 % Final     Comment:     Normal <5.7%   Prediabetes 5.7-6.4%    Diabetes 6.5% or higher     Note: Adopted from ADA consensus guidelines.   01/25/2021 7.0 (H) <=5.6 % Final      Microalbumin Urine mg/dL   Date Value Ref Range Status   08/20/2021 1.68 0.00 - 1.99 mg/dL Final      Creatinine Urine mg/dL   Date Value Ref Range Status   08/20/2021 242 mg/dL Final     LDL Cholesterol Calculated   Date Value Ref Range Status   04/22/2022 47 <=129 mg/dL Final     Creatinine   Date Value Ref Range Status   04/22/2022 1.24 0.70 - 1.30 mg/dL Final     2. Hypertension  Current medications include lisinopril 20 mg daily.  Patient does not self-monitor blood pressure.  Patient reports no current medication side effects.  BP Readings from Last 3 Encounters:   04/22/22 112/71   08/20/21 120/74   01/25/21 110/73     3. Hyperlipidemia  Current therapy includes simvastatin 40 mg every evening.  Patient reports no significant myalgias or other side effects.    The ASCVD Risk score (Zohra GLENN Jr., et al., 2013) failed to calculate for the following reasons:    The valid total cholesterol range is 130 to 320 mg/dL     Recent Labs   Lab Test 04/22/22  0934 08/20/21  0959   CHOL 112 145   HDL 36* 50   LDL 47 68   TRIG 144 134       Vitals:   BP Readings from Last 3 Encounters:   04/22/22 112/71   08/20/21 120/74   01/25/21 110/73      Pulse Readings from Last 3 Encounters:   04/22/22 76   08/20/21 76   01/25/21 71     Wt Readings from Last 3 Encounters:   04/22/22 (!) 334 lb 8 oz (151.7 kg)   08/20/21 (!) 338 lb 9.6 oz (153.6 kg)   01/25/21 (!) 333 lb 14.4 oz (151.5 kg)     ----------------    I spent 15 minutes with this patient today. All changes were  made via collaborative practice agreement with Andres Dc MD. A copy of the visit note was provided to the patient's provider(s).    The patient was given a summary of these recommendations via [employer]     Mayda Dupree, PharmD, BCACP  Medication Management (MTM) Pharmacist  [facility]       Telemedicine Visit Details  Type of service:  Telephone visit  Start Time: 9:00 AM  End Time: 9:15 AM  Originating Location (patient location): Home  Distant Location (provider location):  [facility]     Medication Therapy Recommendations  No medication therapy recommendations to display

## 2022-06-15 ENCOUNTER — VIRTUAL VISIT (OUTPATIENT)
Dept: PHARMACY | Facility: CLINIC | Age: 52
End: 2022-06-15
Payer: COMMERCIAL

## 2022-06-15 DIAGNOSIS — E11.69 TYPE 2 DIABETES MELLITUS WITH OTHER SPECIFIED COMPLICATION, WITHOUT LONG-TERM CURRENT USE OF INSULIN (H): Primary | ICD-10-CM

## 2022-06-15 DIAGNOSIS — E78.49 OTHER HYPERLIPIDEMIA: ICD-10-CM

## 2022-06-15 DIAGNOSIS — I10 ESSENTIAL HYPERTENSION: ICD-10-CM

## 2022-06-15 PROCEDURE — 99606 MTMS BY PHARM EST 15 MIN: CPT | Performed by: PHARMACIST

## 2022-07-13 ENCOUNTER — VIRTUAL VISIT (OUTPATIENT)
Dept: PHARMACY | Facility: CLINIC | Age: 52
End: 2022-07-13
Payer: COMMERCIAL

## 2022-07-13 DIAGNOSIS — E78.49 OTHER HYPERLIPIDEMIA: ICD-10-CM

## 2022-07-13 DIAGNOSIS — E11.69 TYPE 2 DIABETES MELLITUS WITH OTHER SPECIFIED COMPLICATION, WITHOUT LONG-TERM CURRENT USE OF INSULIN (H): Primary | ICD-10-CM

## 2022-07-13 DIAGNOSIS — I10 ESSENTIAL HYPERTENSION: ICD-10-CM

## 2022-07-13 PROCEDURE — 99606 MTMS BY PHARM EST 15 MIN: CPT | Performed by: PHARMACIST

## 2022-07-13 RX ORDER — SEMAGLUTIDE 1.34 MG/ML
1 INJECTION, SOLUTION SUBCUTANEOUS WEEKLY
Qty: 9 ML | Refills: 3 | Status: SHIPPED | OUTPATIENT
Start: 2022-07-13 | End: 2022-09-12

## 2022-07-13 NOTE — PROGRESS NOTES
Medication Therapy Management (MTM) Encounter    ASSESSMENT:                            1. Type 2 diabetes mellitus   Most recent A1c 8.6%, above goal <7% per ADA guidelines, but with noted improvement in blood sugars after addition of GLP-1 agonist, Ozempic. Will continue with dose titration and increase to 1 mg and stop glipizide.     2. Hypertension  Blood pressure is well controlled and meeting goal of <140/90 mm Hg per JNC-8 hypertension guidelines.     3. Hyperlipidemia  Appropriately on a moderate intensity statin given age and diabetes diagnosis per ACC/AHA guidelines.     PLAN:                            Increase Ozempic to 1 mg subcutaneous weekly.     Stop glipizide.     Follow-up: Return in about 1 month (around 8/13/2022).    SUBJECTIVE/OBJECTIVE:                          Bob Neely is a 52 year old male called for a follow up visit. He was referred to me from Andres Gagnon. This was a follow up from 6/15/22.      Reason for visit: diabetes/Ozempic follow up    Allergies/ADRs: Reviewed in chart  Past Medical History: Reviewed in chart  Tobacco: He reports that he has never smoked. He has never used smokeless tobacco.  Alcohol: yes, rarely    Medication Adherence/Access: no issues reported    1. Type 2 diabetes mellitus  Currently taking metformin 1000 mg twice daily, glipizide XL 10 mg daily, and Ozempic 0.5 mg subcutaneous weekly. At previous visit, Ozempic was started and glipizide dose decreased. Patient is not experiencing side effects. Did have some loose stool at times, but resolved. He is feeling full faster with less of an appetite.   Blood sugar monitoring: 3-4 times per day; majority of values mid-upper 100s; can be highest close to 200 in the morning. Says checking blood sugars this often motivates him.   Symptoms of low blood sugar? none  Symptoms of high blood sugar? none  Diet/Exercise: Follows good diabetic diet and is physically active  Aspirin: Not taking due to age/low  risk  Statin: Yes: simvastatin    ACEi/ARB: Yes: lisinopril    Hemoglobin A1C   Date Value Ref Range Status   04/22/2022 8.6 (H) 0.0 - 5.6 % Final     Comment:     Normal <5.7%   Prediabetes 5.7-6.4%    Diabetes 6.5% or higher     Note: Adopted from ADA consensus guidelines.   08/20/2021 7.9 (H) 0.0 - 5.6 % Final     Comment:     Normal <5.7%   Prediabetes 5.7-6.4%    Diabetes 6.5% or higher     Note: Adopted from ADA consensus guidelines.   01/25/2021 7.0 (H) <=5.6 % Final      Microalbumin Urine mg/dL   Date Value Ref Range Status   08/20/2021 1.68 0.00 - 1.99 mg/dL Final      Creatinine Urine mg/dL   Date Value Ref Range Status   08/20/2021 242 mg/dL Final     LDL Cholesterol Calculated   Date Value Ref Range Status   04/22/2022 47 <=129 mg/dL Final     Creatinine   Date Value Ref Range Status   04/22/2022 1.24 0.70 - 1.30 mg/dL Final     2. Hypertension  Current medications include lisinopril 20 mg daily.  Patient does not self-monitor blood pressure.  Patient reports no current medication side effects.  BP Readings from Last 3 Encounters:   04/22/22 112/71   08/20/21 120/74   01/25/21 110/73     3. Hyperlipidemia  Current therapy includes simvastatin 40 mg every evening.  Patient reports no significant myalgias or other side effects.    The ASCVD Risk score (Zohracarlie ELIZABETH Jr., et al., 2013) failed to calculate for the following reasons:    The valid total cholesterol range is 130 to 320 mg/dL     Recent Labs   Lab Test 04/22/22  0934 08/20/21  0959   CHOL 112 145   HDL 36* 50   LDL 47 68   TRIG 144 134       Vitals:   BP Readings from Last 3 Encounters:   04/22/22 112/71   08/20/21 120/74   01/25/21 110/73      Pulse Readings from Last 3 Encounters:   04/22/22 76   08/20/21 76   01/25/21 71     Wt Readings from Last 3 Encounters:   04/22/22 (!) 334 lb 8 oz (151.7 kg)   08/20/21 (!) 338 lb 9.6 oz (153.6 kg)   01/25/21 (!) 333 lb 14.4 oz (151.5 kg)     ----------------    I spent 10 minutes with this patient today. All  changes were made via collaborative practice agreement with Andres Dc MD. A copy of the visit note was provided to the patient's provider(s).    The patient was given a summary of these recommendations via [employer].     Mayda Dupree, PharmD, BCACP  Medication Management (MTM) Pharmacist  [facility]       Telemedicine Visit Details  Type of service:  Telephone visit  Start Time: 9:00 AM  End Time: 9:10 AM  Originating Location (patient location): Home  Distant Location (provider location):  [facility]     Medication Therapy Recommendations  Type 2 diabetes mellitus (H)    Current Medication: glipiZIDE (GLUCOTROL XL) 10 MG 24 hr tablet (Discontinued)   Rationale: More effective medication available - Ineffective medication - Effectiveness   Recommendation: Discontinue Medication   Status: Accepted per CPA          Current Medication: semaglutide (OZEMPIC, 0.25 OR 0.5 MG/DOSE,) 2 MG/1.5ML SOPN pen (Discontinued)   Rationale: Dose too low - Dosage too low - Effectiveness   Recommendation: Increase Dose   Status: Accepted per CPA

## 2022-07-13 NOTE — PATIENT INSTRUCTIONS
"Recommendations from today's Medication Management (MTM) visit:                                                      Increase Ozempic to 1 mg injected weekly.     Stop glipizide.     Blood Sugar Goals  Before meals:   2 hours after meal: less than 180       To schedule another MTM appointment, please call the MTM scheduling line at [phone] or toll-free at [phone].     My MTM pharmacist's contact information:      Please feel free to contact me with any questions or concerns you have via [employer] or calling the clinic.       Mayda Dupree, PharmD, [facility]  Medication Management (MTM) Pharmacist  [facility]     It was great speaking with you today.  I value your experience and would be very thankful for your time in providing feedback in our clinic survey. In the next few days, you may receive an email or text message from [facility] with a link to a survey related to your  clinical pharmacist.\"     "

## 2022-07-23 ENCOUNTER — HEALTH MAINTENANCE LETTER (OUTPATIENT)
Age: 52
End: 2022-07-23

## 2022-08-09 NOTE — PROGRESS NOTES
Medication Therapy Management (MTM) Encounter    ASSESSMENT:                            1. Type 2 diabetes mellitus   Most recent A1c 8.6%, above goal <7% per ADA guidelines, but with noted improvement in blood sugars after addition of GLP-1 agonist, Ozempic. Still above goal, but trending down. Have been able to stop glipizide. Will continue with current dosing of Ozempic for now given recent resolution of GI side effects, but could increase to 2 mg if needed at follow up based on blood sugars or consider SGLT-2 inhibitor.     2. Hypertension  Blood pressure is well controlled and meeting goal of <140/90 mm Hg per JNC-8 hypertension guidelines.     3. Hyperlipidemia  Appropriately on a moderate intensity statin given age and diabetes diagnosis per ACC/AHA guidelines.     PLAN:                            No medication changes today.     Follow-up: Return in about 1 month (around 9/10/2022).    SUBJECTIVE/OBJECTIVE:                          Bob Neely is a 52 year old male called for a follow up visit. He was referred to me from Andres Gagnon. This was a follow up from 7/13/22.      Reason for visit: diabetes/Ozempic follow up    Allergies/ADRs: Reviewed in chart  Past Medical History: Reviewed in chart  Tobacco: He reports that he has never smoked. He has never used smokeless tobacco.  Alcohol: yes, rarely    Medication Adherence/Access: no issues reported    1. Type 2 diabetes mellitus  Currently taking metformin 1000 mg twice daily and Ozempic 1 mg subcutaneous weekly. At previous visit, Ozempic was increased and glipizide stopped. He did notice diarrhea the first couple weeks of increasing the dose, but has improved. He is feeling full faster with less of an appetite. Eating smaller portions and no longer snacking. He has been losing weight, down 20 pounds, which he is excited about.   Blood sugar monitoring: 3-4 times per day; majority of values have been in 190s in the morning. Stays below 210  throughout the day. Says checking blood sugars this often motivates him.   Symptoms of low blood sugar? none  Symptoms of high blood sugar? none  Diet/Exercise: Follows good diabetic diet and is physically active  Aspirin: Not taking due to age/low risk  Statin: Yes: simvastatin    ACEi/ARB: Yes: lisinopril    Hemoglobin A1C   Date Value Ref Range Status   04/22/2022 8.6 (H) 0.0 - 5.6 % Final     Comment:     Normal <5.7%   Prediabetes 5.7-6.4%    Diabetes 6.5% or higher     Note: Adopted from ADA consensus guidelines.   08/20/2021 7.9 (H) 0.0 - 5.6 % Final     Comment:     Normal <5.7%   Prediabetes 5.7-6.4%    Diabetes 6.5% or higher     Note: Adopted from ADA consensus guidelines.   01/25/2021 7.0 (H) <=5.6 % Final      Microalbumin Urine mg/dL   Date Value Ref Range Status   08/20/2021 1.68 0.00 - 1.99 mg/dL Final      Creatinine Urine mg/dL   Date Value Ref Range Status   08/20/2021 242 mg/dL Final     LDL Cholesterol Calculated   Date Value Ref Range Status   04/22/2022 47 <=129 mg/dL Final     Creatinine   Date Value Ref Range Status   04/22/2022 1.24 0.70 - 1.30 mg/dL Final     2. Hypertension  Current medications include lisinopril 20 mg daily.  Patient does not self-monitor blood pressure.  Patient reports no current medication side effects.  BP Readings from Last 3 Encounters:   04/22/22 112/71   08/20/21 120/74   01/25/21 110/73     3. Hyperlipidemia  Current therapy includes simvastatin 40 mg every evening.  Patient reports no significant myalgias or other side effects.    The ASCVD Risk score (Zohracarlie ELIZABETH Jr., et al., 2013) failed to calculate for the following reasons:    The valid total cholesterol range is 130 to 320 mg/dL     Recent Labs   Lab Test 04/22/22  0934 08/20/21  0959   CHOL 112 145   HDL 36* 50   LDL 47 68   TRIG 144 134       Vitals:   BP Readings from Last 3 Encounters:   04/22/22 112/71   08/20/21 120/74   01/25/21 110/73      Pulse Readings from Last 3 Encounters:   04/22/22 76    08/20/21 76   01/25/21 71     Wt Readings from Last 3 Encounters:   04/22/22 (!) 334 lb 8 oz (151.7 kg)   08/20/21 (!) 338 lb 9.6 oz (153.6 kg)   01/25/21 (!) 333 lb 14.4 oz (151.5 kg)     ----------------    I spent 15 minutes with this patient today. All changes were made via collaborative practice agreement with Andres Dc MD. A copy of the visit note was provided to the patient's provider(s).    The patient was given a summary of these recommendations via [employer].     Mayda Dupree, PharmD, BCACP  Medication Management (MTM) Pharmacist  [facility] & [facility]       Telemedicine Visit Details  Type of service:  Telephone visit  Start Time: 9:00 AM  End Time: 9:15 AM  Originating Location (patient location): Home  Distant Location (provider location):  [facility]     Medication Therapy Recommendations  No medication therapy recommendations to display

## 2022-08-10 ENCOUNTER — VIRTUAL VISIT (OUTPATIENT)
Dept: PHARMACY | Facility: CLINIC | Age: 52
End: 2022-08-10
Payer: COMMERCIAL

## 2022-08-10 DIAGNOSIS — E78.49 OTHER HYPERLIPIDEMIA: ICD-10-CM

## 2022-08-10 DIAGNOSIS — I10 ESSENTIAL HYPERTENSION: ICD-10-CM

## 2022-08-10 DIAGNOSIS — E11.69 TYPE 2 DIABETES MELLITUS WITH OTHER SPECIFIED COMPLICATION, WITHOUT LONG-TERM CURRENT USE OF INSULIN (H): Primary | ICD-10-CM

## 2022-08-10 PROCEDURE — 99606 MTMS BY PHARM EST 15 MIN: CPT | Performed by: PHARMACIST

## 2022-08-13 DIAGNOSIS — I10 ESSENTIAL (PRIMARY) HYPERTENSION: ICD-10-CM

## 2022-08-13 DIAGNOSIS — E11.69 TYPE 2 DIABETES MELLITUS WITH OTHER SPECIFIED COMPLICATION, WITHOUT LONG-TERM CURRENT USE OF INSULIN (H): ICD-10-CM

## 2022-08-13 RX ORDER — LISINOPRIL 20 MG/1
TABLET ORAL
Qty: 90 TABLET | Refills: 2 | Status: SHIPPED | OUTPATIENT
Start: 2022-08-13 | End: 2023-04-25

## 2022-08-14 NOTE — TELEPHONE ENCOUNTER
"Routing refill request to provider for review/approval because:  Drug not active on patient's medication list  glipizide  Labs out of range:  a1c    Last Written Prescription Date:  8/20/21  Last Fill Quantity: 180,  # refills: 3   Last office visit provider:  4/22/22     Requested Prescriptions   Pending Prescriptions Disp Refills     metFORMIN (GLUCOPHAGE) 1000 MG tablet [Pharmacy Med Name: METFORMIN HCL 1,000 MG TABLET] 180 tablet 3     Sig: [METFORMIN (GLUCOPHAGE) 1000 MG TABLET] TAKE 1 TABLET BY MOUTH TWICE A DAY WITH FOOD       Biguanide Agents Failed - 8/13/2022  8:31 AM        Failed - Patient has documented A1c within the specified period of time.     If HgbA1C is 8 or greater, it needs to be on file within the past 3 months.  If less than 8, must be on file within the past 6 months.     Recent Labs   Lab Test 04/22/22  0934   A1C 8.6*             Passed - Patient is age 10 or older        Passed - Patient's CR is NOT>1.4 OR Patient's EGFR is NOT<45 within past 12 mos.     Recent Labs   Lab Test 04/22/22  0934 08/20/21  0959 01/25/21  1325   GFRESTIMATED 69   < > >60   GFRESTBLACK  --   --  >60    < > = values in this interval not displayed.       Recent Labs   Lab Test 04/22/22  0934   CR 1.24             Passed - Patient does NOT have a diagnosis of CHF.        Passed - Medication is active on med list        Passed - Recent (6 mo) or future (30 days) visit within the authorizing provider's specialty     Patient had office visit in the last 6 months or has a visit in the next 30 days with authorizing provider or within the authorizing provider's specialty.  See \"Patient Info\" tab in inbasket, or \"Choose Columns\" in Meds & Orders section of the refill encounter.               lisinopril (ZESTRIL) 20 MG tablet [Pharmacy Med Name: LISINOPRIL 20 MG TABLET] 90 tablet 3     Sig: TAKE 1 TABLET BY MOUTH EVERY DAY       ACE Inhibitors (Including Combos) Protocol Passed - 8/13/2022  8:31 AM        Passed - Blood " "pressure under 140/90 in past 12 months     BP Readings from Last 3 Encounters:   04/22/22 112/71   08/20/21 120/74   01/25/21 110/73                 Passed - Recent (12 mo) or future (30 days) visit within the authorizing provider's specialty     Patient has had an office visit with the authorizing provider or a provider within the authorizing providers department within the previous 12 mos or has a future within next 30 days. See \"Patient Info\" tab in inbasket, or \"Choose Columns\" in Meds & Orders section of the refill encounter.              Passed - Medication is active on med list        Passed - Patient is age 18 or older        Passed - Normal serum creatinine on file in past 12 months     Recent Labs   Lab Test 04/22/22  0934   CR 1.24       Ok to refill medication if creatinine is low          Passed - Normal serum potassium on file in past 12 months     Recent Labs   Lab Test 04/22/22  0934   POTASSIUM 4.6                glipiZIDE (GLUCOTROL XL) 10 MG 24 hr tablet [Pharmacy Med Name: GLIPIZIDE ER 10 MG TABLET] 180 tablet 3     Sig: TAKE 1 TAB BY MOUTH TWICE DAILY       Sulfonylurea Agents Failed - 8/13/2022  8:31 AM        Failed - Patient has documented A1c within the specified period of time.     If HgbA1C is 8 or greater, it needs to be on file within the past 3 months.  If less than 8, must be on file within the past 6 months.     Recent Labs   Lab Test 04/22/22  0934   A1C 8.6*             Failed - Medication is active on med list        Passed - Patient is age 18 or older        Passed - Patient has a recent creatinine (normal) within the past 12 mos.     Recent Labs   Lab Test 04/22/22  0934   CR 1.24       Ok to refill medication if creatinine is low          Passed - Recent (6 mo) or future (30 days) visit within the authorizing provider's specialty     Patient had office visit in the last 6 months or has a visit in the next 30 days with authorizing provider or within the authorizing provider's " "specialty.  See \"Patient Info\" tab in inbasket, or \"Choose Columns\" in Meds & Orders section of the refill encounter.                 Kim Simmons RN 08/13/22 10:46 PM  "

## 2022-08-15 RX ORDER — GLIPIZIDE 10 MG/1
TABLET, FILM COATED, EXTENDED RELEASE ORAL
Qty: 180 TABLET | Refills: 3 | Status: SHIPPED | OUTPATIENT
Start: 2022-08-15 | End: 2022-09-07

## 2022-09-07 NOTE — PROGRESS NOTES
Medication Therapy Management (MTM) Encounter    ASSESSMENT:                            1. Type 2 diabetes mellitus   Most recent A1c 8.6%, above goal <7% per ADA guidelines. Some improvement in blood sugars after addition of GLP-1 agonist, Ozempic, although completely blood sugars have been higher since stopping glipizide.  Would ideally like to stay off glipizide to help with weight loss.  Given blood sugars above goal, discussed options including increasing Ozempic dose, SGLT2 inhibitor, or changing Ozempic to Mounjaro.  Ozempic 2 mg dose is currently on backorder until the end of next month and given patient is no longer noticing appetite suppression or weight loss with Ozempic, recommend changing to Mounjaro. This is a combination GLP-1/GIP agonist, showing superior blood sugar control and weight loss compared to GLP-1 agonists. Patient is agreeable. Medication education provided. Starting dose is 2.5 mg injected weekly, titrating up to maximum dose of 15 mg weekly, if needed and tolerated.     2. Hypertension  Blood pressure is well controlled and meeting goal of <140/90 mm Hg per JNC-8 hypertension guidelines.     3. Hyperlipidemia  Appropriately on a moderate intensity statin given age and diabetes diagnosis per ACC/AHA guidelines.     PLAN:                            Stop Ozempic.     Start Mounjaro 2.5 mg subcutaneous weekly.     Follow-up: Return in about 1 month (around 10/12/2022).    SUBJECTIVE/OBJECTIVE:                          Bob Neely is a 52 year old male called for a follow up visit. He was referred to me from Andres Gagnon. This is a follow up from 8/10/22.      Reason for visit: diabetes/Ozempic follow up    Allergies/ADRs: Reviewed in chart  Past Medical History: Reviewed in chart  Tobacco: He reports that he has never smoked. He has never used smokeless tobacco.  Alcohol: yes, rarely    Medication Adherence/Access: no issues reported    1. Type 2 diabetes mellitus  Currently  taking metformin 1000 mg twice daily and Ozempic 1 mg subcutaneous weekly. At previous visit, Ozempic was increased and glipizide stopped. He did notice diarrhea the first couple weeks of increasing the dose, but this has resolved.  Unfortunately the initial appetite suppression that he had with Ozempic is now gone and he is feeling hungry all the time.  He did lose 20 pounds after starting Ozempic, but has regained about 5 pounds.  Notes that blood sugars are typically 195-220 in the morning.  Highest of 224.  Lowest of 168.  Symptoms of low blood sugar? none  Symptoms of high blood sugar? none  Diet/Exercise: Follows good diabetic diet and is physically active  Aspirin: Not taking due to age/low risk  Statin: Yes: simvastatin    ACEi/ARB: Yes: lisinopril    Hemoglobin A1C   Date Value Ref Range Status   04/22/2022 8.6 (H) 0.0 - 5.6 % Final     Comment:     Normal <5.7%   Prediabetes 5.7-6.4%    Diabetes 6.5% or higher     Note: Adopted from ADA consensus guidelines.   08/20/2021 7.9 (H) 0.0 - 5.6 % Final     Comment:     Normal <5.7%   Prediabetes 5.7-6.4%    Diabetes 6.5% or higher     Note: Adopted from ADA consensus guidelines.   01/25/2021 7.0 (H) <=5.6 % Final      Microalbumin Urine mg/dL   Date Value Ref Range Status   08/20/2021 1.68 0.00 - 1.99 mg/dL Final      Creatinine Urine mg/dL   Date Value Ref Range Status   08/20/2021 242 mg/dL Final     LDL Cholesterol Calculated   Date Value Ref Range Status   04/22/2022 47 <=129 mg/dL Final     Creatinine   Date Value Ref Range Status   04/22/2022 1.24 0.70 - 1.30 mg/dL Final     2. Hypertension  Current medications include lisinopril 20 mg daily.  Patient does not self-monitor blood pressure.  Patient reports no current medication side effects.  BP Readings from Last 3 Encounters:   04/22/22 112/71   08/20/21 120/74   01/25/21 110/73     3. Hyperlipidemia  Current therapy includes simvastatin 40 mg every evening.  Patient reports no significant myalgias or  other side effects.    The ASCVD Risk score (Huffmancarlie ELIZABETH Jr., et al., 2013) failed to calculate for the following reasons:    The valid total cholesterol range is 130 to 320 mg/dL     Recent Labs   Lab Test 04/22/22  0934 08/20/21  0959   CHOL 112 145   HDL 36* 50   LDL 47 68   TRIG 144 134       Vitals:   BP Readings from Last 3 Encounters:   04/22/22 112/71   08/20/21 120/74   01/25/21 110/73      Pulse Readings from Last 3 Encounters:   04/22/22 76   08/20/21 76   01/25/21 71     Wt Readings from Last 3 Encounters:   04/22/22 (!) 334 lb 8 oz (151.7 kg)   08/20/21 (!) 338 lb 9.6 oz (153.6 kg)   01/25/21 (!) 333 lb 14.4 oz (151.5 kg)     ----------------    I spent 20 minutes with this patient today. All changes were made via collaborative practice agreement with Andres Dc MD. A copy of the visit note was provided to the patient's provider(s).    The patient was given a summary of these recommendations via [email or web address].     Mayda Dupree, PharmD, BCACP  Medication Management (MTM) Pharmacist  [facility] & [facility]       Telemedicine Visit Details  Type of service:  Telephone visit  Start Time: 9:00 AM  End Time: 9:20 AM  Originating Location (patient location): Home  Distant Location (provider location):  [facility]     Medication Therapy Recommendations  Type 2 diabetes mellitus (H)    Current Medication: Semaglutide, 1 MG/DOSE, (OZEMPIC, 1 MG/DOSE,) 4 MG/3ML SOPN (Discontinued)   Rationale: More effective medication available - Ineffective medication - Effectiveness   Recommendation: Change Medication - Mounjaro 2.5 MG/0.5ML Sopn   Status: Accepted per Provider

## 2022-09-12 ENCOUNTER — TELEPHONE (OUTPATIENT)
Dept: FAMILY MEDICINE | Facility: CLINIC | Age: 52
End: 2022-09-12

## 2022-09-12 ENCOUNTER — VIRTUAL VISIT (OUTPATIENT)
Dept: PHARMACY | Facility: CLINIC | Age: 52
End: 2022-09-12
Payer: COMMERCIAL

## 2022-09-12 DIAGNOSIS — E11.69 TYPE 2 DIABETES MELLITUS WITH OTHER SPECIFIED COMPLICATION, WITHOUT LONG-TERM CURRENT USE OF INSULIN (H): ICD-10-CM

## 2022-09-12 DIAGNOSIS — E11.69 TYPE 2 DIABETES MELLITUS WITH OTHER SPECIFIED COMPLICATION, WITHOUT LONG-TERM CURRENT USE OF INSULIN (H): Primary | ICD-10-CM

## 2022-09-12 DIAGNOSIS — I10 ESSENTIAL HYPERTENSION: ICD-10-CM

## 2022-09-12 DIAGNOSIS — E78.49 OTHER HYPERLIPIDEMIA: ICD-10-CM

## 2022-09-12 PROCEDURE — 99607 MTMS BY PHARM ADDL 15 MIN: CPT | Performed by: PHARMACIST

## 2022-09-12 PROCEDURE — 99606 MTMS BY PHARM EST 15 MIN: CPT | Performed by: PHARMACIST

## 2022-09-12 RX ORDER — TIRZEPATIDE 2.5 MG/.5ML
2.5 INJECTION, SOLUTION SUBCUTANEOUS WEEKLY
Qty: 2 ML | Refills: 1 | Status: SHIPPED | OUTPATIENT
Start: 2022-09-12 | End: 2022-10-10

## 2022-09-12 NOTE — TELEPHONE ENCOUNTER
Central Prior Authorization Team - Phone: [phone]     PRIOR AUTHORIZATION DENIED    Medication: Tirzepatide (MOUNJARO) 2.5 MG/0.5ML- PA denied    Denial Date: 9/12/2022    Denial Rational: PATIENT HAS ONLY TRIED ONE PREFERRED PRODUCT. PATIENT MUST HAVE TRIED TWO (ONE ADDITIONAL) PREFERRED PRODUCTS.    PREFERRED INCLUDE: BYETTA, BYDUREON, OZEMPIC, RYBELSUS, TRULICITY AND VICTOZA                    Appeal Information:. If the provider would like to appeal, please provide a letter of medical necessity and route back to the team. Otherwise you can close the encounter. Thank you, Central PA Team

## 2022-09-12 NOTE — TELEPHONE ENCOUNTER
[facility]ALO PA REQUEST    Prior Authorization Retail Medication Request    Medication/Dose: Mounjaro 2.5 mg weekly injection  ICD code (if different than what is on RX):    Previously Tried and Failed:  metformin, glipizide, Ozempic  Rationale:  A1c elevated above goal and also overweight with BMI 38.6 kg/m2. Minimal response to Ozempic so would like to try Mounjaro, which has shown superior weight loss and A1c lowering in studies.     Insurance Name:  [employer]  Insurance ID:  [id number]      Pharmacy Information (if different than what is on RX)  Name:    Phone:          *Please forward response to Mayda Dupree, PharmD*

## 2022-09-12 NOTE — TELEPHONE ENCOUNTER
Since prior authorization is denied, will utilize  savings card to reduce price to $25. Patient notified via [employer].       Debbie SuarezD, BCACP  Medication Management (MTM) Pharmacist  [facility]

## 2022-09-12 NOTE — TELEPHONE ENCOUNTER
Central Prior Authorization Team - Phone: [phone]     PA Initiation    Medication: Tirzepatide (MOUNJARO) 2.5 MG/0.5ML- PA INITIATED  Insurance Company:    Pharmacy Filling the Rx: [id number] [place] [address]  Filling Pharmacy Phone: [phone]  Filling Pharmacy Fax:    Start Date: 9/12/2022

## 2022-09-13 DIAGNOSIS — E78.49 OTHER HYPERLIPIDEMIA: ICD-10-CM

## 2022-09-13 RX ORDER — SIMVASTATIN 40 MG
TABLET ORAL
Qty: 90 TABLET | Refills: 2 | Status: SHIPPED | OUTPATIENT
Start: 2022-09-13 | End: 2023-04-30

## 2022-09-13 NOTE — TELEPHONE ENCOUNTER
"Last Written Prescription Date:  8/20/21  Last Fill Quantity: 90,  # refills: 3   Last office visit provider:  4/22/22     Requested Prescriptions   Pending Prescriptions Disp Refills     simvastatin (ZOCOR) 40 MG tablet [Pharmacy Med Name: SIMVASTATIN 40 MG TABLET] 90 tablet 3     Sig: TAKE 1 TABLET BY MOUTH EVERY DAY       Statins Protocol Passed - 9/13/2022 12:27 PM        Passed - LDL on file in past 12 months     Recent Labs   Lab Test 04/22/22  0934   LDL 47             Passed - No abnormal creatine kinase in past 12 months     No lab results found.             Passed - Recent (12 mo) or future (30 days) visit within the authorizing provider's specialty     Patient has had an office visit with the authorizing provider or a provider within the authorizing providers department within the previous 12 mos or has a future within next 30 days. See \"Patient Info\" tab in inbasket, or \"Choose Columns\" in Meds & Orders section of the refill encounter.              Passed - Medication is active on med list        Passed - Patient is age 18 or older             Fern Gonzalez RN 09/13/22 12:27 PM  "

## 2022-09-14 RX ORDER — TIRZEPATIDE 2.5 MG/.5ML
2.5 INJECTION, SOLUTION SUBCUTANEOUS WEEKLY
Refills: 1 | OUTPATIENT
Start: 2022-09-14

## 2022-09-14 NOTE — TELEPHONE ENCOUNTER
Outpatient Medication Detail     Disp Refills Start End BRENNEN   Tirzepatide (MOUNJARO) 2.5 MG/0.5ML SOPN 2 mL 1 9/12/2022  --   Sig - Route: Inject 2.5 mg Subcutaneous once a week - Subcutaneous   Sent to pharmacy as: Mounjaro 2.5 MG/0.5ML Subcutaneous Solution Pen-injector (Tirzepatide)   Class: E-Prescribe   Order: [id number]   E-Prescribing Status: Receipt confirmed by pharmacy (9/12/2022 10:27 AM CDT)     Pharmacy    [place] [id number] [place] [address]     Allison Worthington, [facility]

## 2022-10-01 ENCOUNTER — HEALTH MAINTENANCE LETTER (OUTPATIENT)
Age: 52
End: 2022-10-01

## 2022-10-04 NOTE — PROGRESS NOTES
Medication Therapy Management (MTM) Encounter    ASSESSMENT:                            1. Type 2 diabetes mellitus   Most recent A1c 8.6%, above goal <7% per ADA guidelines. Recent change from Ozempic to Mounjaro. Tolerating starting dose so will continue with titration to 5 mg weekly and reassess response in one month.     2. Hypertension  Blood pressure is well controlled and meeting goal of <130/80 mm Hg per ACC/AHA hypertension guidelines.    3. Hyperlipidemia  Appropriately on a moderate intensity statin given age and diabetes diagnosis per ACC/AHA guidelines.     PLAN:                            Increase Mounjaro to 5 mg subcutaneous weekly.     Follow-up: Return in about 1 month (around 11/10/2022).    SUBJECTIVE/OBJECTIVE:                          Bob Neely is a 52 year old male called for a follow up visit. He was referred to me from Andres Gagnon. This is a follow up from 9/12/22.      Reason for visit: diabetes/Mounjaro follow up    Allergies/ADRs: Reviewed in chart  Past Medical History: Reviewed in chart  Tobacco: He reports that he has never smoked. He has never used smokeless tobacco.  Alcohol: yes, rarely    Medication Adherence/Access: no issues reported    1. Type 2 diabetes mellitus  Currently taking metformin 1000 mg twice daily and Mounjaro 2.5 mg subcutaneous weekly. Last month, we stopped Ozempic and replaced with Mounjaro as he was no longer noticing appetite suppression and was gaining back weight. Blood sugars were also still high. Denies side effects. Blood sugars are about the same - usually upper 100-low 200 range. Usually checking fasting in the morning.   Symptoms of low blood sugar? none  Symptoms of high blood sugar? none  Diet/Exercise: Follows good diabetic diet and is physically active  Aspirin: Not taking due to age/low risk  Statin: Yes: simvastatin    ACEi/ARB: Yes: lisinopril    Hemoglobin A1C   Date Value Ref Range Status   04/22/2022 8.6 (H) 0.0 - 5.6 % Final      Comment:     Normal <5.7%   Prediabetes 5.7-6.4%    Diabetes 6.5% or higher     Note: Adopted from ADA consensus guidelines.   08/20/2021 7.9 (H) 0.0 - 5.6 % Final     Comment:     Normal <5.7%   Prediabetes 5.7-6.4%    Diabetes 6.5% or higher     Note: Adopted from ADA consensus guidelines.   01/25/2021 7.0 (H) <=5.6 % Final      Microalbumin Urine mg/dL   Date Value Ref Range Status   08/20/2021 1.68 0.00 - 1.99 mg/dL Final      Creatinine Urine mg/dL   Date Value Ref Range Status   08/20/2021 242 mg/dL Final     LDL Cholesterol Calculated   Date Value Ref Range Status   04/22/2022 47 <=129 mg/dL Final     Creatinine   Date Value Ref Range Status   04/22/2022 1.24 0.70 - 1.30 mg/dL Final     2. Hypertension  Current medications include lisinopril 20 mg daily.  Patient does not self-monitor blood pressure.  Patient reports no current medication side effects.  BP Readings from Last 3 Encounters:   04/22/22 112/71   08/20/21 120/74   01/25/21 110/73     3. Hyperlipidemia  Current therapy includes simvastatin 40 mg every evening.  Patient reports no significant myalgias or other side effects.    The ASCVD Risk score (Davina DK, et al., 2019) failed to calculate for the following reasons:    The valid total cholesterol range is 130 to 320 mg/dL     Recent Labs   Lab Test 04/22/22  0934 08/20/21  0959   CHOL 112 145   HDL 36* 50   LDL 47 68   TRIG 144 134       Vitals:   BP Readings from Last 3 Encounters:   04/22/22 112/71   08/20/21 120/74   01/25/21 110/73      Pulse Readings from Last 3 Encounters:   04/22/22 76   08/20/21 76   01/25/21 71     Wt Readings from Last 3 Encounters:   04/22/22 (!) 334 lb 8 oz (151.7 kg)   08/20/21 (!) 338 lb 9.6 oz (153.6 kg)   01/25/21 (!) 333 lb 14.4 oz (151.5 kg)     ----------------    I spent 15 minutes with this patient today. All changes were made via collaborative practice agreement with Andres Dc MD. A copy of the visit note was provided to the patient's  provider(s).    The patient was given a summary of these recommendations via [employer].     Mayda Dupree, PharmD, BCACP  Medication Management (MTM) Pharmacist  [facility] & [facility]       Telemedicine Visit Details  Type of service:  Telephone visit  Start Time: 9:00 AM  End Time: 9:15 AM  Originating Location (patient location): Home  Distant Location (provider location):  [facility]     Medication Therapy Recommendations  Type 2 diabetes mellitus (H)    Current Medication: Tirzepatide (MOUNJARO) 2.5 MG/0.5ML SOPN (Discontinued)   Rationale: Dose too low - Dosage too low - Effectiveness   Recommendation: Increase Dose   Status: Accepted per CPA

## 2022-10-10 ENCOUNTER — VIRTUAL VISIT (OUTPATIENT)
Dept: PHARMACY | Facility: CLINIC | Age: 52
End: 2022-10-10
Payer: COMMERCIAL

## 2022-10-10 DIAGNOSIS — E78.49 OTHER HYPERLIPIDEMIA: ICD-10-CM

## 2022-10-10 DIAGNOSIS — I10 ESSENTIAL HYPERTENSION: ICD-10-CM

## 2022-10-10 DIAGNOSIS — E11.69 TYPE 2 DIABETES MELLITUS WITH OTHER SPECIFIED COMPLICATION, WITHOUT LONG-TERM CURRENT USE OF INSULIN (H): Primary | ICD-10-CM

## 2022-10-10 PROCEDURE — 99606 MTMS BY PHARM EST 15 MIN: CPT | Performed by: PHARMACIST

## 2022-10-10 RX ORDER — TIRZEPATIDE 5 MG/.5ML
5 INJECTION, SOLUTION SUBCUTANEOUS WEEKLY
Qty: 2 ML | Refills: 3 | Status: SHIPPED | OUTPATIENT
Start: 2022-10-10 | End: 2022-11-07

## 2022-11-04 NOTE — PROGRESS NOTES
Medication Therapy Management (MTM) Encounter    ASSESSMENT:                            1. Type 2 diabetes mellitus   Most recent A1c 8.6%, above goal <7% per ADA guidelines. Recent change from Ozempic to Mounjaro. Not seeing much improvement in blood sugars yet.  Recommend increase to 7.5 mg weekly and reassess response in one month. If still no movement in blood sugars then would recommend addition of SGLT-2 inhibitor. Discussed with patient and he is agreeable with this plan.     2. Hypertension  Blood pressure is well controlled and meeting goal of <130/80 mm Hg per ACC/AHA hypertension guidelines.    3. Hyperlipidemia  Appropriately on a moderate intensity statin given age and diabetes diagnosis per ACC/AHA guidelines.     PLAN:                            Increase Mounjaro to 7.5 mg subcutaneous weekly.     Follow-up: Return in about 1 month (around 12/7/2022).    SUBJECTIVE/OBJECTIVE:                          Bob Neely is a 52 year old male called for a follow up visit. He was referred to me from Andres Gagnon. This is a follow up from 10/10/22.      Reason for visit: diabetes/Mounjaro follow up    Allergies/ADRs: Reviewed in chart  Past Medical History: Reviewed in chart  Tobacco: He reports that he has never smoked. He has never used smokeless tobacco.  Alcohol: yes, rarely    Medication Adherence/Access: no issues reported    1. Type 2 diabetes mellitus  Currently taking metformin 1000 mg twice daily and Mounjaro 5 mg subcutaneous weekly. We have recently stopped Ozempic and replaced with Mounjaro as he was no longer noticing appetite suppression and was gaining back weight. Blood sugars were also still high. Denies side effects. Blood sugars are about the same - usually upper 100-low 200 range. Usually checking fasting in the morning or between meals.   Symptoms of low blood sugar? none  Symptoms of high blood sugar? none  Diet/Exercise: Follows good diabetic diet and is physically  active  Aspirin: Not taking due to age/low risk  Statin: Yes: simvastatin    ACEi/ARB: Yes: lisinopril    Hemoglobin A1C   Date Value Ref Range Status   04/22/2022 8.6 (H) 0.0 - 5.6 % Final     Comment:     Normal <5.7%   Prediabetes 5.7-6.4%    Diabetes 6.5% or higher     Note: Adopted from ADA consensus guidelines.   08/20/2021 7.9 (H) 0.0 - 5.6 % Final     Comment:     Normal <5.7%   Prediabetes 5.7-6.4%    Diabetes 6.5% or higher     Note: Adopted from ADA consensus guidelines.   01/25/2021 7.0 (H) <=5.6 % Final      Microalbumin Urine mg/dL   Date Value Ref Range Status   08/20/2021 1.68 0.00 - 1.99 mg/dL Final      Creatinine Urine mg/dL   Date Value Ref Range Status   08/20/2021 242 mg/dL Final     LDL Cholesterol Calculated   Date Value Ref Range Status   04/22/2022 47 <=129 mg/dL Final     Creatinine   Date Value Ref Range Status   04/22/2022 1.24 0.70 - 1.30 mg/dL Final     2. Hypertension  Current medications include lisinopril 20 mg daily.  Patient does not self-monitor blood pressure.  Patient reports no current medication side effects.  BP Readings from Last 3 Encounters:   04/22/22 112/71   08/20/21 120/74   01/25/21 110/73     3. Hyperlipidemia  Current therapy includes simvastatin 40 mg every evening.  Patient reports no significant myalgias or other side effects.    The ASCVD Risk score (Davina DK, et al., 2019) failed to calculate for the following reasons:    The valid total cholesterol range is 130 to 320 mg/dL     Recent Labs   Lab Test 04/22/22  0934 08/20/21  0959   CHOL 112 145   HDL 36* 50   LDL 47 68   TRIG 144 134       Vitals:   BP Readings from Last 3 Encounters:   04/22/22 112/71   08/20/21 120/74   01/25/21 110/73      Pulse Readings from Last 3 Encounters:   04/22/22 76   08/20/21 76   01/25/21 71     Wt Readings from Last 3 Encounters:   04/22/22 (!) 334 lb 8 oz (151.7 kg)   08/20/21 (!) 338 lb 9.6 oz (153.6 kg)   01/25/21 (!) 333 lb 14.4 oz (151.5 kg)     ----------------    I  spent 15 minutes with this patient today. All changes were made via collaborative practice agreement with Andres Dc MD. A copy of the visit note was provided to the patient's provider(s).    The patient was given a summary of these recommendations via [employer].     Mayda Dupree, PharmD, BCACP  Medication Management (MTM) Pharmacist  [facility]     Telemedicine Visit Details  Type of service:  Telephone visit  Start Time: 9:30 AM  End Time: 9:45 AM  Originating Location (pt. Location): Home      Distant Location (provider location):  On-site  Provider has received verbal consent for a visit from the patient? Yes       Medication Therapy Recommendations  Type 2 diabetes mellitus (H)    Current Medication: Tirzepatide (MOUNJARO) 5 MG/0.5ML SOPN (Discontinued)   Rationale: Dose too low - Dosage too low - Effectiveness   Recommendation: Increase Dose   Status: Accepted per CPA

## 2022-11-07 ENCOUNTER — VIRTUAL VISIT (OUTPATIENT)
Dept: PHARMACY | Facility: CLINIC | Age: 52
End: 2022-11-07
Payer: COMMERCIAL

## 2022-11-07 DIAGNOSIS — E11.69 TYPE 2 DIABETES MELLITUS WITH OTHER SPECIFIED COMPLICATION, WITHOUT LONG-TERM CURRENT USE OF INSULIN (H): Primary | ICD-10-CM

## 2022-11-07 DIAGNOSIS — E78.49 OTHER HYPERLIPIDEMIA: ICD-10-CM

## 2022-11-07 DIAGNOSIS — I10 ESSENTIAL HYPERTENSION: ICD-10-CM

## 2022-11-07 PROCEDURE — 99606 MTMS BY PHARM EST 15 MIN: CPT | Performed by: PHARMACIST

## 2022-11-07 RX ORDER — TIRZEPATIDE 7.5 MG/.5ML
7.5 INJECTION, SOLUTION SUBCUTANEOUS WEEKLY
Qty: 2 ML | Refills: 3 | Status: SHIPPED | OUTPATIENT
Start: 2022-11-07 | End: 2022-12-05

## 2022-11-07 NOTE — PATIENT INSTRUCTIONS
"Recommendations from today's Medication Management (MTM) visit:                                                      Increase Mounjaro to 7.5 mg injected weekly.       To schedule another MTM appointment, please call the MTM scheduling line at [phone] or toll-free at [phone].     My MTM pharmacist's contact information:      Please feel free to contact me with any questions or concerns you have via [employer] or calling the clinic.       Mayda Dupree, PharmD, [facility]  Medication Management (MTM) Pharmacist  [facility]     It was great speaking with you today.  I value your experience and would be very thankful for your time in providing feedback in our clinic survey. In the next few days, you may receive an email or text message from [employer] with a link to a survey related to your  clinical pharmacist.\"     "

## 2022-11-23 ENCOUNTER — TRANSFERRED RECORDS (OUTPATIENT)
Dept: HEALTH INFORMATION MANAGEMENT | Facility: CLINIC | Age: 52
End: 2022-11-23

## 2022-11-23 LAB — RETINOPATHY: NEGATIVE

## 2022-11-30 NOTE — PROGRESS NOTES
Medication Therapy Management (MTM) Encounter    ASSESSMENT:                            1. Type 2 diabetes mellitus   Most recent A1c 8.6%, above goal <7% per ADA guidelines, due for recheck today. Recent change from Ozempic to Mounjaro. Seeing some improvement in blood sugars, but still above goal.  Recommend increase to 10 mg weekly and reassess response in one month. Future consideration is addition of SGLT-2 inhibitor. Discussed with patient and he is agreeable with this plan.     2. Hypertension  Blood pressure is well controlled and meeting goal of <130/80 mm Hg per ACC/AHA hypertension guidelines.    3. Hyperlipidemia  Appropriately on a moderate intensity statin given age and diabetes diagnosis per ACC/AHA guidelines.     PLAN:                            Increase Mounjaro 10 mg subcutaneous weekly.       Follow-up: Return in about 1 month (around 1/5/2023).    SUBJECTIVE/OBJECTIVE:                          Bob Neely is a 52 year old male coming in for a follow up visit. He was referred to me from Andres Gagnon. This is a follow up from 11/7/22.      Reason for visit: diabetes/Mounjaro follow up    Allergies/ADRs: Reviewed in chart  Past Medical History: Reviewed in chart  Tobacco: He reports that he has never smoked. He has never used smokeless tobacco.  Alcohol: yes, rarely    Medication Adherence/Access: no issues reported    1. Type 2 diabetes mellitus  Currently taking metformin 1000 mg twice daily and Mounjaro 7.5 mg subcutaneous weekly. We have recently stopped Ozempic and replaced with Mounjaro as he was no longer noticing appetite suppression and was gaining back weight. Blood sugars were also still high. Did have some diarrhea for a few days, then resolved. Has lost 40 pounds since starting Ozempic. Blood sugars are starting to improve. Usually checking fasting in the morning or between meals.   AM: 190, lowest of 150   Range: 150-230  Symptoms of low blood sugar? none  Symptoms of high  blood sugar? none  Diet/Exercise: Follows good diabetic diet and is physically active; eats low carb with good amount of protein and vegetables  Aspirin: Not taking due to age/low risk  Statin: Yes: simvastatin    ACEi/ARB: Yes: lisinopril    Hemoglobin A1C   Date Value Ref Range Status   04/22/2022 8.6 (H) 0.0 - 5.6 % Final     Comment:     Normal <5.7%   Prediabetes 5.7-6.4%    Diabetes 6.5% or higher     Note: Adopted from ADA consensus guidelines.   08/20/2021 7.9 (H) 0.0 - 5.6 % Final     Comment:     Normal <5.7%   Prediabetes 5.7-6.4%    Diabetes 6.5% or higher     Note: Adopted from ADA consensus guidelines.   01/25/2021 7.0 (H) <=5.6 % Final      Microalbumin Urine mg/dL   Date Value Ref Range Status   08/20/2021 1.68 0.00 - 1.99 mg/dL Final      Creatinine Urine mg/dL   Date Value Ref Range Status   08/20/2021 242 mg/dL Final     LDL Cholesterol Calculated   Date Value Ref Range Status   04/22/2022 47 <=129 mg/dL Final     Creatinine   Date Value Ref Range Status   04/22/2022 1.24 0.70 - 1.30 mg/dL Final     2. Hypertension  Current medications include lisinopril 20 mg daily.  Patient does not self-monitor blood pressure.  Patient reports no current medication side effects.  BP Readings from Last 3 Encounters:   12/05/22 103/70   04/22/22 112/71   08/20/21 120/74     3. Hyperlipidemia  Current therapy includes simvastatin 40 mg every evening.  Patient reports no significant myalgias or other side effects.    The ASCVD Risk score (Davina DK, et al., 2019) failed to calculate for the following reasons:    The valid total cholesterol range is 130 to 320 mg/dL     Recent Labs   Lab Test 04/22/22  0934 08/20/21  0959   CHOL 112 145   HDL 36* 50   LDL 47 68   TRIG 144 134       Vitals:   BP Readings from Last 3 Encounters:   12/05/22 103/70   04/22/22 112/71   08/20/21 120/74      Pulse Readings from Last 3 Encounters:   12/05/22 77   04/22/22 76   08/20/21 76     Wt Readings from Last 3 Encounters:   12/05/22  303 lb (137.4 kg)   04/22/22 (!) 334 lb 8 oz (151.7 kg)   08/20/21 (!) 338 lb 9.6 oz (153.6 kg)     ----------------    I spent 20 minutes with this patient today. All changes were made via collaborative practice agreement with Andres Dc MD. A copy of the visit note was provided to the patient's provider(s).    The patient was given a summary of these recommendations via [employer].     Mayda Dupree, PharmD, BCACP  Medication Management (MTM) Pharmacist  [facility]        Medication Therapy Recommendations  Type 2 diabetes mellitus (H)    Current Medication: Tirzepatide (MOUNJARO) 7.5 MG/0.5ML SOPN (Discontinued)   Rationale: Dose too low - Dosage too low - Effectiveness   Recommendation: Increase Dose   Status: Accepted per CPA

## 2022-12-05 ENCOUNTER — OFFICE VISIT (OUTPATIENT)
Dept: FAMILY MEDICINE | Facility: CLINIC | Age: 52
End: 2022-12-05
Payer: COMMERCIAL

## 2022-12-05 ENCOUNTER — OFFICE VISIT (OUTPATIENT)
Dept: PHARMACY | Facility: CLINIC | Age: 52
End: 2022-12-05
Payer: COMMERCIAL

## 2022-12-05 VITALS
BODY MASS INDEX: 35.06 KG/M2 | WEIGHT: 303 LBS | HEART RATE: 77 BPM | RESPIRATION RATE: 14 BRPM | SYSTOLIC BLOOD PRESSURE: 103 MMHG | DIASTOLIC BLOOD PRESSURE: 70 MMHG | TEMPERATURE: 97.5 F | OXYGEN SATURATION: 98 % | HEIGHT: 78 IN

## 2022-12-05 DIAGNOSIS — E78.49 OTHER HYPERLIPIDEMIA: Primary | ICD-10-CM

## 2022-12-05 DIAGNOSIS — E78.49 OTHER HYPERLIPIDEMIA: ICD-10-CM

## 2022-12-05 DIAGNOSIS — I10 ESSENTIAL HYPERTENSION: ICD-10-CM

## 2022-12-05 DIAGNOSIS — E11.69 TYPE 2 DIABETES MELLITUS WITH OTHER SPECIFIED COMPLICATION, WITHOUT LONG-TERM CURRENT USE OF INSULIN (H): Primary | ICD-10-CM

## 2022-12-05 DIAGNOSIS — E11.69 TYPE 2 DIABETES MELLITUS WITH OTHER SPECIFIED COMPLICATION, WITHOUT LONG-TERM CURRENT USE OF INSULIN (H): ICD-10-CM

## 2022-12-05 DIAGNOSIS — Z12.5 SCREENING FOR PROSTATE CANCER: ICD-10-CM

## 2022-12-05 DIAGNOSIS — E66.01 MORBID OBESITY (H): ICD-10-CM

## 2022-12-05 LAB
ALBUMIN SERPL BCG-MCNC: 4.1 G/DL (ref 3.5–5.2)
ALP SERPL-CCNC: 57 U/L (ref 40–129)
ALT SERPL W P-5'-P-CCNC: 26 U/L (ref 10–50)
ANION GAP SERPL CALCULATED.3IONS-SCNC: 10 MMOL/L (ref 7–15)
AST SERPL W P-5'-P-CCNC: 24 U/L (ref 10–50)
BILIRUB SERPL-MCNC: 0.5 MG/DL
BUN SERPL-MCNC: 10.6 MG/DL (ref 6–20)
CALCIUM SERPL-MCNC: 8.9 MG/DL (ref 8.6–10)
CHLORIDE SERPL-SCNC: 100 MMOL/L (ref 98–107)
CHOLEST SERPL-MCNC: 152 MG/DL
CREAT SERPL-MCNC: 1.14 MG/DL (ref 0.67–1.17)
CREAT UR-MCNC: 520 MG/DL
DEPRECATED HCO3 PLAS-SCNC: 26 MMOL/L (ref 22–29)
GFR SERPL CREATININE-BSD FRML MDRD: 76 ML/MIN/1.73M2
GLUCOSE SERPL-MCNC: 222 MG/DL (ref 70–99)
HBA1C MFR BLD: 9.2 % (ref 0–5.6)
HDLC SERPL-MCNC: 57 MG/DL
LDLC SERPL CALC-MCNC: 67 MG/DL
MICROALBUMIN UR-MCNC: 34.8 MG/L
MICROALBUMIN/CREAT UR: 6.69 MG/G CR (ref 0–17)
NONHDLC SERPL-MCNC: 95 MG/DL
POTASSIUM SERPL-SCNC: 3.9 MMOL/L (ref 3.4–5.3)
PROT SERPL-MCNC: 6.4 G/DL (ref 6.4–8.3)
PSA SERPL-MCNC: 0.45 NG/ML (ref 0–3.5)
SODIUM SERPL-SCNC: 136 MMOL/L (ref 136–145)
TRIGL SERPL-MCNC: 140 MG/DL

## 2022-12-05 PROCEDURE — 83036 HEMOGLOBIN GLYCOSYLATED A1C: CPT | Performed by: FAMILY MEDICINE

## 2022-12-05 PROCEDURE — 36415 COLL VENOUS BLD VENIPUNCTURE: CPT | Performed by: FAMILY MEDICINE

## 2022-12-05 PROCEDURE — 99607 MTMS BY PHARM ADDL 15 MIN: CPT | Performed by: PHARMACIST

## 2022-12-05 PROCEDURE — 80053 COMPREHEN METABOLIC PANEL: CPT | Performed by: FAMILY MEDICINE

## 2022-12-05 PROCEDURE — G0103 PSA SCREENING: HCPCS | Performed by: FAMILY MEDICINE

## 2022-12-05 PROCEDURE — 82043 UR ALBUMIN QUANTITATIVE: CPT | Performed by: FAMILY MEDICINE

## 2022-12-05 PROCEDURE — 99606 MTMS BY PHARM EST 15 MIN: CPT | Performed by: PHARMACIST

## 2022-12-05 PROCEDURE — 99214 OFFICE O/P EST MOD 30 MIN: CPT | Performed by: FAMILY MEDICINE

## 2022-12-05 PROCEDURE — 80061 LIPID PANEL: CPT | Performed by: FAMILY MEDICINE

## 2022-12-05 RX ORDER — TIRZEPATIDE 10 MG/.5ML
10 INJECTION, SOLUTION SUBCUTANEOUS WEEKLY
Qty: 0.5 ML | Refills: 3 | Status: SHIPPED | OUTPATIENT
Start: 2022-12-05 | End: 2023-01-02

## 2022-12-05 RX ORDER — TIRZEPATIDE 10 MG/.5ML
10 INJECTION, SOLUTION SUBCUTANEOUS WEEKLY
Qty: 2 ML | Refills: 3 | Status: SHIPPED | OUTPATIENT
Start: 2022-12-05 | End: 2022-12-05

## 2022-12-05 ASSESSMENT — ANXIETY QUESTIONNAIRES
GAD7 TOTAL SCORE: 7
3. WORRYING TOO MUCH ABOUT DIFFERENT THINGS: SEVERAL DAYS
2. NOT BEING ABLE TO STOP OR CONTROL WORRYING: MORE THAN HALF THE DAYS
GAD7 TOTAL SCORE: 7
4. TROUBLE RELAXING: SEVERAL DAYS
7. FEELING AFRAID AS IF SOMETHING AWFUL MIGHT HAPPEN: NOT AT ALL
5. BEING SO RESTLESS THAT IT IS HARD TO SIT STILL: SEVERAL DAYS
6. BECOMING EASILY ANNOYED OR IRRITABLE: NOT AT ALL
GAD7 TOTAL SCORE: 7
IF YOU CHECKED OFF ANY PROBLEMS ON THIS QUESTIONNAIRE, HOW DIFFICULT HAVE THESE PROBLEMS MADE IT FOR YOU TO DO YOUR WORK, TAKE CARE OF THINGS AT HOME, OR GET ALONG WITH OTHER PEOPLE: NOT DIFFICULT AT ALL
1. FEELING NERVOUS, ANXIOUS, OR ON EDGE: MORE THAN HALF THE DAYS
8. IF YOU CHECKED OFF ANY PROBLEMS, HOW DIFFICULT HAVE THESE MADE IT FOR YOU TO DO YOUR WORK, TAKE CARE OF THINGS AT HOME, OR GET ALONG WITH OTHER PEOPLE?: NOT DIFFICULT AT ALL
7. FEELING AFRAID AS IF SOMETHING AWFUL MIGHT HAPPEN: NOT AT ALL

## 2022-12-05 NOTE — LETTER
December 5, 2022      Biju Neely Jr.  [address] [address]        Dear ,    We are writing to inform you of your test results.  The A1c is higher than we would like at 9.2 but hopefully the increased dose of Mounjoro will help lower  Cholesterol remains well controlled  Liver and kidney tests are normal.  No excess protein in the urine  The PSA or prostate test is normal    He will follow-up with our pharmacist, see me again in 3 to 6 months    Resulted Orders   Comprehensive metabolic panel   Result Value Ref Range    Sodium 136 136 - 145 mmol/L    Potassium 3.9 3.4 - 5.3 mmol/L    Chloride 100 98 - 107 mmol/L    Carbon Dioxide (CO2) 26 22 - 29 mmol/L    Anion Gap 10 7 - 15 mmol/L    Urea Nitrogen 10.6 6.0 - 20.0 mg/dL    Creatinine 1.14 0.67 - 1.17 mg/dL    Calcium 8.9 8.6 - 10.0 mg/dL    Glucose 222 (H) 70 - 99 mg/dL    Alkaline Phosphatase 57 40 - 129 U/L    AST 24 10 - 50 U/L    ALT 26 10 - 50 U/L    Protein Total 6.4 6.4 - 8.3 g/dL    Albumin 4.1 3.5 - 5.2 g/dL    Bilirubin Total 0.5 <=1.2 mg/dL    GFR Estimate 76 >60 mL/min/1.73m2      Comment:      Effective December 21, 2021 eGFRcr in adults is calculated using the 2021 CKD-EPI creatinine equation which includes age and gender (Dinesh lucia al., NEJ, DOI: 10.1056/KLUGmt1760879)   Lipid panel reflex to direct LDL Fasting   Result Value Ref Range    Cholesterol 152 <200 mg/dL    Triglycerides 140 <150 mg/dL    Direct Measure HDL 57 >=40 mg/dL    LDL Cholesterol Calculated 67 <=100 mg/dL    Non HDL Cholesterol 95 <130 mg/dL    Narrative    Cholesterol  Desirable:  <200 mg/dL    Triglycerides  Normal:  Less than 150 mg/dL  Borderline High:  150-199 mg/dL  High:  200-499 mg/dL  Very High:  Greater than or equal to 500 mg/dL    Direct Measure HDL  Female:  Greater than or equal to 50 mg/dL   Male:  Greater than or equal to 40 mg/dL    LDL Cholesterol  Desirable:  <100mg/dL  Above Desirable:  100-129 mg/dL   Borderline High:  130-159  mg/dL   High:  160-189 mg/dL   Very High:  >= 190 mg/dL    Non HDL Cholesterol  Desirable:  130 mg/dL  Above Desirable:  130-159 mg/dL  Borderline High:  160-189 mg/dL  High:  190-219 mg/dL  Very High:  Greater than or equal to 220 mg/dL   Albumin Random Urine Quantitative with Creat Ratio   Result Value Ref Range    Albumin Urine mg/L 34.8 mg/L      Comment:      The reference ranges have not been established in urine albumin. The results should be integrated into the clinical context for interpretation.    Albumin Urine mg/g Cr 6.69 0.00 - 17.00 mg/g Cr      Comment:      Microalbuminuria is defined as an albumin:creatinine ratio of 17 to 299 for males and 25 to 299 for females. A ratio of albumin:creatinine of 300 or higher is indicative of overt proteinuria.  Due to biologic variability, positive results should be confirmed by a second, first-morning random or 24-hour timed urine specimen. If there is discrepancy, a third specimen is recommended. When 2 out of 3 results are in the microalbuminuria range, this is evidence for incipient nephropathy and warrants increased efforts at glucose control, blood pressure control, and institution of therapy with an angiotensin-converting-enzyme (ACE) inhibitor (if the patient can tolerate it).      Creatinine Urine mg/dL 520.0 mg/dL      Comment:      The reference ranges have not been established in urine creatinine. The results should be integrated into the clinical context for interpretation.   Hemoglobin A1c   Result Value Ref Range    Hemoglobin A1C 9.2 (H) 0.0 - 5.6 %      Comment:      Normal <5.7%   Prediabetes 5.7-6.4%    Diabetes 6.5% or higher     Note: Adopted from ADA consensus guidelines.   PROSTATE SPEC ANTIGEN SCREEN   Result Value Ref Range    Prostate Specific Antigen Screen 0.45 0.00 - 3.50 ng/mL    Narrative    This result is obtained using the Roche Elecsys total PSA method on the celsa e801 immunoassay analyzer. Results obtained with different assay  methods or kits cannot be used interchangeably.       If you have any questions or concerns, please call the clinic at the number listed above.       Sincerely,      Andres Dc MD

## 2022-12-05 NOTE — PATIENT INSTRUCTIONS
"Recommendations from today's Medication Management (MTM) visit:                                                      Increase Mounjaro to 10 mg injected weekly.       To schedule another MTM appointment, please call the MTM scheduling line at [phone] or toll-free at [phone].     My MTM pharmacist's contact information:      Please feel free to contact me with any questions or concerns you have via [employer] or calling the clinic.       Mayda Dupree, PharmD, [facility]  Medication Management (MTM) Pharmacist  [facility]     It was great speaking with you today.  I value your experience and would be very thankful for your time in providing feedback in our clinic survey. In the next few days, you may receive an email or text message from [employer] with a link to a survey related to your  clinical pharmacist.\"     "

## 2022-12-05 NOTE — TELEPHONE ENCOUNTER
Routing refill request to provider for review/approval because:  Failed [facility] refill protocol, requesting higher quantity    Last Written Prescription Date:  12/5/22  Last Fill Quantity: 10 mg,  # refills: 3   Last office visit provider:  JANICE Dc     Requested Prescriptions   Pending Prescriptions Disp Refills     MOUNJARO 10 MG/0.5ML SOPN [Pharmacy Med Name: MOUNJARO 10 MG/0.5 ML PEN]  3     Sig: INJECT 10 MG SUBCUTANEOUS ONCE A WEEK       There is no refill protocol information for this order          Marilu Chapa RN 12/05/22 11:27 AMNurse Triage SBAR

## 2022-12-05 NOTE — PROGRESS NOTES
"  Assessment & Plan     (E78.49) Hyperlipidemia  (primary encounter diagnosis)  Comment: Generally controlled on simvastatin  Plan: Comprehensive metabolic panel, Lipid panel         reflex to direct LDL Fasting             (I10) Hypertension  Comment: Currently well controlled on lisinopril  Plan:      (E66.01) Morbid obesity (H)  Comment: Elevated BMI the patient however has been losing weight, prior lap band though this is becoming a bit problematic and he would like it to be removed  Plan:      (Z12.5) Screening for prostate cancer  Comment: Family history of  Plan: PROSTATE SPEC ANTIGEN SCREEN             (E11.69) Type 2 diabetes mellitus  (H)  Comment: Suboptimal control with A1c at 9.2  Plan: Albumin Random Urine Quantitative with Creat         Ratio, Hemoglobin A1c             PLAN:  1.  Laboratory studies as above  2.  Patient is working with our pharmacist and will have his Gallo increased to 10 mg  3.  Referral to our bariatric surgeon for discussion of removal of his Lap-Band  4.  Patient will have follow-up with her pharmacist in 3 months and myself in 6 months.               BMI:   Estimated body mass index is 35.02 kg/m  as calculated from the following:    Height as of this encounter: 1.981 m (6' 6\").    Weight as of this encounter: 137.4 kg (303 lb).           Return in about 6 months (around 6/5/2023) for with me, in person, Routine preventive.    Andres Dc MD  [facility]    Елена Ivy is a 52 year old, presenting for the following health issues:    Patient comes in for follow-up of his comorbidities.  Patient has a history of type 2 diabetes mellitus his A1c has been elevated he is now on [facility] he is working with our pharmacist he is going to have a dose increase because his blood sugars are still running high though they are starting to come down but they have been over 200.    Patient also has a history of hyperlipidemia historically well " controlled on simvastatin.    Patient has a history of morbid obesity he had a lap band procedure done a number of years ago, he asked would like to meet with another bariatric surgeon to discuss removal of this, because he notes that there is a number of food items particularly meat that he has trouble eating, though gets stuck and he also reports that he has had significant changes since the Lap-Band in terms of improved diet and exercise as well as now medication.            Diabetes (6 month diabetic checkup)      HPI           Review of Systems         Objective    There were no vitals taken for this visit.  There is no height or weight on file to calculate BMI.  Physical Exam

## 2022-12-29 NOTE — PROGRESS NOTES
Medication Therapy Management (MTM) Encounter    ASSESSMENT:                            1. Type 2 diabetes mellitus   Most recent A1c 9.2%, above goal <7% per ADA guidelines. Recent change from Ozempic to Mounjaro and working on dose titration. Seeing improvement in blood sugars, but fasting readings still above goal and desires additional weight loss. Recommend increase to 12.5 mg weekly and reassess response in 1-2 months.     2. Hypertension  Blood pressure is well controlled and meeting goal of <130/80 mm Hg per ACC/AHA hypertension guidelines.    3. Hyperlipidemia  Appropriately on a moderate intensity statin given age and diabetes diagnosis per ACC/AHA guidelines.     PLAN:                            Increase Mounjaro 12.5 mg subcutaneous weekly.       Follow-up: Return in about 2 months (around 3/2/2023).    SUBJECTIVE/OBJECTIVE:                          Bob Neely is a 52 year old male called for a follow up visit. He was referred to me from Andres Gagnon. This is a follow up from 12/5/22.      Reason for visit: diabetes/Mounjaro follow up    Allergies/ADRs: Reviewed in chart  Past Medical History: Reviewed in chart  Tobacco: He reports that he has never smoked. He has never used smokeless tobacco.  Alcohol: yes, rarely    Medication Adherence/Access: no issues reported    1. Type 2 diabetes mellitus  Currently taking metformin 1000 mg twice daily and Mounjaro 10 mg subcutaneous weekly. Last year, we stopped Ozempic and replaced with Mounjaro as he was no longer noticing appetite suppression and was gaining back weight. Blood sugars were also still high. Tolerating Mounjaro well. Has very mild loose stool a day or two after his injection, but tolerable. Is starting to lose weight again and have appetite supression. Blood sugars are continuing to improve. Usually checking fasting in the morning or between meals.   AM: 160-170s  Range: 130-140s  Symptoms of low blood sugar? none  Symptoms of high  blood sugar? none  Diet/Exercise: Follows good diabetic diet and is physically active; eats low carb with good amount of protein and vegetables  Aspirin: Not taking due to age/low risk  Statin: Yes: simvastatin    ACEi/ARB: Yes: lisinopril    Hemoglobin A1C   Date Value Ref Range Status   12/05/2022 9.2 (H) 0.0 - 5.6 % Final     Comment:     Normal <5.7%   Prediabetes 5.7-6.4%    Diabetes 6.5% or higher     Note: Adopted from ADA consensus guidelines.   04/22/2022 8.6 (H) 0.0 - 5.6 % Final     Comment:     Normal <5.7%   Prediabetes 5.7-6.4%    Diabetes 6.5% or higher     Note: Adopted from ADA consensus guidelines.   08/20/2021 7.9 (H) 0.0 - 5.6 % Final     Comment:     Normal <5.7%   Prediabetes 5.7-6.4%    Diabetes 6.5% or higher     Note: Adopted from ADA consensus guidelines.      Microalbumin Urine mg/dL   Date Value Ref Range Status   08/20/2021 1.68 0.00 - 1.99 mg/dL Final      Creatinine Urine mg/dL   Date Value Ref Range Status   12/05/2022 520.0 mg/dL Final     Comment:     The reference ranges have not been established in urine creatinine. The results should be integrated into the clinical context for interpretation.   08/20/2021 242 mg/dL Final     LDL Cholesterol Calculated   Date Value Ref Range Status   12/05/2022 67 <=100 mg/dL Final     Creatinine   Date Value Ref Range Status   12/05/2022 1.14 0.67 - 1.17 mg/dL Final     2. Hypertension  Current medications include lisinopril 20 mg daily.  Patient does not self-monitor blood pressure.  Patient reports no current medication side effects.  BP Readings from Last 3 Encounters:   12/05/22 103/70   04/22/22 112/71   08/20/21 120/74     3. Hyperlipidemia  Current therapy includes simvastatin 40 mg every evening.  Patient reports no significant myalgias or other side effects.    The 10-year ASCVD risk score (Davina LYLES, et al., 2019) is: 5%    Values used to calculate the score:      Age: 52 years      Sex: Male      Is Non- : No       Diabetic: Yes      Tobacco smoker: No      Systolic Blood Pressure: 103 mmHg      Is BP treated: Yes      HDL Cholesterol: 57 mg/dL      Total Cholesterol: 152 mg/dL     Recent Labs   Lab Test 12/05/22  0848 04/22/22  0934   CHOL 152 112   HDL 57 36*   LDL 67 47   TRIG 140 144       Vitals:   BP Readings from Last 3 Encounters:   12/05/22 103/70   04/22/22 112/71   08/20/21 120/74      Pulse Readings from Last 3 Encounters:   12/05/22 77   04/22/22 76   08/20/21 76     Wt Readings from Last 3 Encounters:   12/05/22 303 lb (137.4 kg)   04/22/22 (!) 334 lb 8 oz (151.7 kg)   08/20/21 (!) 338 lb 9.6 oz (153.6 kg)     ----------------    I spent 21 minutes with this patient today. All changes were made via collaborative practice agreement with Andres Dc MD. A copy of the visit note was provided to the patient's provider(s).    The patient was given a summary of these recommendations via [employer].     Mayda Dupree, PharmD, BCACP  Medication Management (MTM) Pharmacist  [facility]     Telemedicine Visit Details  Type of service:  Telephone visit  Start Time: 2:00 PM  End Time: 2:21 PM     Medication Therapy Recommendations  Type 2 diabetes mellitus (H)    Current Medication: MOUNJARO 10 MG/0.5ML SOPN (Discontinued)   Rationale: Dose too low - Dosage too low - Effectiveness   Recommendation: Increase Dose   Status: Accepted per CPA

## 2023-01-02 ENCOUNTER — VIRTUAL VISIT (OUTPATIENT)
Dept: PHARMACY | Facility: CLINIC | Age: 53
End: 2023-01-02
Payer: COMMERCIAL

## 2023-01-02 DIAGNOSIS — E11.69 TYPE 2 DIABETES MELLITUS WITH OTHER SPECIFIED COMPLICATION, WITHOUT LONG-TERM CURRENT USE OF INSULIN (H): Primary | ICD-10-CM

## 2023-01-02 DIAGNOSIS — I10 ESSENTIAL HYPERTENSION: ICD-10-CM

## 2023-01-02 DIAGNOSIS — E78.49 OTHER HYPERLIPIDEMIA: ICD-10-CM

## 2023-01-02 PROCEDURE — 99605 MTMS BY PHARM NP 15 MIN: CPT | Performed by: PHARMACIST

## 2023-01-02 PROCEDURE — 99607 MTMS BY PHARM ADDL 15 MIN: CPT | Performed by: PHARMACIST

## 2023-01-02 RX ORDER — TIRZEPATIDE 12.5 MG/.5ML
12.5 INJECTION, SOLUTION SUBCUTANEOUS WEEKLY
Qty: 2 ML | Refills: 3 | Status: SHIPPED | OUTPATIENT
Start: 2023-01-02 | End: 2023-01-31

## 2023-01-02 NOTE — PATIENT INSTRUCTIONS
"Recommendations from today's Medication Management (MTM) visit:                                                      Increase Mounjaro 12.5 mg injected weekly.       To schedule another MTM appointment, please call the MTM scheduling line at [phone] or toll-free at [phone].     My MTM pharmacist's contact information:      Please feel free to contact me with any questions or concerns you have via [employer] or calling the clinic.       Mayda Dupree, PharmD, [facility]  Medication Management (MTM) Pharmacist  [facility]     It was great speaking with you today.  I value your experience and would be very thankful for your time in providing feedback in our clinic survey. In the next few days, you may receive an email or text message from [employer] with a link to a survey related to your  clinical pharmacist.\"     "

## 2023-01-27 DIAGNOSIS — N52.9 MALE ERECTILE DISORDER: ICD-10-CM

## 2023-01-28 RX ORDER — TADALAFIL 20 MG/1
TABLET ORAL
Qty: 8 TABLET | Refills: 5 | Status: SHIPPED | OUTPATIENT
Start: 2023-01-28 | End: 2023-05-01

## 2023-01-29 NOTE — TELEPHONE ENCOUNTER
"Last Written Prescription Date:  8/20/21  Last Fill Quantity: 8,  # refills: 5   Last office visit provider:  12/5/22     Requested Prescriptions   Pending Prescriptions Disp Refills     tadalafil (CIALIS) 20 MG tablet [Pharmacy Med Name: TADALAFIL 20 MG TABLET] 8 tablet 5     Sig: TAKE HALF A TAB TO 1 FULL TAB BY MOUTH DAILY AS NEEDED       Erectile Dysfuction Protocol Passed - 1/27/2023  8:14 PM        Passed - Absence of nitrates on medication list        Passed - Absence of Alpha Blockers on Med list        Passed - Recent (12 mo) or future (30 days) visit within the authorizing provider's specialty     Patient has had an office visit with the authorizing provider or a provider within the authorizing providers department within the previous 12 mos or has a future within next 30 days. See \"Patient Info\" tab in inbasket, or \"Choose Columns\" in Meds & Orders section of the refill encounter.              Passed - Medication is active on med list        Passed - Patient is age 18 or older             Kim Simmons RN 01/28/23 9:50 PM  "

## 2023-01-30 ENCOUNTER — TELEPHONE (OUTPATIENT)
Dept: FAMILY MEDICINE | Facility: CLINIC | Age: 53
End: 2023-01-30
Payer: COMMERCIAL

## 2023-01-30 DIAGNOSIS — E11.69 TYPE 2 DIABETES MELLITUS WITH OTHER SPECIFIED COMPLICATION, WITHOUT LONG-TERM CURRENT USE OF INSULIN (H): ICD-10-CM

## 2023-01-30 NOTE — TELEPHONE ENCOUNTER
Prior Authorization Retail Medication Request    Medication/Dose:   ICD code (if different than what is on RX):  Mounjaro  Previously Tried and Failed:  Ozempic  Rationale:  Ozempic lost efficacy, hemoglobin A1C was not at goal    Insurance Name:  [employer]  Insurance ID:  [id number]       Pharmacy Information (if different than what is on RX)  [facility]  Phone:  [phone]

## 2023-01-31 RX ORDER — TIRZEPATIDE 12.5 MG/.5ML
12.5 INJECTION, SOLUTION SUBCUTANEOUS WEEKLY
Qty: 3 ML | Refills: 3 | Status: SHIPPED | OUTPATIENT
Start: 2023-01-31 | End: 2023-02-28 | Stop reason: DRUGHIGH

## 2023-01-31 NOTE — TELEPHONE ENCOUNTER
Routing refill request to provider for review/approval because:  Drug not on the [facility] refill protocol   Please see pharmacy note regarding insurance issue.    Last Written Prescription Date:  1/2/23  Last Fill Quantity: 2 ml,  # refills: 3   Last office visit provider:  12/5/22     Requested Prescriptions   Pending Prescriptions Disp Refills     MOUNJARO 12.5 MG/0.5ML pen [Pharmacy Med Name: MOUNJARO 12.5 MG/0.5 ML PEN]  3     Sig: INJECT 12.5 MG SUBCUTANEOUS ONCE A WEEK       There is no refill protocol information for this order          Homero Willoughby RN 01/31/23 7:42 AM

## 2023-02-01 NOTE — TELEPHONE ENCOUNTER
PA Initiation    Medication: MOUNJARO 12.5 MG/0.5ML pen  Insurance Company: [employer] - Phone [phone] Fax [phone]  Ph[facility]illing the [id number] [place], [place] - [address] [place] [address] STREET   Filling Pharmacy Phone: [phone]  Filling Pharmacy Fax: [phone]  Start Date: 2/1/2023

## 2023-02-02 NOTE — TELEPHONE ENCOUNTER
PRIOR AUTHORIZATION DENIED    Medication: MOUNJARO 12.5 MG/0.5ML pen    Denial Date: 2/1/2023    Denial Rationale: Patient has to first try/fail 1 additional formulary alternative- Byetta, Bydureon, Rybelsus, Trulicity, Victoza.          Appeal Information: If provider would like to appeal this decision we will need a detailed letter of medical necessity to start the process. Then re-route this request back to the PA pool.

## 2023-02-06 NOTE — TELEPHONE ENCOUNTER
Medication Appeal Initiation    We have initiated an appeal for the requested medication:  Medication: MOUNJARO 12.5 MG/0.5ML pen  Appeal Start Date:  2/6/2023  Insurance Company: [employer] - Phone [phone] Fax [phone]  Comments:       Faxed LMN to [phone]

## 2023-02-06 NOTE — TELEPHONE ENCOUNTER
Appeal letter drafted. Please fax and forward response back to me. Thank you!      Mayda Dupree, PharmD, BCACP  Medication Management (MTM) Pharmacist  [facility]

## 2023-02-07 NOTE — TELEPHONE ENCOUNTER
MEDICATION APPEAL APPROVED    Medication: MOUNJARO 12.5 MG/0.5ML pen  Authorization Effective Date: 1/6/2023  Authorization Expiration Date: 2/6/2026  Approved Dose/Quantity:   Reference #: [id number]   Insurance Company: [employer] - Phone [phone] Fax [phone]  Which Pharmacy is filling the prescription (Not needed for infusion/clinic administered): [facility] [id number] [address]

## 2023-02-26 NOTE — PATIENT INSTRUCTIONS
"Recommendations from today's Medication Management (MTM) visit:                                                      Increase Mounjaro 15 mg injected weekly.       To schedule another MTM appointment, please call the MTM scheduling line at [phone] or toll-free at [phone].     My MTM pharmacist's contact information:      Please feel free to contact me with any questions or concerns you have via [employer] or calling the clinic.       Mayda Dupree, PharmD, [facility]  Medication Management (MTM) Pharmacist  [facility]     It was great speaking with you today.  I value your experience and would be very thankful for your time in providing feedback in our clinic survey. In the next few days, you may receive an email or text message from [employer] with a link to a survey related to your  clinical pharmacist.\"     "

## 2023-02-26 NOTE — PROGRESS NOTES
Medication Therapy Management (MTM) Encounter    ASSESSMENT:                            1. Type 2 diabetes mellitus   Most recent A1c 9.2%, above goal <7% per ADA guidelines, but noted improvement since change from Ozempic to Mounjaro. Successful weight loss, but reported blood sugars, especially fasting, remain above goal. Recommend increasing to maximum dose of of Mounjaro and will recheck A1c in 2 months. Patient agreed with plan.     2. Hypertension  Blood pressure is well controlled and meeting goal of <130/80 mm Hg per ACC/AHA hypertension guidelines.    3. Hyperlipidemia  Appropriately on a moderate intensity statin given age and diabetes diagnosis per ACC/AHA guidelines.     PLAN:                            Increase Mounjaro 15 mg subcutaneous weekly.       Follow-up: Return in about 2 months (around 4/28/2023).    SUBJECTIVE/OBJECTIVE:                          Bob Neely is a 52 year old male called for a follow up visit. He was referred to me from Andres Gagnon. This is a follow up from 1/2/23.      Reason for visit: diabetes/Mounjaro follow up    Allergies/ADRs: Reviewed in chart  Past Medical History: Reviewed in chart  Tobacco: He reports that he has never smoked. He has never used smokeless tobacco.  Alcohol: yes, rarely    Medication Adherence/Access: no issues reported    1. Type 2 diabetes mellitus  Currently taking metformin 1000 mg twice daily and Mounjaro 12.5 mg subcutaneous weekly. Dose of Mounjaro increased two months ago. Last year, we stopped Ozempic and replaced with Mounjaro as he was no longer noticing appetite suppression and was gaining back weight. Blood sugars were also still high. Tolerating Mounjaro well. Has very mild loose stool the day after his injection, but tolerable. He is losing weight, down close to his high school weight, and noticing appetite supression. Blood sugars are continuing to improve, but lately have been stable. Usually checking fasting in the  morning or between meals.   AM: 170-190s  Range: 130-140s up to 180  Symptoms of low blood sugar? none  Symptoms of high blood sugar? none  Diet/Exercise: Follows good diabetic diet and is physically active; eats low carb with good amount of protein and vegetables  Aspirin: Not taking due to age/low risk  Statin: Yes: simvastatin    ACEi/ARB: Yes: lisinopril    Hemoglobin A1C   Date Value Ref Range Status   12/05/2022 9.2 (H) 0.0 - 5.6 % Final     Comment:     Normal <5.7%   Prediabetes 5.7-6.4%    Diabetes 6.5% or higher     Note: Adopted from ADA consensus guidelines.   04/22/2022 8.6 (H) 0.0 - 5.6 % Final     Comment:     Normal <5.7%   Prediabetes 5.7-6.4%    Diabetes 6.5% or higher     Note: Adopted from ADA consensus guidelines.   08/20/2021 7.9 (H) 0.0 - 5.6 % Final     Comment:     Normal <5.7%   Prediabetes 5.7-6.4%    Diabetes 6.5% or higher     Note: Adopted from ADA consensus guidelines.      Microalbumin Urine mg/dL   Date Value Ref Range Status   08/20/2021 1.68 0.00 - 1.99 mg/dL Final      Creatinine Urine mg/dL   Date Value Ref Range Status   12/05/2022 520.0 mg/dL Final     Comment:     The reference ranges have not been established in urine creatinine. The results should be integrated into the clinical context for interpretation.   08/20/2021 242 mg/dL Final     LDL Cholesterol Calculated   Date Value Ref Range Status   12/05/2022 67 <=100 mg/dL Final     Creatinine   Date Value Ref Range Status   12/05/2022 1.14 0.67 - 1.17 mg/dL Final     2. Hypertension  Current medications include lisinopril 20 mg daily.  Patient does not self-monitor blood pressure.  Patient reports no current medication side effects.  BP Readings from Last 3 Encounters:   12/05/22 103/70   04/22/22 112/71   08/20/21 120/74     3. Hyperlipidemia  Current therapy includes simvastatin 40 mg every evening.  Patient reports no significant myalgias or other side effects.    The 10-year ASCVD risk score (Davina DK, et al., 2019) is:  5%    Values used to calculate the score:      Age: 52 years      Sex: Male      Is Non- : No      Diabetic: Yes      Tobacco smoker: No      Systolic Blood Pressure: 103 mmHg      Is BP treated: Yes      HDL Cholesterol: 57 mg/dL      Total Cholesterol: 152 mg/dL     Recent Labs   Lab Test 12/05/22  0848 04/22/22  0934   CHOL 152 112   HDL 57 36*   LDL 67 47   TRIG 140 144       Vitals:   BP Readings from Last 3 Encounters:   12/05/22 103/70   04/22/22 112/71   08/20/21 120/74      Pulse Readings from Last 3 Encounters:   12/05/22 77   04/22/22 76   08/20/21 76     Wt Readings from Last 3 Encounters:   12/05/22 303 lb (137.4 kg)   04/22/22 (!) 334 lb 8 oz (151.7 kg)   08/20/21 (!) 338 lb 9.6 oz (153.6 kg)     ----------------    I spent 15 minutes with this patient today. All changes were made via collaborative practice agreement with Andres Dc MD. A copy of the visit note was provided to the patient's provider(s).    The patient was given a summary of these recommendations via [employer].     Mayda Dupree, PharmD, BCACP  Medication Management (MTM) Pharmacist  [facility]     Telemedicine Visit Details  Type of service:  Telephone visit  Start Time: 11:00 AM  End Time: 11:15 AM     Medication Therapy Recommendations  Type 2 diabetes mellitus (H)    Current Medication: MOUNJARO 12.5 MG/0.5ML pen (Discontinued)   Rationale: Dose too low - Dosage too low - Effectiveness   Recommendation: Increase Dose   Status: Accepted per CPA

## 2023-02-28 ENCOUNTER — VIRTUAL VISIT (OUTPATIENT)
Dept: PHARMACY | Facility: CLINIC | Age: 53
End: 2023-02-28
Payer: COMMERCIAL

## 2023-02-28 DIAGNOSIS — E11.69 TYPE 2 DIABETES MELLITUS WITH OTHER SPECIFIED COMPLICATION, WITHOUT LONG-TERM CURRENT USE OF INSULIN (H): Primary | ICD-10-CM

## 2023-02-28 DIAGNOSIS — E78.49 OTHER HYPERLIPIDEMIA: ICD-10-CM

## 2023-02-28 DIAGNOSIS — I10 ESSENTIAL HYPERTENSION: ICD-10-CM

## 2023-02-28 PROCEDURE — 99606 MTMS BY PHARM EST 15 MIN: CPT | Performed by: PHARMACIST

## 2023-02-28 RX ORDER — TIRZEPATIDE 15 MG/.5ML
15 INJECTION, SOLUTION SUBCUTANEOUS
Qty: 2 ML | Refills: 5 | Status: SHIPPED | OUTPATIENT
Start: 2023-02-28 | End: 2023-03-01

## 2023-04-12 ENCOUNTER — OFFICE VISIT (OUTPATIENT)
Dept: SURGERY | Facility: CLINIC | Age: 53
End: 2023-04-12
Payer: COMMERCIAL

## 2023-04-12 VITALS
BODY MASS INDEX: 32.65 KG/M2 | HEIGHT: 78 IN | WEIGHT: 282.2 LBS | DIASTOLIC BLOOD PRESSURE: 80 MMHG | SYSTOLIC BLOOD PRESSURE: 134 MMHG

## 2023-04-12 DIAGNOSIS — Z98.84 HISTORY OF LAPAROSCOPIC ADJUSTABLE GASTRIC BANDING: Primary | ICD-10-CM

## 2023-04-12 DIAGNOSIS — R63.8 DIFFICULTY EATING: ICD-10-CM

## 2023-04-12 PROCEDURE — 99214 OFFICE O/P EST MOD 30 MIN: CPT | Performed by: FAMILY MEDICINE

## 2023-04-12 NOTE — LETTER
4/12/2023         RE: Bob Neely JrDemario  [address]  [facility] [address]        Dear Colleague,    Thank you for referring your patient, Bob Neely Jr., to the [facility] [place]. Please see a copy of my visit note below.    Bariatric Follow Up Visit with a History of Previous Bariatric Surgery     Date of visit: 4/12/2023  Physician: Karuna Mcelroy MD, MD  Primary Care Provider:  Andres Dc  Bob Neely JrDemario   53 year old  male    Date of Surgery: 12/28/2005  Initial Weight: 444  Initial BMI: 51.3  Today's Weight:   Wt Readings from Last 1 Encounters:   04/12/23 128 kg (282 lb 3.2 oz)     Body mass index is 32.61 kg/m .      Assessment and Plan     Assessment: Bob is a 53 year old year old male who is 17 yrs s/p  Lap Band with Dr. Cote  Bob Neely Jr. feels as if he has achieved the goals he hoped to accomplish through bariatric surgery and weight loss.    Encounter Diagnoses   Name Primary?     History of laparoscopic adjustable gastric banding Yes     Difficulty eating          Current Outpatient Medications:      blood glucose (ACCU-CHEK GUIDE) test strip, Use to test blood sugar 2 times daily or as directed., Disp: 200 strip, Rfl: 3     blood glucose monitoring (ACCU-CHEK FASTCLIX) lancets, Use to test blood sugar 2 times daily or as directed., Disp: 200 each, Rfl: 3     lisinopril (ZESTRIL) 20 MG tablet, TAKE 1 TABLET BY MOUTH EVERY DAY, Disp: 90 tablet, Rfl: 2     metFORMIN (GLUCOPHAGE) 1000 MG tablet, [METFORMIN (GLUCOPHAGE) 1000 MG TABLET] TAKE 1 TABLET BY MOUTH TWICE A DAY WITH FOOD, Disp: 180 tablet, Rfl: 3     simvastatin (ZOCOR) 40 MG tablet, TAKE 1 TABLET BY MOUTH EVERY DAY, Disp: 90 tablet, Rfl: 2     tadalafil (CIALIS) 20 MG tablet, TAKE HALF A TAB TO 1 FULL TAB BY MOUTH DAILY AS NEEDED, Disp: 8 tablet, Rfl: 5     tirzepatide (MOUNJARO) 15 MG/0.5ML pen, Inject 15 mg Subcutaneous every 7 days, Disp: 2  mL, Rfl: 5     Plan: Consult Dr. Alan for removal of lap band. RD visit.       Bariatric Surgery Review     Interim History/LifeChanges: Lap band has caused maladaptive eating. Taking Mounjaro after Ozempic shortage. Was up to 330# post COVID down to 310#. Maintains a 162# weight loss.  Cannot eat in the morning, cannot eat proteins. Has GERD on and off. Takes PPI for that. Has worked through emotional eating, stress eating etc... and would like to establish a healthy pattern, consuming the healthy foods he cannot eat with his lap band in place.     Patient Concerns: would like lap band out  Appetite (1-10): suppressed with Mounjaro  GERD: yes    Medication changes: Mounjaro is newer.     Vitamin Intake:   B-12   NA   MVI One a day   Vitamin D  NA   Calcium   NA     Other                LABS:    Nausea no  Vomiting rare  Constipation no  Diarrhea no  Rashes none  Hair Loss no  Calf tenderness no  Breathing difficulty no  Reactive Hypoglycemia no  Light Headedness no   Moods euthymc    12 point ROS as above and otherwise negative      Habits:  Alcohol: 0-1  Tobacco: no  Caffeine no  NSAIDS no  Exercise Routine: plays basketball, works out. 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 Was a college . Working FT for the [employer]. His children are grown and were also college athletes. Building a home in [place]. Looking forward to grandchildren.    Diet-eating well            Exercise- Walking, some running, gym, basketball, swimming     Social Determinants of Health     Financial Resource Strain: Not on file   Food Insecurity: Not on file   Transportation Needs: Not on file   Physical Activity: Not on file   Stress: Not on file   Social Connections: Not on file   Intimate Partner Violence: Not on file   Housing Stability: Not on file       Past Medical History     Past Medical History:   Diagnosis Date     Anxiety     Previously on Zoloft     Problem List     Patient Active Problem List   Diagnosis     Male Erectile Disorder     Esophageal reflux     Type 2 diabetes mellitus (H)     Vitamin D Deficiency     Hyperlipidemia     Morbid obesity (H)     Hypertension     Psoriasis     Herpes zoster (shingles)     Medications       Current Outpatient Medications:      blood glucose (ACCU-CHEK GUIDE) test strip, Use to test blood sugar 2 times daily or as directed., Disp: 200 strip, Rfl: 3     blood glucose monitoring (ACCU-CHEK FASTCLIX) lancets, Use to test blood sugar 2 times daily or as directed., Disp: 200 each, Rfl: 3     lisinopril (ZESTRIL) 20 MG tablet, TAKE 1 TABLET BY MOUTH EVERY DAY, Disp: 90 tablet, Rfl: 2     metFORMIN (GLUCOPHAGE) 1000 MG tablet, [METFORMIN (GLUCOPHAGE) 1000 MG TABLET] TAKE 1 TABLET BY MOUTH TWICE A DAY WITH FOOD, Disp: 180 tablet, Rfl: 3     simvastatin (ZOCOR) 40 MG tablet, TAKE 1 TABLET BY MOUTH EVERY DAY, Disp: 90 tablet, Rfl: 2     tadalafil (CIALIS) 20 MG tablet, TAKE HALF A TAB TO 1 FULL TAB BY MOUTH DAILY AS NEEDED, Disp: 8 tablet, Rfl: 5     tirzepatide (MOUNJARO) 15 MG/0.5ML pen, Inject 15 mg Subcutaneous every 7  "days, Disp: 2 mL, Rfl: 5   Surgical History     Past Surgical History  He has a past surgical history that includes appendectomy and Laparoscopic Gastric Banding.    Objective-Exam     Constitutional:  /80 (BP Location: Right arm, Patient Position: Sitting, Cuff Size: Adult Small)   Ht 1.981 m (6' 6\")   Wt 128 kg (282 lb 3.2 oz)   BMI 32.61 kg/m      General:  Pleasant and in no acute distress   Eyes:  EOMI  ENT:  Airway 1+  Moist mucous membranes  Neck:  Supple, No LAD, No thyromegaly, No carotid bruits appreciated  Respiratory: Normal respiratory effort, no cough, wheezes or crackles  CV:  Regular rate and Rhythm,no murmurs, pulses 2+, no calf tenderness, no LE edema  Gastrointestinal: Abdomen NT/ND, BS+  Musculoskeletal: muscle mass WNL  Skin: color tan hair full, incisions nicely healed  Neurological: No tremor, normal gait  Psychiatric: alert and oriented X3, mood and affect normal    Counseling     We reviewed the important post op bariatric recommendations:  -eating 3 meals daily  -eating protein first, getting >60gm protein daily  -eating slowly, chewing food well  -avoiding/limiting calorie containing beverages  -drinking water 15-30 minutes before or after meals  -choosing wheat, not white with breads, crackers, pastas, mary, bagels, tortillas, rice  -limiting restaurant or cafeteria eating to twice a week or less    We discussed the importance of restorative sleep and stress management in maintaining a healthy weight.  We discussed the National Weight Control Registry healthy weight maintenance strategies and ways to optimize metabolism.  We discussed the importance of physical activity including cardiovascular and strength training in maintaining a healthier weight.    We discussed the importance of life-long vitamin supplementation and life-long  follow-up.    Bob was reminded that, to avoid marginal ulcers he should avoid tobacco at all, alcohol in excess, caffeine in excess, and NSAIDS " (unless indicated for cardioprotection or othewise and opposed by a PPI).    Karuna Mcelroy MD, MD, [facility].  4/12/2023  2:12 PM      No images are attached to the encounter.  Total time spent on the date of this encounter doing: chart review, review of test results, patient visit, physical exam, education, counseling, developing plan of care, and documenting = 35 minutes.      Again, thank you for allowing me to participate in the care of your patient.        Sincerely,        Karuna Mcelroy MD

## 2023-04-12 NOTE — PROGRESS NOTES
Bariatric Follow Up Visit with a History of Previous Bariatric Surgery     Date of visit: 4/12/2023  Physician: Karuna Mcelroy MD, MD  Primary Care Provider:  Andres Dc SY Chin Marks   53 year old  male    Date of Surgery: 12/28/2005  Initial Weight: 444  Initial BMI: 51.3  Today's Weight:   Wt Readings from Last 1 Encounters:   04/12/23 128 kg (282 lb 3.2 oz)     Body mass index is 32.61 kg/m .      Assessment and Plan     Assessment: Bob is a 53 year old year old male who is 17 yrs s/p  Lap Band with Dr. Cote  Bob Neely Jr. feels as if he has achieved the goals he hoped to accomplish through bariatric surgery and weight loss.    Encounter Diagnoses   Name Primary?     History of laparoscopic adjustable gastric banding Yes     Difficulty eating          Current Outpatient Medications:      blood glucose (ACCU-CHEK GUIDE) test strip, Use to test blood sugar 2 times daily or as directed., Disp: 200 strip, Rfl: 3     blood glucose monitoring (ACCU-CHEK FASTCLIX) lancets, Use to test blood sugar 2 times daily or as directed., Disp: 200 each, Rfl: 3     lisinopril (ZESTRIL) 20 MG tablet, TAKE 1 TABLET BY MOUTH EVERY DAY, Disp: 90 tablet, Rfl: 2     metFORMIN (GLUCOPHAGE) 1000 MG tablet, [METFORMIN (GLUCOPHAGE) 1000 MG TABLET] TAKE 1 TABLET BY MOUTH TWICE A DAY WITH FOOD, Disp: 180 tablet, Rfl: 3     simvastatin (ZOCOR) 40 MG tablet, TAKE 1 TABLET BY MOUTH EVERY DAY, Disp: 90 tablet, Rfl: 2     tadalafil (CIALIS) 20 MG tablet, TAKE HALF A TAB TO 1 FULL TAB BY MOUTH DAILY AS NEEDED, Disp: 8 tablet, Rfl: 5     tirzepatide (MOUNJARO) 15 MG/0.5ML pen, Inject 15 mg Subcutaneous every 7 days, Disp: 2 mL, Rfl: 5     Plan: Consult Dr. Alan for removal of lap band. RD visit.       Bariatric Surgery Review     Interim History/LifeChanges: Lap band has caused maladaptive eating. Taking Mounjaro after Ozempic shortage. Was up to 330# post COVID down to 310#. Maintains a 162# weight loss.   Cannot eat in the morning, cannot eat proteins. Has GERD on and off. Takes PPI for that. Has worked through emotional eating, stress eating etc... and would like to establish a healthy pattern, consuming the healthy foods he cannot eat with his lap band in place.     Patient Concerns: would like lap band out  Appetite (1-10): suppressed with Mounjaro  GERD: yes    Medication changes: Mounjaro is newer.     Vitamin Intake:   B-12   NA   MVI One a day   Vitamin D  NA   Calcium   NA     Other                LABS:    Nausea no  Vomiting rare  Constipation no  Diarrhea no  Rashes none  Hair Loss no  Calf tenderness no  Breathing difficulty no  Reactive Hypoglycemia no  Light Headedness no   Moods euthymc    12 point ROS as above and otherwise negative      Habits:  Alcohol: 0-1  Tobacco: no  Caffeine no  NSAIDS no  Exercise Routine: plays basketball, works out. 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 Was a college . Working FT for the [employer]. His children are grown and were also college athletes. Building a home in [place]. Looking  forward to grandchildren.    Diet-eating well            Exercise- Walking, some running, gym, basketball, swimming     Social Determinants of Health     Financial Resource Strain: Not on file   Food Insecurity: Not on file   Transportation Needs: Not on file   Physical Activity: Not on file   Stress: Not on file   Social Connections: Not on file   Intimate Partner Violence: Not on file   Housing Stability: Not on file       Past Medical History     Past Medical History:   Diagnosis Date     Anxiety     Previously on Zoloft     Problem List     Patient Active Problem List   Diagnosis     Male Erectile Disorder     Esophageal reflux     Type 2 diabetes mellitus (H)     Vitamin D Deficiency     Hyperlipidemia     Morbid obesity (H)     Hypertension     Psoriasis     Herpes zoster (shingles)     Medications       Current Outpatient Medications:      blood glucose (ACCU-CHEK GUIDE) test strip, Use to test blood sugar 2 times daily or as directed., Disp: 200 strip, Rfl: 3     blood glucose monitoring (ACCU-CHEK FASTCLIX) lancets, Use to test blood sugar 2 times daily or as directed., Disp: 200 each, Rfl: 3     lisinopril (ZESTRIL) 20 MG tablet, TAKE 1 TABLET BY MOUTH EVERY DAY, Disp: 90 tablet, Rfl: 2     metFORMIN (GLUCOPHAGE) 1000 MG tablet, [METFORMIN (GLUCOPHAGE) 1000 MG TABLET] TAKE 1 TABLET BY MOUTH TWICE A DAY WITH FOOD, Disp: 180 tablet, Rfl: 3     simvastatin (ZOCOR) 40 MG tablet, TAKE 1 TABLET BY MOUTH EVERY DAY, Disp: 90 tablet, Rfl: 2     tadalafil (CIALIS) 20 MG tablet, TAKE HALF A TAB TO 1 FULL TAB BY MOUTH DAILY AS NEEDED, Disp: 8 tablet, Rfl: 5     tirzepatide (MOUNJARO) 15 MG/0.5ML pen, Inject 15 mg Subcutaneous every 7 days, Disp: 2 mL, Rfl: 5   Surgical History     Past Surgical History  He has a past surgical history that includes appendectomy and Laparoscopic Gastric Banding.    Objective-Exam     Constitutional:  /80 (BP Location: Right arm, Patient Position: Sitting, Cuff Size: Adult Small)   " Ht 1.981 m (6' 6\")   Wt 128 kg (282 lb 3.2 oz)   BMI 32.61 kg/m      General:  Pleasant and in no acute distress   Eyes:  EOMI  ENT:  Airway 1+  Moist mucous membranes  Neck:  Supple, No LAD, No thyromegaly, No carotid bruits appreciated  Respiratory: Normal respiratory effort, no cough, wheezes or crackles  CV:  Regular rate and Rhythm,no murmurs, pulses 2+, no calf tenderness, no LE edema  Gastrointestinal: Abdomen NT/ND, BS+  Musculoskeletal: muscle mass WNL  Skin: color tan hair full, incisions nicely healed  Neurological: No tremor, normal gait  Psychiatric: alert and oriented X3, mood and affect normal    Counseling     We reviewed the important post op bariatric recommendations:  -eating 3 meals daily  -eating protein first, getting >60gm protein daily  -eating slowly, chewing food well  -avoiding/limiting calorie containing beverages  -drinking water 15-30 minutes before or after meals  -choosing wheat, not white with breads, crackers, pastas, mary, bagels, tortillas, rice  -limiting restaurant or cafeteria eating to twice a week or less    We discussed the importance of restorative sleep and stress management in maintaining a healthy weight.  We discussed the National Weight Control Registry healthy weight maintenance strategies and ways to optimize metabolism.  We discussed the importance of physical activity including cardiovascular and strength training in maintaining a healthier weight.    We discussed the importance of life-long vitamin supplementation and life-long  follow-up.    Bob was reminded that, to avoid marginal ulcers he should avoid tobacco at all, alcohol in excess, caffeine in excess, and NSAIDS (unless indicated for cardioprotection or othewise and opposed by a PPI).    Karuna Mcelroy MD, MD, FAAFP  [facility].  4/12/2023  2:12 PM      No images are attached to the encounter.  Total time spent on the date of this encounter doing: chart review, " review of test results, patient visit, physical exam, education, counseling, developing plan of care, and documenting = 35 minutes.

## 2023-04-20 ENCOUNTER — OFFICE VISIT (OUTPATIENT)
Dept: SURGERY | Facility: CLINIC | Age: 53
End: 2023-04-20
Payer: COMMERCIAL

## 2023-04-20 VITALS — WEIGHT: 284 LBS | BODY MASS INDEX: 32.82 KG/M2

## 2023-04-20 DIAGNOSIS — Z98.84 HISTORY OF LAPAROSCOPIC ADJUSTABLE GASTRIC BANDING: Primary | ICD-10-CM

## 2023-04-20 PROCEDURE — 99203 OFFICE O/P NEW LOW 30 MIN: CPT | Performed by: SURGERY

## 2023-04-20 RX ORDER — CEFAZOLIN SODIUM/WATER 3 G/30 ML
3 SYRINGE (ML) INTRAVENOUS
Status: CANCELLED | OUTPATIENT
Start: 2023-07-05

## 2023-04-20 RX ORDER — ACETAMINOPHEN 325 MG/1
975 TABLET ORAL ONCE
Status: CANCELLED | OUTPATIENT
Start: 2023-07-05 | End: 2023-04-20

## 2023-04-20 RX ORDER — CEFAZOLIN SODIUM/WATER 3 G/30 ML
3 SYRINGE (ML) INTRAVENOUS SEE ADMIN INSTRUCTIONS
Status: CANCELLED | OUTPATIENT
Start: 2023-07-05

## 2023-04-20 NOTE — LETTER
4/20/2023         RE: Bob Neely Jr.  [address]  [facility] [address]        Dear Colleague,    Thank you for referring your patient, Bob Neely Jr., to the [facility] [place]. Please see a copy of my visit note below.    HPI: Bob Neely Jr. is a 53 year old male here today for Discussion regarding removal of his adjustable gastric band.  He had this placed in 2005, at which point time he weighed over 450 pounds.  He lost a significant amount of weight and has maintained this over the long-term.  Unfortunately this has been accompanied by increasing difficulties with eating.  He notes that he has intermittent gastroesophageal reflux, and is having difficulty getting an adequate protein due to eating intolerance attributed to the adjustable band.    He notes prior to his band placement 17 years ago he had a number of maladaptive eating habits including emotional and stress eating.  He states that these issues are no longer present and he has healthier eating habits when not impaired by the adjustable gastric band.  He is also on Mounjaro which is helping with appetite suppression.  He would like to have the adjustable band removed without plan for revision to another bariatric procedure.      Allergies:Patient has no known allergies.    Past Medical History:   Diagnosis Date     Anxiety     Previously on Zoloft       Past Surgical History:   Procedure Laterality Date     APPENDECTOMY       LAPAROSCOPIC GASTRIC BANDING      Laparosc Restrictive Proc Adjustable Gastric Band Placement; LAP BAND; was over 440       CURRENT MEDS:  Prior to Admission medications    Medication Sig Start Date End Date Taking? Authorizing Provider   blood glucose (ACCU-CHEK GUIDE) test strip Use to test blood sugar 2 times daily or as directed. 5/11/22  Yes Andres Dc MD   blood glucose monitoring (ACCU-CHEK FASTCLIX) lancets Use to test blood sugar 2 times daily  or as directed. 5/11/22  Yes Andres Dc MD   lisinopril (ZESTRIL) 20 MG tablet TAKE 1 TABLET BY MOUTH EVERY DAY 8/13/22  Yes Andres Dc MD   metFORMIN (GLUCOPHAGE) 1000 MG tablet [METFORMIN (GLUCOPHAGE) 1000 MG TABLET] TAKE 1 TABLET BY MOUTH TWICE A DAY WITH FOOD 8/15/22  Yes Andres Dc MD   simvastatin (ZOCOR) 40 MG tablet TAKE 1 TABLET BY MOUTH EVERY DAY 9/13/22  Yes Andres Dc MD   tadalafil (CIALIS) 20 MG tablet TAKE HALF A TAB TO 1 FULL TAB BY MOUTH DAILY AS NEEDED 1/28/23  Yes Andres Dc MD   tirzepatide (MOUNJARO) 15 MG/0.5ML pen Inject 15 mg Subcutaneous every 7 days 3/1/23  Yes Andres Dc MD         Social Connections: Not on file       Family History   Problem Relation Age of Onset     Hyperlipidemia Father      Prostate Cancer Father 52.00     Psoriasis Father         Severe-with arthitis disabled      Coronary Artery Disease Maternal Grandmother      Prostate Cancer Maternal Grandfather      Diabetes Type 2  Paternal Grandmother      Aneurysm Paternal Grandfather         Cardiovascular     Diabetes Type 2  Paternal Uncle      Diabetes Type 2  Paternal Uncle      Diabetes Type 2  Paternal Uncle      Diabetes Type 2  Brother      Hypertension Brother         reports that he has never smoked. He has never used smokeless tobacco. He reports that he does not currently use alcohol. He reports that he does not use drugs.    History   Smoking Status     Never   Smokeless Tobacco     Never       Review of Systems -  A complete ROS was reviewed and except for what is listed in the HPI above, all others are negative    Wt 128.8 kg (284 lb)   BMI 32.82 kg/m      Wt Readings from Last 4 Encounters:   04/20/23 128.8 kg (284 lb)   04/12/23 128 kg (282 lb 3.2 oz)   12/05/22 137.4 kg (303 lb)   04/22/22 (!) 151.7 kg (334 lb 8 oz)        Body mass index is 32.82 kg/m .    EXAM:  GENERAL: This is a well-developed 53 year old male who appears his stated age  HEAD & NECK: Grossly  normal.  No visible goiter or scars  CARDIAC: Regular rate and rhythm  CHEST/LUNG: Normal respiratory effort  ABDOMEN: Obese.  No visible hernias or masses appreciated.  External port located in the left upper quadrant and easily palpable.  LYMPHATIC:  No significant adenopathy visualized.    EXTREMITIES: Grossly normal.  No evidence of chronic venous stasis.    NEUROLOGIC: Focally intact  INTEGUMENT: No open lesions or ulcers appreciated visually  PSYCHIATRIC: Normal affect. He has a good grasp on the nature of his obesity and the treatment options.    LABS:      Assessment/Plan: 53 year old for whom the adjustable gastric band is no longer providing sufficient benefit to justify its continued existence.  He is aware of the potential for weight regain following band removal and still has no interest and conversion to another bariatric procedure such as a gastric bypass.  We discussed the risks and benefits of surgery including risk of bleeding or iatrogenic injury to the liver or stomach.  We discussed his perioperative course and postoperative activity restrictions.  We will plan to proceed with a laparoscopic removal of adjustable gastric band at his convenience.      Hernesto Alan MD ,MD  [facility] Department of Surgery      Again, thank you for allowing me to participate in the care of your patient.        Sincerely,        Hernesto Alan MD

## 2023-04-20 NOTE — PROGRESS NOTES
HPI: Bob Neely Jr. is a 53 year old male here today for Discussion regarding removal of his adjustable gastric band.  He had this placed in 2005, at which point time he weighed over 450 pounds.  He lost a significant amount of weight and has maintained this over the long-term.  Unfortunately this has been accompanied by increasing difficulties with eating.  He notes that he has intermittent gastroesophageal reflux, and is having difficulty getting an adequate protein due to eating intolerance attributed to the adjustable band.    He notes prior to his band placement 17 years ago he had a number of maladaptive eating habits including emotional and stress eating.  He states that these issues are no longer present and he has healthier eating habits when not impaired by the adjustable gastric band.  He is also on Mounjaro which is helping with appetite suppression.  He would like to have the adjustable band removed without plan for revision to another bariatric procedure.      Allergies:Patient has no known allergies.    Past Medical History:   Diagnosis Date     Anxiety     Previously on Zoloft       Past Surgical History:   Procedure Laterality Date     APPENDECTOMY       LAPAROSCOPIC GASTRIC BANDING      Laparosc Restrictive Proc Adjustable Gastric Band Placement; LAP BAND; was over 440       CURRENT MEDS:  Prior to Admission medications    Medication Sig Start Date End Date Taking? Authorizing Provider   blood glucose (ACCU-CHEK GUIDE) test strip Use to test blood sugar 2 times daily or as directed. 5/11/22  Yes Andres Dc MD   blood glucose monitoring (ACCU-CHEK FASTCLIX) lancets Use to test blood sugar 2 times daily or as directed. 5/11/22  Yes Andres Dc MD   lisinopril (ZESTRIL) 20 MG tablet TAKE 1 TABLET BY MOUTH EVERY DAY 8/13/22  Yes Andres Dc MD   metFORMIN (GLUCOPHAGE) 1000 MG tablet [METFORMIN (GLUCOPHAGE) 1000 MG TABLET] TAKE 1 TABLET BY MOUTH TWICE A DAY WITH FOOD 8/15/22   Yes Andres Dc MD   simvastatin (ZOCOR) 40 MG tablet TAKE 1 TABLET BY MOUTH EVERY DAY 9/13/22  Yes Andres Dc MD   tadalafil (CIALIS) 20 MG tablet TAKE HALF A TAB TO 1 FULL TAB BY MOUTH DAILY AS NEEDED 1/28/23  Yes Andres Dc MD   tirzepatide (MOUNJARO) 15 MG/0.5ML pen Inject 15 mg Subcutaneous every 7 days 3/1/23  Yes Andres Dc MD         Social Connections: Not on file       Family History   Problem Relation Age of Onset     Hyperlipidemia Father      Prostate Cancer Father 52.00     Psoriasis Father         Severe-with arthitis disabled      Coronary Artery Disease Maternal Grandmother      Prostate Cancer Maternal Grandfather      Diabetes Type 2  Paternal Grandmother      Aneurysm Paternal Grandfather         Cardiovascular     Diabetes Type 2  Paternal Uncle      Diabetes Type 2  Paternal Uncle      Diabetes Type 2  Paternal Uncle      Diabetes Type 2  Brother      Hypertension Brother         reports that he has never smoked. He has never used smokeless tobacco. He reports that he does not currently use alcohol. He reports that he does not use drugs.    History   Smoking Status     Never   Smokeless Tobacco     Never       Review of Systems -  A complete ROS was reviewed and except for what is listed in the HPI above, all others are negative    Wt 128.8 kg (284 lb)   BMI 32.82 kg/m      Wt Readings from Last 4 Encounters:   04/20/23 128.8 kg (284 lb)   04/12/23 128 kg (282 lb 3.2 oz)   12/05/22 137.4 kg (303 lb)   04/22/22 (!) 151.7 kg (334 lb 8 oz)        Body mass index is 32.82 kg/m .    EXAM:  GENERAL: This is a well-developed 53 year old male who appears his stated age  HEAD & NECK: Grossly normal.  No visible goiter or scars  CARDIAC: Regular rate and rhythm  CHEST/LUNG: Normal respiratory effort  ABDOMEN: Obese.  No visible hernias or masses appreciated.  External port located in the left upper quadrant and easily palpable.  LYMPHATIC:  No significant adenopathy  visualized.    EXTREMITIES: Grossly normal.  No evidence of chronic venous stasis.    NEUROLOGIC: Focally intact  INTEGUMENT: No open lesions or ulcers appreciated visually  PSYCHIATRIC: Normal affect. He has a good grasp on the nature of his obesity and the treatment options.    LABS:      Assessment/Plan: 53 year old for whom the adjustable gastric band is no longer providing sufficient benefit to justify its continued existence.  He is aware of the potential for weight regain following band removal and still has no interest and conversion to another bariatric procedure such as a gastric bypass.  We discussed the risks and benefits of surgery including risk of bleeding or iatrogenic injury to the liver or stomach.  We discussed his perioperative course and postoperative activity restrictions.  We will plan to proceed with a laparoscopic removal of adjustable gastric band at his convenience.      Hernesto Alan MD ,MD  [facility] Department of Surgery

## 2023-04-21 ENCOUNTER — DOCUMENTATION ONLY (OUTPATIENT)
Dept: SURGERY | Facility: CLINIC | Age: 53
End: 2023-04-21
Payer: COMMERCIAL

## 2023-04-27 NOTE — PROGRESS NOTES
Medication Therapy Management (MTM) Encounter    ASSESSMENT:                            1. Type 2 diabetes mellitus   A1c improved to 7.8%, above goal <7% per ADA guidelines, but noted improvement since change from Ozempic to Mounjaro and titrating dose. Recently increased to max dose Mounjaro so will continue with this along with lifestyle modification and recheck A1c in 3-4 months. If A1c remains above goal, have previously discussed addition of SGLT-2 inhibitor.     2. Hypertension  Blood pressure is well controlled and meeting goal of <130/80 mm Hg per ACC/AHA hypertension guidelines.    3. Hyperlipidemia  Appropriately on a moderate intensity statin given age and diabetes diagnosis per ACC/AHA guidelines.     PLAN:                            Check BMP, A1c.       Follow-up: Return in about 3 months (around 8/1/2023).    SUBJECTIVE/OBJECTIVE:                          Bob Neely is a 52 year old male coming in a follow up visit. He was referred to me from Andres Gagnon. This is a follow up from 2/28/23.      Reason for visit: diabetes/Mounjaro follow up    Allergies/ADRs: Reviewed in chart  Past Medical History: Reviewed in chart  Tobacco: He reports that he has never smoked. He has never used smokeless tobacco.  Alcohol: yes, rarely    Medication Adherence/Access: no issues reported. Says he never forgets a dose. Takes medications twice daily. Uses a pillbox.     1. Type 2 diabetes mellitus  Currently taking metformin 1000 mg twice daily and Mounjaro 15 mg subcutaneous weekly. Dose of Mounjaro increased two months ago. Last year, we stopped Ozempic and replaced with Mounjaro as he was no longer noticing appetite suppression and was gaining back weight. Blood sugars were also still high. Tolerating Mounjaro well. Has very mild loose stool the day after his injection, but tolerable. He is losing weight, down close to his high school weight, and noticing appetite supression. Plans to get lap band  removed as he has trouble eating protein. Blood sugars are continuing to improve, but lately have been stable. Usually checking fasting in the morning or between meals.   AM: 160-170  Post-prandial: 130-140  Symptoms of low blood sugar? none  Symptoms of high blood sugar? none  Diet/Exercise: Follows good diabetic diet and is physically active; eats low carb.  Aspirin: Not taking due to age/low risk  Statin: Yes: simvastatin    ACEi/ARB: Yes: lisinopril    Hemoglobin A1C   Date Value Ref Range Status   05/01/2023 7.8 (H) 0.0 - 5.6 % Final     Comment:     Normal <5.7%   Prediabetes 5.7-6.4%    Diabetes 6.5% or higher     Note: Adopted from ADA consensus guidelines.   12/05/2022 9.2 (H) 0.0 - 5.6 % Final     Comment:     Normal <5.7%   Prediabetes 5.7-6.4%    Diabetes 6.5% or higher     Note: Adopted from ADA consensus guidelines.   04/22/2022 8.6 (H) 0.0 - 5.6 % Final     Comment:     Normal <5.7%   Prediabetes 5.7-6.4%    Diabetes 6.5% or higher     Note: Adopted from ADA consensus guidelines.      Microalbumin Urine mg/dL   Date Value Ref Range Status   08/20/2021 1.68 0.00 - 1.99 mg/dL Final      Creatinine Urine mg/dL   Date Value Ref Range Status   12/05/2022 520.0 mg/dL Final     Comment:     The reference ranges have not been established in urine creatinine. The results should be integrated into the clinical context for interpretation.   08/20/2021 242 mg/dL Final     LDL Cholesterol Calculated   Date Value Ref Range Status   12/05/2022 67 <=100 mg/dL Final     Creatinine   Date Value Ref Range Status   12/05/2022 1.14 0.67 - 1.17 mg/dL Final     2. Hypertension  Current medications include lisinopril 20 mg daily.  Patient does not self-monitor blood pressure.  Patient reports no current medication side effects.    BP Readings from Last 3 Encounters:   05/01/23 126/70   04/12/23 134/80   12/05/22 103/70     3. Hyperlipidemia  Current therapy includes simvastatin 40 mg every evening.  Patient reports no  significant myalgias or other side effects.    The 10-year ASCVD risk score (Davina LYLES, et al., 2019) is: 7.8%    Values used to calculate the score:      Age: 53 years      Sex: Male      Is Non- : No      Diabetic: Yes      Tobacco smoker: No      Systolic Blood Pressure: 126 mmHg      Is BP treated: Yes      HDL Cholesterol: 57 mg/dL      Total Cholesterol: 152 mg/dL     Recent Labs   Lab Test 12/05/22  0848 04/22/22  0934   CHOL 152 112   HDL 57 36*   LDL 67 47   TRIG 140 144       Vitals:   BP Readings from Last 3 Encounters:   05/01/23 126/70   04/12/23 134/80   12/05/22 103/70      Pulse Readings from Last 3 Encounters:   05/01/23 76   12/05/22 77   04/22/22 76     Wt Readings from Last 3 Encounters:   05/01/23 280 lb (127 kg)   04/20/23 284 lb (128.8 kg)   04/12/23 282 lb 3.2 oz (128 kg)     ----------------    I spent 25 minutes with this patient today. All changes were made via collaborative practice agreement with Andres Dc MD. A copy of the visit note was provided to the patient's provider(s).    The patient was given a summary of these recommendations via [employer].     Mayda Dupree, PharmD, BCACP  Medication Management (MTM) Pharmacist  [facility]        Medication Therapy Recommendations  Type 2 diabetes mellitus (H)    Current Medication: tirzepatide (MOUNJARO) 15 MG/0.5ML pen   Rationale: Medication requires monitoring - Needs additional monitoring - Effectiveness   Recommendation: Order Lab   Status: Accepted per CPA

## 2023-04-29 DIAGNOSIS — E78.49 OTHER HYPERLIPIDEMIA: ICD-10-CM

## 2023-04-30 RX ORDER — SIMVASTATIN 40 MG
TABLET ORAL
Qty: 90 TABLET | Refills: 2 | Status: SHIPPED | OUTPATIENT
Start: 2023-04-30 | End: 2024-01-02

## 2023-04-30 NOTE — TELEPHONE ENCOUNTER
"Last Written Prescription Date:  9/13/22  Last Fill Quantity: 90,  # refills: 2   Last office visit provider:  12/5/22     Requested Prescriptions   Pending Prescriptions Disp Refills     simvastatin (ZOCOR) 40 MG tablet [Pharmacy Med Name: SIMVASTATIN 40 MG TABLET] 90 tablet 2     Sig: TAKE 1 TABLET BY MOUTH EVERY DAY       Statins Protocol Passed - 4/29/2023  2:49 PM        Passed - LDL on file in past 12 months     Recent Labs   Lab Test 12/05/22  0848   LDL 67             Passed - No abnormal creatine kinase in past 12 months     No lab results found.             Passed - Recent (12 mo) or future (30 days) visit within the authorizing provider's specialty     Patient has had an office visit with the authorizing provider or a provider within the authorizing providers department within the previous 12 mos or has a future within next 30 days. See \"Patient Info\" tab in inbasket, or \"Choose Columns\" in Meds & Orders section of the refill encounter.              Passed - Medication is active on med list        Passed - Patient is age 18 or older             Ashley Lake RN 04/30/23 2:11 PM  " Called mom to schedule US and schedule labs. Mom states she wants to go to [facility] for labs as it is cheaper with her insurance. Sent lab orders to [email or web address].

## 2023-05-01 ENCOUNTER — OFFICE VISIT (OUTPATIENT)
Dept: PHARMACY | Facility: CLINIC | Age: 53
End: 2023-05-01
Payer: COMMERCIAL

## 2023-05-01 ENCOUNTER — LAB (OUTPATIENT)
Dept: LAB | Facility: CLINIC | Age: 53
End: 2023-05-01
Payer: COMMERCIAL

## 2023-05-01 VITALS
BODY MASS INDEX: 32.36 KG/M2 | WEIGHT: 280 LBS | DIASTOLIC BLOOD PRESSURE: 70 MMHG | SYSTOLIC BLOOD PRESSURE: 126 MMHG | HEART RATE: 76 BPM

## 2023-05-01 DIAGNOSIS — E11.69 TYPE 2 DIABETES MELLITUS WITH OTHER SPECIFIED COMPLICATION, WITHOUT LONG-TERM CURRENT USE OF INSULIN (H): ICD-10-CM

## 2023-05-01 DIAGNOSIS — E78.49 OTHER HYPERLIPIDEMIA: ICD-10-CM

## 2023-05-01 DIAGNOSIS — E11.69 TYPE 2 DIABETES MELLITUS WITH OTHER SPECIFIED COMPLICATION, WITHOUT LONG-TERM CURRENT USE OF INSULIN (H): Primary | ICD-10-CM

## 2023-05-01 DIAGNOSIS — I10 ESSENTIAL HYPERTENSION: ICD-10-CM

## 2023-05-01 DIAGNOSIS — I10 ESSENTIAL (PRIMARY) HYPERTENSION: ICD-10-CM

## 2023-05-01 DIAGNOSIS — N52.9 MALE ERECTILE DISORDER: ICD-10-CM

## 2023-05-01 LAB
ANION GAP SERPL CALCULATED.3IONS-SCNC: 10 MMOL/L (ref 7–15)
BUN SERPL-MCNC: 11.7 MG/DL (ref 6–20)
CALCIUM SERPL-MCNC: 9.7 MG/DL (ref 8.6–10)
CHLORIDE SERPL-SCNC: 102 MMOL/L (ref 98–107)
CREAT SERPL-MCNC: 1.19 MG/DL (ref 0.67–1.17)
DEPRECATED HCO3 PLAS-SCNC: 25 MMOL/L (ref 22–29)
GFR SERPL CREATININE-BSD FRML MDRD: 72 ML/MIN/1.73M2
GLUCOSE SERPL-MCNC: 177 MG/DL (ref 70–99)
HBA1C MFR BLD: 7.8 % (ref 0–5.6)
POTASSIUM SERPL-SCNC: 4.8 MMOL/L (ref 3.4–5.3)
SODIUM SERPL-SCNC: 137 MMOL/L (ref 136–145)

## 2023-05-01 PROCEDURE — 36415 COLL VENOUS BLD VENIPUNCTURE: CPT

## 2023-05-01 PROCEDURE — 99607 MTMS BY PHARM ADDL 15 MIN: CPT | Performed by: PHARMACIST

## 2023-05-01 PROCEDURE — 80048 BASIC METABOLIC PNL TOTAL CA: CPT

## 2023-05-01 PROCEDURE — 99606 MTMS BY PHARM EST 15 MIN: CPT | Performed by: PHARMACIST

## 2023-05-01 PROCEDURE — 83036 HEMOGLOBIN GLYCOSYLATED A1C: CPT

## 2023-05-01 RX ORDER — LISINOPRIL 20 MG/1
20 TABLET ORAL DAILY
Qty: 90 TABLET | Refills: 3 | Status: SHIPPED | OUTPATIENT
Start: 2023-05-01 | End: 2024-06-10

## 2023-05-01 RX ORDER — TADALAFIL 20 MG/1
TABLET ORAL
Qty: 8 TABLET | Refills: 5 | Status: SHIPPED | OUTPATIENT
Start: 2023-05-01 | End: 2024-06-10

## 2023-05-01 NOTE — PATIENT INSTRUCTIONS
"Recommendations from today's Medication Management (MTM) visit:                                                      Checking labs today. Congrats on A1c improvement to 7.8%!      To schedule another MTM appointment, please call the MTM scheduling line at [phone] or toll-free at [phone].     My MTM pharmacist's contact information:      Please feel free to contact me with any questions or concerns you have via [email or web address] or calling the clinic.       Mayda Dupree, PharmD, [facility]  Medication Management (MTM) Pharmacist  [facility]     It was great speaking with you today.  I value your experience and would be very thankful for your time in providing feedback in our clinic survey. In the next few days, you may receive an email or text message from [employer] with a link to a survey related to your  clinical pharmacist.\"     "

## 2023-05-16 ENCOUNTER — DOCUMENTATION ONLY (OUTPATIENT)
Dept: SURGERY | Facility: CLINIC | Age: 53
End: 2023-05-16
Payer: COMMERCIAL

## 2023-05-16 NOTE — PROGRESS NOTES
I have set up a peer to peer review with Dr. Hernesto Alan for Friday, May 19, 2023 @ 10:45 am with Dr. Naga Dennis, Medical Director for [facility] after receiving a denial for Bob to have his lap band removed due to maladaptive eating and GERD.      This was scheduled with Olinda @ [facility] [phone].      REF # [id number]

## 2023-05-22 ENCOUNTER — DOCUMENTATION ONLY (OUTPATIENT)
Dept: SURGERY | Facility: CLINIC | Age: 53
End: 2023-05-22
Payer: COMMERCIAL

## 2023-05-22 NOTE — PROGRESS NOTES
Bob is scheduled for a lap band and port removal with Dr. Hernesto Alan at [facility] on Tuesday, July 5, 2023 @ 8:30 am.  He has been instructed to be NPO 8 hours prior to check in, have a responsible  to transport him home after the surgery and to have a pre op physical with his PCP.      # [id number]  04/21/23 - 04/20/24

## 2023-06-26 ENCOUNTER — OFFICE VISIT (OUTPATIENT)
Dept: FAMILY MEDICINE | Facility: CLINIC | Age: 53
End: 2023-06-26
Payer: COMMERCIAL

## 2023-06-26 VITALS
HEART RATE: 72 BPM | HEIGHT: 78 IN | SYSTOLIC BLOOD PRESSURE: 132 MMHG | OXYGEN SATURATION: 100 % | BODY MASS INDEX: 31.24 KG/M2 | WEIGHT: 270 LBS | DIASTOLIC BLOOD PRESSURE: 78 MMHG | TEMPERATURE: 97 F

## 2023-06-26 DIAGNOSIS — E11.69 TYPE 2 DIABETES MELLITUS WITH OTHER SPECIFIED COMPLICATION, WITHOUT LONG-TERM CURRENT USE OF INSULIN (H): ICD-10-CM

## 2023-06-26 DIAGNOSIS — Z01.818 PREOP EXAMINATION: Primary | ICD-10-CM

## 2023-06-26 DIAGNOSIS — E66.01 MORBID OBESITY (H): ICD-10-CM

## 2023-06-26 DIAGNOSIS — I10 ESSENTIAL HYPERTENSION: ICD-10-CM

## 2023-06-26 DIAGNOSIS — E78.49 OTHER HYPERLIPIDEMIA: ICD-10-CM

## 2023-06-26 LAB
ANION GAP SERPL CALCULATED.3IONS-SCNC: 10 MMOL/L (ref 7–15)
BUN SERPL-MCNC: 12.7 MG/DL (ref 6–20)
CALCIUM SERPL-MCNC: 9.7 MG/DL (ref 8.6–10)
CHLORIDE SERPL-SCNC: 101 MMOL/L (ref 98–107)
CHOLEST SERPL-MCNC: 113 MG/DL
CREAT SERPL-MCNC: 1.19 MG/DL (ref 0.67–1.17)
DEPRECATED HCO3 PLAS-SCNC: 26 MMOL/L (ref 22–29)
ERYTHROCYTE [DISTWIDTH] IN BLOOD BY AUTOMATED COUNT: 12.8 % (ref 10–15)
GFR SERPL CREATININE-BSD FRML MDRD: 72 ML/MIN/1.73M2
GLUCOSE SERPL-MCNC: 175 MG/DL (ref 70–99)
HCT VFR BLD AUTO: 39.9 % (ref 40–53)
HDLC SERPL-MCNC: 50 MG/DL
HGB BLD-MCNC: 14 G/DL (ref 13.3–17.7)
LDLC SERPL CALC-MCNC: 41 MG/DL
MCH RBC QN AUTO: 30.5 PG (ref 26.5–33)
MCHC RBC AUTO-ENTMCNC: 35.1 G/DL (ref 31.5–36.5)
MCV RBC AUTO: 87 FL (ref 78–100)
NONHDLC SERPL-MCNC: 63 MG/DL
PLATELET # BLD AUTO: 231 10E3/UL (ref 150–450)
POTASSIUM SERPL-SCNC: 4.3 MMOL/L (ref 3.4–5.3)
RBC # BLD AUTO: 4.59 10E6/UL (ref 4.4–5.9)
SODIUM SERPL-SCNC: 137 MMOL/L (ref 136–145)
TRIGL SERPL-MCNC: 110 MG/DL
WBC # BLD AUTO: 5 10E3/UL (ref 4–11)

## 2023-06-26 PROCEDURE — 80048 BASIC METABOLIC PNL TOTAL CA: CPT | Performed by: FAMILY MEDICINE

## 2023-06-26 PROCEDURE — 99214 OFFICE O/P EST MOD 30 MIN: CPT | Performed by: FAMILY MEDICINE

## 2023-06-26 PROCEDURE — 85027 COMPLETE CBC AUTOMATED: CPT | Performed by: FAMILY MEDICINE

## 2023-06-26 PROCEDURE — 36415 COLL VENOUS BLD VENIPUNCTURE: CPT | Performed by: FAMILY MEDICINE

## 2023-06-26 PROCEDURE — 80061 LIPID PANEL: CPT | Performed by: FAMILY MEDICINE

## 2023-06-26 NOTE — H&P (VIEW-ONLY)
[facility]  [address] [place] [id number]  Phone: [phone]  Fax: [phone]  Primary Provider: Andres Becker  Pre-op Performing Provider: ANDRES BECKER       PREOPERATIVE EVALUATION:  Today's date: 6/26/2023    Bob Neely Jr. is a 53 year old male who presents for a preoperative evaluation.      6/26/2023     7:49 AM   Additional Questions   Roomed by Hussain X     Surgical Information:  Surgery/Procedure: LAPAROSCOPY, REMOVAL OF GASTRIC BAND  Surgery Location: [facility]  Surgeon: Hernesto Alan MD  Surgery Date: 07/05/2023  Time of Surgery: 8:30 AM  Where patient plans to recover: At home with family  Fax number for surgical facility: Note does not need to be faxed, will be available electronically in Epic.    Assessment & Plan     The proposed surgical procedure is considered LOW risk.    (Z01.818) Preop examination  (primary encounter diagnosis)  Comment: For removal of a previously placed gastric band, which is now causing adverse symptoms  Plan: CBC with platelets             (E66.01) Morbid obesity (H)  Comment: Patient has lost significant amount of weight through diet exercise and also Mounjaro  Plan:      (E11.69) Type 2 diabetes mellitus (H)  Comment: Slight suboptimal control A1c 7.8 on May 1, though patient's blood sugar values have been improving  Plan: Basic metabolic panel             (I10) Hypertension  Comment: Currently well controlled  Plan:      (E78.49) Hyperlipidemia  Comment: Controlled with simvastatin  Plan: Lipid panel reflex to direct LDL Fasting             PLAN:  1.  CBC, basic metabolic profile and lipid cascade reviewed, results essentially normal see below.  2.  Recommend that the patient hold metformin the morning of surgery  3.  Recommend that the patient hold Mounjaro at least 7 days prior to the surgical procedure  4.  Patient is otherwise cleared for surgery  5.  3-month follow-up              - No identified  additional risk factors other than previously addressed    Antiplatelet or Anticoagulation Medication Instructions:   - Patient is on no antiplatelet or anticoagulation medications.    Additional Medication Instructions:  See under plan    RECOMMENDATION:  APPROVAL GIVEN to proceed with proposed procedure, without further diagnostic evaluation.            Subjective       HPI related to upcoming procedure: Patient comes in for preoperative clearance for the removal of a previously placed laparoscopic band this was done in 2007.  The patient is having trouble swallowing especially proteins, and he has met with one of our general surgeons who does believe that the gastric band itself is the cause of this rather than some other esophageal or GI disorder.  Of note patient's maximum weight was 440 pounds, however subsequent to that he has made dramatic improvements to his diet as well as exercise he is not stress eating he is also on Mounjaro for diabetes but this is also been effective for weight loss.    The patient is not concerned that with removal of the gastric band that he is going to start to gain weight since he has made changes as above.    Since diabetes mellitus.   -Diabetic diet  -Continue home diabetes regimen  -Start sliding-scale insulin, has not always been optimally controlled most recent A1c was 7.8, however patient believes that it would be better if we check today, since his blood sugars are running generally very well controlled in the low 100s.            6/21/2023     5:16 PM   Preop Questions   1. Have you ever had a heart attack or stroke? No   2. Have you ever had surgery on your heart or blood vessels, such as a stent placement, a coronary artery bypass, or surgery on an artery in your head, neck, heart, or legs? No   3. Do you have chest pain with activity? No   4. Do you have a history of  heart failure? No   5. Do you currently have a cold, bronchitis or symptoms of other infection? No   6.  Do you have a cough, shortness of breath, or wheezing? No   7. Do you or anyone in your family have previous history of blood clots? No   8. Do you or does anyone in your family have a serious bleeding problem such as prolonged bleeding following surgeries or cuts? No   9. Have you ever had problems with anemia or been told to take iron pills? No   10. Have you had any abnormal blood loss such as black, tarry or bloody stools? No   11. Have you ever had a blood transfusion? No   12. Are you willing to have a blood transfusion if it is medically needed before, during, or after your surgery? Yes   13. Have you or any of your relatives ever had problems with anesthesia? No   14. Do you have sleep apnea, excessive snoring or daytime drowsiness? No   15. Do you have any artifical heart valves or other implanted medical devices like a pacemaker, defibrillator, or continuous glucose monitor? No   16. Do you have artificial joints? No   17. Are you allergic to latex? No       Health Care Directive:  Patient does not have a Health Care Directive or Living Will: Advance Directive received and scanned. Click on Code in the patient header to view.    Preoperative Review of :   reviewed - no record of controlled substances prescribed.       Status of Chronic Conditions:  See problem list for active medical problems.  Problems all longstanding and stable, except as noted/documented.  See ROS for pertinent symptoms related to these conditions.      Review of Systems  CONSTITUTIONAL: NEGATIVE for fever, chills, change in weight  INTEGUMENTARY/SKIN: NEGATIVE for worrisome rashes, moles or lesions  EYES: NEGATIVE for vision changes or irritation  ENT/MOUTH: NEGATIVE for ear, mouth and throat problems  RESP: NEGATIVE for significant cough or SOB  CV: NEGATIVE for chest pain, palpitations or peripheral edema  GI: NEGATIVE for nausea, abdominal pain, heartburn, or change in bowel habits  : NEGATIVE for frequency, dysuria, or  hematuria  MUSCULOSKELETAL: NEGATIVE for significant arthralgias or myalgia  NEURO: NEGATIVE for weakness, dizziness or paresthesias  ENDOCRINE: NEGATIVE for temperature intolerance, skin/hair changes  HEME: NEGATIVE for bleeding problems  PSYCHIATRIC: NEGATIVE for changes in mood or affect    Patient Active Problem List    Diagnosis Date Noted     Type 2 diabetes mellitus (H)      Priority: Medium     Actos-prior  Glipizide, hold July 2019  Metformin  Ozempic, May 2022  Mounjaro, GIP, begin Sept 2022         Male Erectile Disorder      Priority: Medium     Hypertension      Priority: Medium     Lisinipril-         Psoriasis      Priority: Medium     Hyperlipidemia      Priority: Medium     Simvastatin-Don't have pre-med values-goal less than 100         Vitamin D Deficiency      Priority: Medium     Level 15.9-  About 2000 units daily  Replacement Utility updated for latest IMO load         Esophageal reflux      Priority: Medium     prilosec         Morbid obesity (H)      Priority: Medium     lap band  was 440; was down to 320-Goal is 280         Herpes zoster (shingles) 11/15/2014     Priority: Medium     Clinical Dx  L back, abd          Past Medical History:   Diagnosis Date     Anxiety     Previously on Zoloft     Past Surgical History:   Procedure Laterality Date     APPENDECTOMY       LAPAROSCOPIC GASTRIC BANDING      Laparosc Restrictive Proc Adjustable Gastric Band Placement; LAP BAND; was over 440     Current Outpatient Medications   Medication Sig Dispense Refill     blood glucose (ACCU-CHEK GUIDE) test strip Use to test blood sugar 2 times daily or as directed. 200 strip 3     blood glucose monitoring (ACCU-CHEK FASTCLIX) lancets Use to test blood sugar 2 times daily or as directed. 200 each 3     lisinopril (ZESTRIL) 20 MG tablet Take 1 tablet (20 mg) by mouth daily 90 tablet 3     metFORMIN (GLUCOPHAGE) 1000 MG tablet [METFORMIN (GLUCOPHAGE) 1000 MG TABLET] TAKE 1 TABLET BY MOUTH TWICE A DAY WITH  "FOOD 180 tablet 3     simvastatin (ZOCOR) 40 MG tablet TAKE 1 TABLET BY MOUTH EVERY DAY 90 tablet 2     tadalafil (CIALIS) 20 MG tablet TAKE HALF A TAB TO 1 FULL TAB BY MOUTH DAILY AS NEEDED 8 tablet 5     tirzepatide (MOUNJARO) 15 MG/0.5ML pen Inject 15 mg Subcutaneous every 7 days 6 mL 1       No Known Allergies     Social History     Tobacco Use     Smoking status: Never     Smokeless tobacco: Never   Substance Use Topics     Alcohol use: Not Currently     Comment: Alcoholic Drinks/day: Rare     Family History   Problem Relation Age of Onset     Hyperlipidemia Father      Prostate Cancer Father 52     Psoriasis Father         Severe-with arthitis disabled      Diabetes Type 2  Brother      Hypertension Brother      Psoriatic Arthritis Brother      Coronary Artery Disease Maternal Grandmother      Prostate Cancer Maternal Grandfather      Diabetes Type 2  Paternal Grandmother      Aneurysm Paternal Grandfather         Cardiovascular     Diabetes Type 2  Paternal Uncle      Diabetes Type 2  Paternal Uncle      Diabetes Type 2  Paternal Uncle      History   Drug Use No         Objective     /78 (BP Location: Left arm, Patient Position: Sitting, Cuff Size: Adult Regular)   Pulse 72   Temp 97  F (36.1  C) (Temporal)   Ht 1.981 m (6' 6\")   Wt 122.5 kg (270 lb)   SpO2 100%   BMI 31.20 kg/m      Physical Exam    GENERAL APPEARANCE: healthy, alert and no distress     EYES: EOMI,  PERRL     HENT: ear canals and TM's normal and nose and mouth without ulcers or lesions     NECK: no adenopathy, no asymmetry, masses, or scars and thyroid normal to palpation     RESP: lungs clear to auscultation - no rales, rhonchi or wheezes     CV: regular rates and rhythm, normal S1 S2, no S3 or S4 and no murmur, click or rub     ABDOMEN:  soft, nontender, no HSM or masses and bowel sounds normal     MS: extremities normal- no gross deformities noted, no evidence of inflammation in joints, FROM in all extremities.     SKIN: no " suspicious lesions or rashes     NEURO: Normal strength and tone, sensory exam grossly normal, mentation intact and speech normal     PSYCH: mentation appears normal. and affect normal/bright     LYMPHATICS: No cervical adenopathy    Recent Labs   Lab Test 05/01/23  0759 12/05/22  0848    136   POTASSIUM 4.8 3.9   CR 1.19* 1.14   A1C 7.8* 9.2*        Diagnostics:  Recent Results (from the past 48 hour(s))   Basic metabolic panel    Collection Time: 06/26/23  8:28 AM   Result Value Ref Range    Sodium 137 136 - 145 mmol/L    Potassium 4.3 3.4 - 5.3 mmol/L    Chloride 101 98 - 107 mmol/L    Carbon Dioxide (CO2) 26 22 - 29 mmol/L    Anion Gap 10 7 - 15 mmol/L    Urea Nitrogen 12.7 6.0 - 20.0 mg/dL    Creatinine 1.19 (H) 0.67 - 1.17 mg/dL    Calcium 9.7 8.6 - 10.0 mg/dL    Glucose 175 (H) 70 - 99 mg/dL    GFR Estimate 72 >60 mL/min/1.73m2   CBC with platelets    Collection Time: 06/26/23  8:28 AM   Result Value Ref Range    WBC Count 5.0 4.0 - 11.0 10e3/uL    RBC Count 4.59 4.40 - 5.90 10e6/uL    Hemoglobin 14.0 13.3 - 17.7 g/dL    Hematocrit 39.9 (L) 40.0 - 53.0 %    MCV 87 78 - 100 fL    MCH 30.5 26.5 - 33.0 pg    MCHC 35.1 31.5 - 36.5 g/dL    RDW 12.8 10.0 - 15.0 %    Platelet Count 231 150 - 450 10e3/uL   Lipid panel reflex to direct LDL Fasting    Collection Time: 06/26/23  8:28 AM   Result Value Ref Range    Cholesterol 113 <200 mg/dL    Triglycerides 110 <150 mg/dL    Direct Measure HDL 50 >=40 mg/dL    LDL Cholesterol Calculated 41 <=100 mg/dL    Non HDL Cholesterol 63 <130 mg/dL      No EKG required for low risk surgery (cataract, skin procedure, breast biopsy, etc).  No EKG required, no history of coronary heart disease, significant arrhythmia, peripheral arterial disease or other structural heart disease.    Revised Cardiac Risk Index (RCRI):  The patient has the following serious cardiovascular risks for perioperative complications:   - No serious cardiac risks = 0 points     RCRI Interpretation: 0  points: Class I (very low risk - 0.4% complication rate)           Signed Electronically by: Andres Dc MD  Copy of this evaluation report is provided to requesting physician.

## 2023-06-26 NOTE — PATIENT INSTRUCTIONS
The morning of surgery do not take the metformin, and make sure that you do not take Mounjaro within 7 days prior to the surgery, there is some concern that since it slows gastric emptying this could make the anesthesia little bit more difficult  See me again in 3 months

## 2023-06-26 NOTE — LETTER
June 27, 2023      Biju Neely Jr.  [address] [facility] [address]        Dear ,    We are writing to inform you of your test results.  Creatinine the kidney test is just barely elevated this is probably not significant the rest of the kidney tests are normal.  Blood counts are normal, you are not anemic.  Cholesterol is extremely well controlled    See us again in 3 months.    Resulted Orders   Basic metabolic panel   Result Value Ref Range    Sodium 137 136 - 145 mmol/L    Potassium 4.3 3.4 - 5.3 mmol/L    Chloride 101 98 - 107 mmol/L    Carbon Dioxide (CO2) 26 22 - 29 mmol/L    Anion Gap 10 7 - 15 mmol/L    Urea Nitrogen 12.7 6.0 - 20.0 mg/dL    Creatinine 1.19 (H) 0.67 - 1.17 mg/dL    Calcium 9.7 8.6 - 10.0 mg/dL    Glucose 175 (H) 70 - 99 mg/dL    GFR Estimate 72 >60 mL/min/1.73m2   CBC with platelets   Result Value Ref Range    WBC Count 5.0 4.0 - 11.0 10e3/uL    RBC Count 4.59 4.40 - 5.90 10e6/uL    Hemoglobin 14.0 13.3 - 17.7 g/dL    Hematocrit 39.9 (L) 40.0 - 53.0 %    MCV 87 78 - 100 fL    MCH 30.5 26.5 - 33.0 pg    MCHC 35.1 31.5 - 36.5 g/dL    RDW 12.8 10.0 - 15.0 %    Platelet Count 231 150 - 450 10e3/uL   Lipid panel reflex to direct LDL Fasting   Result Value Ref Range    Cholesterol 113 <200 mg/dL    Triglycerides 110 <150 mg/dL    Direct Measure HDL 50 >=40 mg/dL    LDL Cholesterol Calculated 41 <=100 mg/dL    Non HDL Cholesterol 63 <130 mg/dL    Narrative    Cholesterol  Desirable:  <200 mg/dL    Triglycerides  Normal:  Less than 150 mg/dL  Borderline High:  150-199 mg/dL  High:  200-499 mg/dL  Very High:  Greater than or equal to 500 mg/dL    Direct Measure HDL  Female:  Greater than or equal to 50 mg/dL   Male:  Greater than or equal to 40 mg/dL    LDL Cholesterol  Desirable:  <100mg/dL  Above Desirable:  100-129 mg/dL   Borderline High:  130-159 mg/dL   High:  160-189 mg/dL   Very High:  >= 190 mg/dL    Non HDL Cholesterol  Desirable:  130 mg/dL  Above Desirable:   130-159 mg/dL  Borderline High:  160-189 mg/dL  High:  190-219 mg/dL  Very High:  Greater than or equal to 220 mg/dL       If you have any questions or concerns, please call the clinic at the number listed above.       Sincerely,      Andres Dc MD

## 2023-06-26 NOTE — PROGRESS NOTES
[facility]  [address] [place] [id number]  Phone: [phone]  Fax: [phone]  Primary Provider: Andres Becker  Pre-op Performing Provider: ANDRES BECKER       PREOPERATIVE EVALUATION:  Today's date: 6/26/2023    Bob Neely Jr. is a 53 year old male who presents for a preoperative evaluation.      6/26/2023     7:49 AM   Additional Questions   Roomed by Hussain X     Surgical Information:  Surgery/Procedure: LAPAROSCOPY, REMOVAL OF GASTRIC BAND  Surgery Location: [facility]  Surgeon: Hernesto Alan MD  Surgery Date: 07/05/2023  Time of Surgery: 8:30 AM  Where patient plans to recover: At home with family  Fax number for surgical facility: Note does not need to be faxed, will be available electronically in Epic.    Assessment & Plan     The proposed surgical procedure is considered LOW risk.    (Z01.818) Preop examination  (primary encounter diagnosis)  Comment: For removal of a previously placed gastric band, which is now causing adverse symptoms  Plan: CBC with platelets             (E66.01) Morbid obesity (H)  Comment: Patient has lost significant amount of weight through diet exercise and also Mounjaro  Plan:      (E11.69) Type 2 diabetes mellitus (H)  Comment: Slight suboptimal control A1c 7.8 on May 1, though patient's blood sugar values have been improving  Plan: Basic metabolic panel             (I10) Hypertension  Comment: Currently well controlled  Plan:      (E78.49) Hyperlipidemia  Comment: Controlled with simvastatin  Plan: Lipid panel reflex to direct LDL Fasting             PLAN:  1.  CBC, basic metabolic profile and lipid cascade reviewed, results essentially normal see below.  2.  Recommend that the patient hold metformin the morning of surgery  3.  Recommend that the patient hold Mounjaro at least 7 days prior to the surgical procedure  4.  Patient is otherwise cleared for surgery  5.  3-month follow-up              - No identified  additional risk factors other than previously addressed    Antiplatelet or Anticoagulation Medication Instructions:   - Patient is on no antiplatelet or anticoagulation medications.    Additional Medication Instructions:  See under plan    RECOMMENDATION:  APPROVAL GIVEN to proceed with proposed procedure, without further diagnostic evaluation.            Subjective       HPI related to upcoming procedure: Patient comes in for preoperative clearance for the removal of a previously placed laparoscopic band this was done in 2007.  The patient is having trouble swallowing especially proteins, and he has met with one of our general surgeons who does believe that the gastric band itself is the cause of this rather than some other esophageal or GI disorder.  Of note patient's maximum weight was 440 pounds, however subsequent to that he has made dramatic improvements to his diet as well as exercise he is not stress eating he is also on Mounjaro for diabetes but this is also been effective for weight loss.    The patient is not concerned that with removal of the gastric band that he is going to start to gain weight since he has made changes as above.    Since diabetes mellitus.   -Diabetic diet  -Continue home diabetes regimen  -Start sliding-scale insulin, has not always been optimally controlled most recent A1c was 7.8, however patient believes that it would be better if we check today, since his blood sugars are running generally very well controlled in the low 100s.            6/21/2023     5:16 PM   Preop Questions   1. Have you ever had a heart attack or stroke? No   2. Have you ever had surgery on your heart or blood vessels, such as a stent placement, a coronary artery bypass, or surgery on an artery in your head, neck, heart, or legs? No   3. Do you have chest pain with activity? No   4. Do you have a history of  heart failure? No   5. Do you currently have a cold, bronchitis or symptoms of other infection? No   6.  Do you have a cough, shortness of breath, or wheezing? No   7. Do you or anyone in your family have previous history of blood clots? No   8. Do you or does anyone in your family have a serious bleeding problem such as prolonged bleeding following surgeries or cuts? No   9. Have you ever had problems with anemia or been told to take iron pills? No   10. Have you had any abnormal blood loss such as black, tarry or bloody stools? No   11. Have you ever had a blood transfusion? No   12. Are you willing to have a blood transfusion if it is medically needed before, during, or after your surgery? Yes   13. Have you or any of your relatives ever had problems with anesthesia? No   14. Do you have sleep apnea, excessive snoring or daytime drowsiness? No   15. Do you have any artifical heart valves or other implanted medical devices like a pacemaker, defibrillator, or continuous glucose monitor? No   16. Do you have artificial joints? No   17. Are you allergic to latex? No       Health Care Directive:  Patient does not have a Health Care Directive or Living Will: Advance Directive received and scanned. Click on Code in the patient header to view.    Preoperative Review of :   reviewed - no record of controlled substances prescribed.       Status of Chronic Conditions:  See problem list for active medical problems.  Problems all longstanding and stable, except as noted/documented.  See ROS for pertinent symptoms related to these conditions.      Review of Systems  CONSTITUTIONAL: NEGATIVE for fever, chills, change in weight  INTEGUMENTARY/SKIN: NEGATIVE for worrisome rashes, moles or lesions  EYES: NEGATIVE for vision changes or irritation  ENT/MOUTH: NEGATIVE for ear, mouth and throat problems  RESP: NEGATIVE for significant cough or SOB  CV: NEGATIVE for chest pain, palpitations or peripheral edema  GI: NEGATIVE for nausea, abdominal pain, heartburn, or change in bowel habits  : NEGATIVE for frequency, dysuria, or  hematuria  MUSCULOSKELETAL: NEGATIVE for significant arthralgias or myalgia  NEURO: NEGATIVE for weakness, dizziness or paresthesias  ENDOCRINE: NEGATIVE for temperature intolerance, skin/hair changes  HEME: NEGATIVE for bleeding problems  PSYCHIATRIC: NEGATIVE for changes in mood or affect    Patient Active Problem List    Diagnosis Date Noted     Type 2 diabetes mellitus (H)      Priority: Medium     Actos-prior  Glipizide, hold July 2019  Metformin  Ozempic, May 2022  Mounjaro, GIP, begin Sept 2022         Male Erectile Disorder      Priority: Medium     Hypertension      Priority: Medium     Lisinipril-         Psoriasis      Priority: Medium     Hyperlipidemia      Priority: Medium     Simvastatin-Don't have pre-med values-goal less than 100         Vitamin D Deficiency      Priority: Medium     Level 15.9-  About 2000 units daily  Replacement Utility updated for latest IMO load         Esophageal reflux      Priority: Medium     prilosec         Morbid obesity (H)      Priority: Medium     lap band  was 440; was down to 320-Goal is 280         Herpes zoster (shingles) 11/15/2014     Priority: Medium     Clinical Dx  L back, abd          Past Medical History:   Diagnosis Date     Anxiety     Previously on Zoloft     Past Surgical History:   Procedure Laterality Date     APPENDECTOMY       LAPAROSCOPIC GASTRIC BANDING      Laparosc Restrictive Proc Adjustable Gastric Band Placement; LAP BAND; was over 440     Current Outpatient Medications   Medication Sig Dispense Refill     blood glucose (ACCU-CHEK GUIDE) test strip Use to test blood sugar 2 times daily or as directed. 200 strip 3     blood glucose monitoring (ACCU-CHEK FASTCLIX) lancets Use to test blood sugar 2 times daily or as directed. 200 each 3     lisinopril (ZESTRIL) 20 MG tablet Take 1 tablet (20 mg) by mouth daily 90 tablet 3     metFORMIN (GLUCOPHAGE) 1000 MG tablet [METFORMIN (GLUCOPHAGE) 1000 MG TABLET] TAKE 1 TABLET BY MOUTH TWICE A DAY WITH  "FOOD 180 tablet 3     simvastatin (ZOCOR) 40 MG tablet TAKE 1 TABLET BY MOUTH EVERY DAY 90 tablet 2     tadalafil (CIALIS) 20 MG tablet TAKE HALF A TAB TO 1 FULL TAB BY MOUTH DAILY AS NEEDED 8 tablet 5     tirzepatide (MOUNJARO) 15 MG/0.5ML pen Inject 15 mg Subcutaneous every 7 days 6 mL 1       No Known Allergies     Social History     Tobacco Use     Smoking status: Never     Smokeless tobacco: Never   Substance Use Topics     Alcohol use: Not Currently     Comment: Alcoholic Drinks/day: Rare     Family History   Problem Relation Age of Onset     Hyperlipidemia Father      Prostate Cancer Father 52     Psoriasis Father         Severe-with arthitis disabled      Diabetes Type 2  Brother      Hypertension Brother      Psoriatic Arthritis Brother      Coronary Artery Disease Maternal Grandmother      Prostate Cancer Maternal Grandfather      Diabetes Type 2  Paternal Grandmother      Aneurysm Paternal Grandfather         Cardiovascular     Diabetes Type 2  Paternal Uncle      Diabetes Type 2  Paternal Uncle      Diabetes Type 2  Paternal Uncle      History   Drug Use No         Objective     /78 (BP Location: Left arm, Patient Position: Sitting, Cuff Size: Adult Regular)   Pulse 72   Temp 97  F (36.1  C) (Temporal)   Ht 1.981 m (6' 6\")   Wt 122.5 kg (270 lb)   SpO2 100%   BMI 31.20 kg/m      Physical Exam    GENERAL APPEARANCE: healthy, alert and no distress     EYES: EOMI,  PERRL     HENT: ear canals and TM's normal and nose and mouth without ulcers or lesions     NECK: no adenopathy, no asymmetry, masses, or scars and thyroid normal to palpation     RESP: lungs clear to auscultation - no rales, rhonchi or wheezes     CV: regular rates and rhythm, normal S1 S2, no S3 or S4 and no murmur, click or rub     ABDOMEN:  soft, nontender, no HSM or masses and bowel sounds normal     MS: extremities normal- no gross deformities noted, no evidence of inflammation in joints, FROM in all extremities.     SKIN: no " suspicious lesions or rashes     NEURO: Normal strength and tone, sensory exam grossly normal, mentation intact and speech normal     PSYCH: mentation appears normal. and affect normal/bright     LYMPHATICS: No cervical adenopathy    Recent Labs   Lab Test 05/01/23  0759 12/05/22  0848    136   POTASSIUM 4.8 3.9   CR 1.19* 1.14   A1C 7.8* 9.2*        Diagnostics:  Recent Results (from the past 48 hour(s))   Basic metabolic panel    Collection Time: 06/26/23  8:28 AM   Result Value Ref Range    Sodium 137 136 - 145 mmol/L    Potassium 4.3 3.4 - 5.3 mmol/L    Chloride 101 98 - 107 mmol/L    Carbon Dioxide (CO2) 26 22 - 29 mmol/L    Anion Gap 10 7 - 15 mmol/L    Urea Nitrogen 12.7 6.0 - 20.0 mg/dL    Creatinine 1.19 (H) 0.67 - 1.17 mg/dL    Calcium 9.7 8.6 - 10.0 mg/dL    Glucose 175 (H) 70 - 99 mg/dL    GFR Estimate 72 >60 mL/min/1.73m2   CBC with platelets    Collection Time: 06/26/23  8:28 AM   Result Value Ref Range    WBC Count 5.0 4.0 - 11.0 10e3/uL    RBC Count 4.59 4.40 - 5.90 10e6/uL    Hemoglobin 14.0 13.3 - 17.7 g/dL    Hematocrit 39.9 (L) 40.0 - 53.0 %    MCV 87 78 - 100 fL    MCH 30.5 26.5 - 33.0 pg    MCHC 35.1 31.5 - 36.5 g/dL    RDW 12.8 10.0 - 15.0 %    Platelet Count 231 150 - 450 10e3/uL   Lipid panel reflex to direct LDL Fasting    Collection Time: 06/26/23  8:28 AM   Result Value Ref Range    Cholesterol 113 <200 mg/dL    Triglycerides 110 <150 mg/dL    Direct Measure HDL 50 >=40 mg/dL    LDL Cholesterol Calculated 41 <=100 mg/dL    Non HDL Cholesterol 63 <130 mg/dL      No EKG required for low risk surgery (cataract, skin procedure, breast biopsy, etc).  No EKG required, no history of coronary heart disease, significant arrhythmia, peripheral arterial disease or other structural heart disease.    Revised Cardiac Risk Index (RCRI):  The patient has the following serious cardiovascular risks for perioperative complications:   - No serious cardiac risks = 0 points     RCRI Interpretation: 0  points: Class I (very low risk - 0.4% complication rate)           Signed Electronically by: Andres Dc MD  Copy of this evaluation report is provided to requesting physician.

## 2023-07-05 ENCOUNTER — ANESTHESIA (OUTPATIENT)
Dept: SURGERY | Facility: HOSPITAL | Age: 53
End: 2023-07-05
Payer: COMMERCIAL

## 2023-07-05 ENCOUNTER — ANESTHESIA EVENT (OUTPATIENT)
Dept: SURGERY | Facility: HOSPITAL | Age: 53
End: 2023-07-05
Payer: COMMERCIAL

## 2023-07-05 ENCOUNTER — HOSPITAL ENCOUNTER (OUTPATIENT)
Facility: HOSPITAL | Age: 53
Discharge: HOME | End: 2023-07-05
Attending: SURGERY | Admitting: SURGERY
Payer: COMMERCIAL

## 2023-07-05 VITALS
HEIGHT: 78 IN | DIASTOLIC BLOOD PRESSURE: 61 MMHG | BODY MASS INDEX: 30.68 KG/M2 | TEMPERATURE: 97 F | WEIGHT: 265.2 LBS | OXYGEN SATURATION: 100 % | RESPIRATION RATE: 22 BRPM | HEART RATE: 60 BPM | SYSTOLIC BLOOD PRESSURE: 108 MMHG

## 2023-07-05 DIAGNOSIS — Z98.84 H/O LAPAROSCOPIC ADJUSTABLE GASTRIC BANDING: Primary | ICD-10-CM

## 2023-07-05 LAB
GLUCOSE BLDC GLUCOMTR-MCNC: 148 MG/DL (ref 70–99)
GLUCOSE BLDC GLUCOMTR-MCNC: 151 MG/DL (ref 70–99)

## 2023-07-05 PROCEDURE — 710N000009 HC RECOVERY PHASE 1, LEVEL 1, PER MIN: Performed by: SURGERY

## 2023-07-05 PROCEDURE — 250N000011 HC RX IP 250 OP 636: Mod: JZ | Performed by: NURSE ANESTHETIST, CERTIFIED REGISTERED

## 2023-07-05 PROCEDURE — 360N000077 HC SURGERY LEVEL 4, PER MIN: Performed by: SURGERY

## 2023-07-05 PROCEDURE — 710N000012 HC RECOVERY PHASE 2, PER MINUTE: Performed by: SURGERY

## 2023-07-05 PROCEDURE — 250N000009 HC RX 250: Performed by: NURSE ANESTHETIST, CERTIFIED REGISTERED

## 2023-07-05 PROCEDURE — 258N000003 HC RX IP 258 OP 636: Performed by: ANESTHESIOLOGY

## 2023-07-05 PROCEDURE — 370N000017 HC ANESTHESIA TECHNICAL FEE, PER MIN: Performed by: SURGERY

## 2023-07-05 PROCEDURE — 88300 SURGICAL PATH GROSS: CPT | Mod: TC | Performed by: SURGERY

## 2023-07-05 PROCEDURE — 250N000011 HC RX IP 250 OP 636: Performed by: NURSE ANESTHETIST, CERTIFIED REGISTERED

## 2023-07-05 PROCEDURE — 43774 LAP RMVL GASTR ADJ ALL PARTS: CPT | Performed by: SURGERY

## 2023-07-05 PROCEDURE — 250N000011 HC RX IP 250 OP 636: Mod: JZ | Performed by: ANESTHESIOLOGY

## 2023-07-05 PROCEDURE — 250N000025 HC SEVOFLURANE, PER MIN: Performed by: SURGERY

## 2023-07-05 PROCEDURE — 999N000141 HC STATISTIC PRE-PROCEDURE NURSING ASSESSMENT: Performed by: SURGERY

## 2023-07-05 PROCEDURE — 258N000003 HC RX IP 258 OP 636: Performed by: NURSE ANESTHETIST, CERTIFIED REGISTERED

## 2023-07-05 PROCEDURE — 250N000009 HC RX 250: Performed by: SURGERY

## 2023-07-05 PROCEDURE — 250N000013 HC RX MED GY IP 250 OP 250 PS 637: Performed by: SURGERY

## 2023-07-05 PROCEDURE — 250N000013 HC RX MED GY IP 250 OP 250 PS 637: Performed by: ANESTHESIOLOGY

## 2023-07-05 PROCEDURE — 88300 SURGICAL PATH GROSS: CPT | Mod: 26 | Performed by: PATHOLOGY

## 2023-07-05 PROCEDURE — 43774 LAP RMVL GASTR ADJ ALL PARTS: CPT | Mod: AS | Performed by: NURSE PRACTITIONER

## 2023-07-05 PROCEDURE — 272N000001 HC OR GENERAL SUPPLY STERILE: Performed by: SURGERY

## 2023-07-05 PROCEDURE — 82962 GLUCOSE BLOOD TEST: CPT

## 2023-07-05 PROCEDURE — 250N000011 HC RX IP 250 OP 636: Performed by: SURGERY

## 2023-07-05 RX ORDER — DEXMEDETOMIDINE HYDROCHLORIDE 4 UG/ML
.2-1 INJECTION, SOLUTION INTRAVENOUS CONTINUOUS
Status: DISCONTINUED | OUTPATIENT
Start: 2023-07-05 | End: 2023-07-05 | Stop reason: HOSPADM

## 2023-07-05 RX ORDER — SODIUM CHLORIDE, SODIUM LACTATE, POTASSIUM CHLORIDE, CALCIUM CHLORIDE 600; 310; 30; 20 MG/100ML; MG/100ML; MG/100ML; MG/100ML
INJECTION, SOLUTION INTRAVENOUS CONTINUOUS
Status: DISCONTINUED | OUTPATIENT
Start: 2023-07-05 | End: 2023-07-05 | Stop reason: HOSPADM

## 2023-07-05 RX ORDER — HYDROMORPHONE HYDROCHLORIDE 1 MG/ML
0.4 INJECTION, SOLUTION INTRAMUSCULAR; INTRAVENOUS; SUBCUTANEOUS EVERY 5 MIN PRN
Status: DISCONTINUED | OUTPATIENT
Start: 2023-07-05 | End: 2023-07-05 | Stop reason: HOSPADM

## 2023-07-05 RX ORDER — PROPOFOL 10 MG/ML
INJECTION, EMULSION INTRAVENOUS CONTINUOUS PRN
Status: DISCONTINUED | OUTPATIENT
Start: 2023-07-05 | End: 2023-07-05

## 2023-07-05 RX ORDER — CEFAZOLIN SODIUM/WATER 3 G/30 ML
3 SYRINGE (ML) INTRAVENOUS
Status: COMPLETED | OUTPATIENT
Start: 2023-07-05 | End: 2023-07-05

## 2023-07-05 RX ORDER — FENTANYL CITRATE 50 UG/ML
25 INJECTION, SOLUTION INTRAMUSCULAR; INTRAVENOUS EVERY 5 MIN PRN
Status: DISCONTINUED | OUTPATIENT
Start: 2023-07-05 | End: 2023-07-05 | Stop reason: HOSPADM

## 2023-07-05 RX ORDER — ONDANSETRON 2 MG/ML
INJECTION INTRAMUSCULAR; INTRAVENOUS PRN
Status: DISCONTINUED | OUTPATIENT
Start: 2023-07-05 | End: 2023-07-05

## 2023-07-05 RX ORDER — FENTANYL CITRATE 50 UG/ML
50 INJECTION, SOLUTION INTRAMUSCULAR; INTRAVENOUS EVERY 5 MIN PRN
Status: DISCONTINUED | OUTPATIENT
Start: 2023-07-05 | End: 2023-07-05 | Stop reason: HOSPADM

## 2023-07-05 RX ORDER — ONDANSETRON 4 MG/1
4 TABLET, ORALLY DISINTEGRATING ORAL EVERY 30 MIN PRN
Status: DISCONTINUED | OUTPATIENT
Start: 2023-07-05 | End: 2023-07-05 | Stop reason: HOSPADM

## 2023-07-05 RX ORDER — HALOPERIDOL 5 MG/ML
1 INJECTION INTRAMUSCULAR
Status: DISCONTINUED | OUTPATIENT
Start: 2023-07-05 | End: 2023-07-05 | Stop reason: HOSPADM

## 2023-07-05 RX ORDER — DEXAMETHASONE SODIUM PHOSPHATE 10 MG/ML
INJECTION, SOLUTION INTRAMUSCULAR; INTRAVENOUS PRN
Status: DISCONTINUED | OUTPATIENT
Start: 2023-07-05 | End: 2023-07-05

## 2023-07-05 RX ORDER — ONDANSETRON 2 MG/ML
4 INJECTION INTRAMUSCULAR; INTRAVENOUS EVERY 30 MIN PRN
Status: DISCONTINUED | OUTPATIENT
Start: 2023-07-05 | End: 2023-07-05 | Stop reason: HOSPADM

## 2023-07-05 RX ORDER — LIDOCAINE 40 MG/G
CREAM TOPICAL
Status: DISCONTINUED | OUTPATIENT
Start: 2023-07-05 | End: 2023-07-05 | Stop reason: HOSPADM

## 2023-07-05 RX ORDER — PROPOFOL 10 MG/ML
INJECTION, EMULSION INTRAVENOUS PRN
Status: DISCONTINUED | OUTPATIENT
Start: 2023-07-05 | End: 2023-07-05

## 2023-07-05 RX ORDER — LIDOCAINE HYDROCHLORIDE 10 MG/ML
INJECTION, SOLUTION INFILTRATION; PERINEURAL PRN
Status: DISCONTINUED | OUTPATIENT
Start: 2023-07-05 | End: 2023-07-05

## 2023-07-05 RX ORDER — MAGNESIUM SULFATE 4 G/50ML
4 INJECTION INTRAVENOUS ONCE
Status: COMPLETED | OUTPATIENT
Start: 2023-07-05 | End: 2023-07-05

## 2023-07-05 RX ORDER — CEFAZOLIN SODIUM/WATER 3 G/30 ML
3 SYRINGE (ML) INTRAVENOUS SEE ADMIN INSTRUCTIONS
Status: DISCONTINUED | OUTPATIENT
Start: 2023-07-05 | End: 2023-07-05 | Stop reason: HOSPADM

## 2023-07-05 RX ORDER — NEOSTIGMINE METHYLSULFATE 1 MG/ML
VIAL (ML) INJECTION PRN
Status: DISCONTINUED | OUTPATIENT
Start: 2023-07-05 | End: 2023-07-05

## 2023-07-05 RX ORDER — OXYCODONE HYDROCHLORIDE 5 MG/1
5 TABLET ORAL
Status: COMPLETED | OUTPATIENT
Start: 2023-07-05 | End: 2023-07-05

## 2023-07-05 RX ORDER — FENTANYL CITRATE 50 UG/ML
INJECTION, SOLUTION INTRAMUSCULAR; INTRAVENOUS PRN
Status: DISCONTINUED | OUTPATIENT
Start: 2023-07-05 | End: 2023-07-05

## 2023-07-05 RX ORDER — OXYCODONE HYDROCHLORIDE 5 MG/1
5 TABLET ORAL
Status: DISCONTINUED | OUTPATIENT
Start: 2023-07-05 | End: 2023-07-05

## 2023-07-05 RX ORDER — OXYCODONE HYDROCHLORIDE 5 MG/1
5-10 TABLET ORAL EVERY 4 HOURS PRN
Qty: 12 TABLET | Refills: 0 | Status: SHIPPED | OUTPATIENT
Start: 2023-07-05 | End: 2023-07-14

## 2023-07-05 RX ORDER — HYDROMORPHONE HYDROCHLORIDE 1 MG/ML
0.2 INJECTION, SOLUTION INTRAMUSCULAR; INTRAVENOUS; SUBCUTANEOUS EVERY 5 MIN PRN
Status: DISCONTINUED | OUTPATIENT
Start: 2023-07-05 | End: 2023-07-05 | Stop reason: HOSPADM

## 2023-07-05 RX ORDER — ACETAMINOPHEN 325 MG/1
975 TABLET ORAL ONCE
Status: DISCONTINUED | OUTPATIENT
Start: 2023-07-05 | End: 2023-07-05

## 2023-07-05 RX ORDER — ACETAMINOPHEN 325 MG/1
975 TABLET ORAL ONCE
Status: COMPLETED | OUTPATIENT
Start: 2023-07-05 | End: 2023-07-05

## 2023-07-05 RX ORDER — OXYCODONE HYDROCHLORIDE 5 MG/1
10 TABLET ORAL
Status: DISCONTINUED | OUTPATIENT
Start: 2023-07-05 | End: 2023-07-05 | Stop reason: HOSPADM

## 2023-07-05 RX ORDER — GLYCOPYRROLATE 0.2 MG/ML
INJECTION, SOLUTION INTRAMUSCULAR; INTRAVENOUS PRN
Status: DISCONTINUED | OUTPATIENT
Start: 2023-07-05 | End: 2023-07-05

## 2023-07-05 RX ORDER — BUPIVACAINE HYDROCHLORIDE 2.5 MG/ML
INJECTION, SOLUTION INFILTRATION; PERINEURAL PRN
Status: DISCONTINUED | OUTPATIENT
Start: 2023-07-05 | End: 2023-07-05 | Stop reason: HOSPADM

## 2023-07-05 RX ADMIN — FENTANYL CITRATE 100 MCG: 50 INJECTION, SOLUTION INTRAMUSCULAR; INTRAVENOUS at 09:10

## 2023-07-05 RX ADMIN — ROCURONIUM BROMIDE 50 MG: 50 INJECTION, SOLUTION INTRAVENOUS at 09:12

## 2023-07-05 RX ADMIN — PROPOFOL 240 MG: 10 INJECTION, EMULSION INTRAVENOUS at 09:12

## 2023-07-05 RX ADMIN — DEXMEDETOMIDINE HYDROCHLORIDE 20 MCG: 100 INJECTION, SOLUTION INTRAVENOUS at 09:07

## 2023-07-05 RX ADMIN — SODIUM CHLORIDE, POTASSIUM CHLORIDE, SODIUM LACTATE AND CALCIUM CHLORIDE: 600; 310; 30; 20 INJECTION, SOLUTION INTRAVENOUS at 07:47

## 2023-07-05 RX ADMIN — OXYCODONE HYDROCHLORIDE 5 MG: 5 TABLET ORAL at 10:42

## 2023-07-05 RX ADMIN — GLYCOPYRROLATE 0.2 MG: 0.2 INJECTION INTRAMUSCULAR; INTRAVENOUS at 09:12

## 2023-07-05 RX ADMIN — DEXAMETHASONE SODIUM PHOSPHATE 4 MG: 10 INJECTION, SOLUTION INTRAMUSCULAR; INTRAVENOUS at 09:20

## 2023-07-05 RX ADMIN — LIDOCAINE HYDROCHLORIDE 50 MG: 10 INJECTION, SOLUTION INFILTRATION; PERINEURAL at 09:10

## 2023-07-05 RX ADMIN — ACETAMINOPHEN 975 MG: 325 TABLET ORAL at 07:55

## 2023-07-05 RX ADMIN — Medication 3 G: at 09:10

## 2023-07-05 RX ADMIN — PROPOFOL 25 MCG/KG/MIN: 10 INJECTION, EMULSION INTRAVENOUS at 09:12

## 2023-07-05 RX ADMIN — ONDANSETRON 4 MG: 2 INJECTION INTRAMUSCULAR; INTRAVENOUS at 09:20

## 2023-07-05 RX ADMIN — MAGNESIUM SULFATE HEPTAHYDRATE 4 G: 80 INJECTION, SOLUTION INTRAVENOUS at 09:04

## 2023-07-05 RX ADMIN — FENTANYL CITRATE 50 MCG: 50 INJECTION, SOLUTION INTRAMUSCULAR; INTRAVENOUS at 09:32

## 2023-07-05 RX ADMIN — MIDAZOLAM 2 MG: 1 INJECTION INTRAMUSCULAR; INTRAVENOUS at 09:04

## 2023-07-05 RX ADMIN — PROPOFOL 40 MG: 10 INJECTION, EMULSION INTRAVENOUS at 09:14

## 2023-07-05 RX ADMIN — NEOSTIGMINE METHYLSULFATE 5 MG: 1 INJECTION, SOLUTION INTRAVENOUS at 09:58

## 2023-07-05 RX ADMIN — GLYCOPYRROLATE 0.8 MG: 0.2 INJECTION INTRAMUSCULAR; INTRAVENOUS at 09:58

## 2023-07-05 ASSESSMENT — ACTIVITIES OF DAILY LIVING (ADL)
ADLS_ACUITY_SCORE: 35
ADLS_ACUITY_SCORE: 35

## 2023-07-05 NOTE — ANESTHESIA PREPROCEDURE EVALUATION
Anesthesia Pre-Procedure Evaluation    Patient: Bob Neely Jr.   MRN: [id number] : 1970        Procedure : Procedure(s):  LAPAROSCOPY, REMOVAL OF GASTRIC BAND          Past Medical History:   Diagnosis Date     Anxiety     Previously on Zoloft     Diabetes (H)      Gastroesophageal reflux disease      HLD (hyperlipidemia)      Hypertension      Obese      Psoriasis       Past Surgical History:   Procedure Laterality Date     APPENDECTOMY      Childhood     LAPAROSCOPIC GASTRIC BANDING      Laparosc Restrictive Proc Adjustable Gastric Band Placement; LAP BAND; was over 440      No Known Allergies   Social History     Tobacco Use     Smoking status: Never     Smokeless tobacco: Never   Substance Use Topics     Alcohol use: Yes     Comment: Alcoholic Drinks/day: Rare      Wt Readings from Last 1 Encounters:   23 120.3 kg (265 lb 3.2 oz)        Anesthesia Evaluation   Pt has had prior anesthetic.     No history of anesthetic complications       ROS/MED HX  ENT/Pulmonary:  - neg pulmonary ROS     Neurologic:  - neg neurologic ROS     Cardiovascular:     (+) Dyslipidemia hypertension-----    METS/Exercise Tolerance: >4 METS    Hematologic:  - neg hematologic  ROS     Musculoskeletal: Comment: Psoriasis      GI/Hepatic:     (+) GERD, Asymptomatic on medication,     Renal/Genitourinary:  - neg Renal ROS     Endo:     (+) type II DM, Not using insulin, Obesity (BMI 31),     Psychiatric/Substance Use:     (+) psychiatric history anxiety     Infectious Disease:       Malignancy:       Other:            Physical Exam    Airway        Mallampati: I   TM distance: > 3 FB   Neck ROM: full   Mouth opening: > 3 cm    Respiratory Devices and Support         Dental     Comment: Good        Cardiovascular   cardiovascular exam normal          Pulmonary   pulmonary exam normal                OUTSIDE LABS:  CBC:   Lab Results   Component Value Date    WBC 5.0 2023    HGB 14.0 2023    HCT 39.9 (L)  06/26/2023     06/26/2023     BMP:   Lab Results   Component Value Date     06/26/2023     05/01/2023    POTASSIUM 4.3 06/26/2023    POTASSIUM 4.8 05/01/2023    CHLORIDE 101 06/26/2023    CHLORIDE 102 05/01/2023    CO2 26 06/26/2023    CO2 25 05/01/2023    BUN 12.7 06/26/2023    BUN 11.7 05/01/2023    CR 1.19 (H) 06/26/2023    CR 1.19 (H) 05/01/2023     (H) 06/26/2023     (H) 05/01/2023     COAGS: No results found for: PTT, INR, FIBR  POC: No results found for: BGM, HCG, HCGS  HEPATIC:   Lab Results   Component Value Date    ALBUMIN 4.1 12/05/2022    PROTTOTAL 6.4 12/05/2022    ALT 26 12/05/2022    AST 24 12/05/2022    ALKPHOS 57 12/05/2022    BILITOTAL 0.5 12/05/2022     OTHER:   Lab Results   Component Value Date    A1C 7.8 (H) 05/01/2023    SHARON 9.7 06/26/2023       Anesthesia Plan    ASA Status:  2   NPO Status:  NPO Appropriate    Anesthesia Type: General.   Induction: Intravenous, Propofol.   Maintenance: Balanced.        Consents    Anesthesia Plan(s) and associated risks, benefits, and realistic alternatives discussed. Questions answered and patient/representative(s) expressed understanding.     - Discussed: Risks, Benefits and Alternatives for BOTH SEDATION and the PROCEDURE were discussed     - Discussed with:  Patient      - Extended Intubation/Ventilatory Support Discussed: No.      - Patient is DNR/DNI Status: No    Use of blood products discussed: Yes.     - Discussed with: Patient.     - Consented: consented to blood products            Reason for refusal: other.     Postoperative Care    Pain management: Multi-modal analgesia.   PONV prophylaxis: Ondansetron (or other 5HT-3)     Comments:    Other Comments: Background propofol and precedex    Acetaminophen   Magnesium    Reverse with Sugammadex            Ridge Aquino MD

## 2023-07-05 NOTE — ANESTHESIA CARE TRANSFER NOTE
Patient: Bob Neely Jr.    Procedure: Procedure(s):  LAPAROSCOPY, REMOVAL OF GASTRIC BAND       Diagnosis: History of laparoscopic adjustable gastric banding [Z98.84]  Diagnosis Additional Information: No value filed.    Anesthesia Type:   General     Note:    Oropharynx: oropharynx clear of all foreign objects and spontaneously breathing  Level of Consciousness: awake and drowsy  Oxygen Supplementation: face mask  Level of Supplemental Oxygen (L/min / FiO2): 8  Independent Airway: airway patency satisfactory and stable  Dentition: dentition unchanged  Vital Signs Stable: post-procedure vital signs reviewed and stable  Report to RN Given: handoff report given  Patient transferred to: PACU  Comments: Volatile agents turned off, muscle relaxant reversed, 4/4 twitches with sustained tetany. Pt breathing spontaneously with adequate tidal volumes, following commands, gently suctioned oropharynx, extubated without issue. 10LPM O2 applied via face mask.Transported by CRNA and RN to recovery.    Handoff Report: Identifed the Patient, Identified the Reponsible Provider, Reviewed the pertinent medical history, Discussed the surgical course, Reviewed Intra-OP anesthesia mangement and issues during anesthesia, Set expectations for post-procedure period and Allowed opportunity for questions and acknowledgement of understanding      Vitals:  Vitals Value Taken Time   /68 07/05/23 1009   Temp 36.4  C (97.6  F) 07/05/23 1009   Pulse 77 07/05/23 1011   Resp 22 07/05/23 1011   SpO2 100 % 07/05/23 1011   Vitals shown include unvalidated device data.    Electronically Signed By: ASIM Hemphill CRNA  July 5, 2023  10:13 AM

## 2023-07-05 NOTE — INTERVAL H&P NOTE
"I have reviewed the surgical (or preoperative) H&P that is linked to this encounter, and examined the patient. There are no significant changes    Hernesto Alan MD, [facility]  Office: [phone]  [facility]   General and Bariatric Surgery      Clinical Conditions Present on Arrival:  Clinically Significant Risk Factors Present on Admission                  # DMII: A1C = 7.8 % (Ref range: 0.0 - 5.6 %) within past 6 months  # Obesity: Estimated body mass index is 30.65 kg/m  as calculated from the following:    Height as of this encounter: 1.981 m (6' 6\").    Weight as of this encounter: 120.3 kg (265 lb 3.2 oz).       "

## 2023-07-05 NOTE — ANESTHESIA PROCEDURE NOTES
Airway       Patient location during procedure: OR       Procedure Start/Stop Times: 7/5/2023 9:16 AM  Staff -        Anesthesiologist:  Ridge Aquino MD       CRNA: Michael Driver APRN CRNA       Other Anesthesia Staff: Rommel Cano       Performed By: SRNA  Consent for Airway        Urgency: elective  Indications and Patient Condition       Indications for airway management: hebert-procedural       Induction type:intravenous       Mask difficulty assessment: 1 - vent by mask    Final Airway Details       Final airway type: endotracheal airway       Successful airway: ETT - single  Endotracheal Airway Details        ETT size (mm): 8.0       Cuffed: yes       Successful intubation technique: direct laryngoscopy       DL Blade Type: Obrien 3       Grade View of Cords: 1       Adjucts: stylet       Position: Right       Measured from: lips       Secured at (cm): 24       Bite block used: Soft (at end of case)    Post intubation assessment        Placement verified by: capnometry, equal breath sounds and chest rise        Number of attempts at approach: 2 (additional anesthetic needed, 1st attempt aborted)       Secured with: silk tape       Ease of procedure: easy       Dentition: Intact and Unchanged       Dental guard used and removed.    Medication(s) Administered   Medication Administration Time: 7/5/2023 9:16 AM

## 2023-07-05 NOTE — ANESTHESIA POSTPROCEDURE EVALUATION
Patient: Bob Neely Jr.    Procedure: Procedure(s):  LAPAROSCOPY, REMOVAL OF GASTRIC BAND       Anesthesia Type:  General    Note:  Disposition: Outpatient   Postop Pain Control: Uneventful            Sign Out: Well controlled pain   PONV: No   Neuro/Psych: Uneventful            Sign Out: Acceptable/Baseline neuro status   Airway/Respiratory: Uneventful            Sign Out: Acceptable/Baseline resp. status   CV/Hemodynamics: Uneventful            Sign Out: Acceptable CV status; No obvious hypovolemia; No obvious fluid overload   Other NRE: NONE   DID A NON-ROUTINE EVENT OCCUR? No           Last vitals:  Vitals Value Taken Time   /67 07/05/23 1030   Temp 36.1  C (97  F) 07/05/23 1030   Pulse 62 07/05/23 1039   Resp 14 07/05/23 1039   SpO2 98 % 07/05/23 1039   Vitals shown include unvalidated device data.    Electronically Signed By: Ridge Aquino MD  July 5, 2023  10:57 AM

## 2023-07-05 NOTE — OP NOTE
[facility]  Operative Note    Pre-operative diagnosis: History of laparoscopic adjustable gastric banding [Z98.84]   Post-operative diagnosis Adjustable gastric band in place   Procedure: Procedure(s):  LAPAROSCOPY, REMOVAL OF GASTRIC BAND   Surgeon: Hernesto Alan MD   Assistants(s):  Bety Corona CNP.  Her assistance was necessary for retraction and operation of the laparoscope   Anesthesia: General    Estimated blood loss: Less than 50 ml    Total IV fluids: (See anesthesia record)   Blood transfusion: No transfusion was given during surgery   Total urine output: (See anesthesia record)   Drains: None   Specimens:  Adjustable gastric band in 2 pieces   Implants: None   Findings: None.   Complications: None.   Condition: Stable       Description of procedure:  The patient was brought to the operating room where after induction of general anesthesia with endotracheal intubation he was positioned with both arms out.  He was then prepped and draped in sterile fashion.  After procedural timeout, we began by accessing the abdomen with an optical trocar in the right upper quadrant.  After insufflation 3 additional trocars were then placed across the mid abdomen.  He was put into reverse Trendelenburg body positioning.  Adhesions between the anterior abdominal wall, omentum were taken down.  Adhesions between the omentum, stomach, and left lobe liver were then divided with electrocautery.  We then proceeded to divide through the capsule that had formed around the adjustable gastric band, partially taking down this capsule anterior to the stomach and band extending to the left upper quadrant.  Once able to clearly identify the buckle on the band, this was released.  The tube for the band was divided sharply allowing us to fully unwrap the adjustable gastric band and remove it from around the stomach, pulling it out through the 12 mm trocar in the right mid abdomen.  We then reassessed the  abdominal cavity for bleeding, none of which was encountered.  The trocars were then removed and insufflation released.  The incision in the left upper quadrant overlying the port site was extended.  Utilize electrocautery we then dissected down to the underlying port which was then circumferentially  from the surrounding subcutaneous fatty tissues.  The port and adherent to capsule were then excised with electrocautery.  There was no violation of the anterior abdominal wall fascia.  The skin incisions were then closed with interrupted and running 4-0 Vicryl sutures.    Hernesto Alan MD, FACS  [phone]  [facility]  General and Bariatric Surgery

## 2023-07-10 LAB
PATH REPORT.COMMENTS IMP SPEC: NORMAL
PATH REPORT.COMMENTS IMP SPEC: NORMAL
PATH REPORT.FINAL DX SPEC: NORMAL
PATH REPORT.GROSS SPEC: NORMAL
PATH REPORT.MICROSCOPIC SPEC OTHER STN: NORMAL
PATH REPORT.RELEVANT HX SPEC: NORMAL
PHOTO IMAGE: NORMAL

## 2023-07-14 ENCOUNTER — VIRTUAL VISIT (OUTPATIENT)
Dept: SURGERY | Facility: CLINIC | Age: 53
End: 2023-07-14
Payer: COMMERCIAL

## 2023-07-14 DIAGNOSIS — Z98.890 POST-OPERATIVE STATE: Primary | ICD-10-CM

## 2023-07-14 PROCEDURE — 99024 POSTOP FOLLOW-UP VISIT: CPT | Mod: VID | Performed by: SURGERY

## 2023-07-14 NOTE — PROGRESS NOTES
Bob DAN Chin Marks is 53 year old  male who presents for a billable video visit today.    How would you like to obtain your AVS? MyChart  If dropped from the video visit, the video invitation should be resent by: Text to cell phone: [phone]  Will anyone else be joining your video visit? No      Video Start Time: 8:19 AM    Are there any specific questions or needs that you would like addressed at your visit today? Things are going great per patient.    Provider Notes:   HPI: Pt is here for follow up of a laparoscopic removal of an adjustable gastric band. He is doing well. Taking po well, estimating he is getting in plenty of fluid. No vomiting.  No pain with drinking or eating purees. No fevers or chills. Ambulating without problems.     Current Outpatient Medications   Medication Sig Dispense Refill     blood glucose (ACCU-CHEK GUIDE) test strip Use to test blood sugar 2 times daily or as directed. 200 strip 3     blood glucose monitoring (ACCU-CHEK FASTCLIX) lancets Use to test blood sugar 2 times daily or as directed. 200 each 3     lisinopril (ZESTRIL) 20 MG tablet Take 1 tablet (20 mg) by mouth daily 90 tablet 3     metFORMIN (GLUCOPHAGE) 1000 MG tablet [METFORMIN (GLUCOPHAGE) 1000 MG TABLET] TAKE 1 TABLET BY MOUTH TWICE A DAY WITH FOOD 180 tablet 3     simvastatin (ZOCOR) 40 MG tablet TAKE 1 TABLET BY MOUTH EVERY DAY 90 tablet 2     tadalafil (CIALIS) 20 MG tablet TAKE HALF A TAB TO 1 FULL TAB BY MOUTH DAILY AS NEEDED 8 tablet 5     tirzepatide (MOUNJARO) 12.5 MG/0.5ML pen Inject 12.5 mg Subcutaneous every 7 days 2 mL 1     tirzepatide (MOUNJARO) 15 MG/0.5ML pen Inject 15 mg Subcutaneous every 7 days 6 mL 1         There were no vitals taken for this visit.  Wt Readings from Last 4 Encounters:   07/05/23 120.3 kg (265 lb 3.2 oz)   06/26/23 122.5 kg (270 lb)   05/01/23 127 kg (280 lb)   04/20/23 128.8 kg (284 lb)         There is no height or weight on file to calculate BMI.    EXAM:  GENERAL:Appears  well  ABDOMEN: Incisions healing well      Assessment/Plan: Pt s/p removal of an adjustable gastric band. Doing well. Diet and activity discussed. He will [facility] in 1 month to meet with our dietician, and again at 3 months for additional follow up.    Hernesto Alan MD, FACS  Office: [phone]  [facility]   General and Bariatric Surgery      Video-Visit Details    Type of service:  Video Visit    Platform used for Video Visit: Common Sensing    Video End Time (time video stopped): 8:26 AM    Originating Location (pt. Location): Home        Distant Location (provider location):  On-site    Distant Location (provider location):  [facility]

## 2023-07-14 NOTE — LETTER
7/14/2023         RE: Bob Neely Jr.  [address]  [facility] [address]        Dear Colleague,    Thank you for referring your patient, Bob Neely Jr., to the [facility] [place]. Please see a copy of my visit note below.    Bob Neely Jr. is 53 year old  male who presents for a billable video visit today.    How would you like to obtain your AVS? MyChart  If dropped from the video visit, the video invitation should be resent by: Text to cell phone: [phone]  Will anyone else be joining your video visit? No      Video Start Time: 8:19 AM    Are there any specific questions or needs that you would like addressed at your visit today? Things are going great per patient.    Provider Notes:   HPI: Pt is here for follow up of a laparoscopic removal of an adjustable gastric band. He is doing well. Taking po well, estimating he is getting in plenty of fluid. No vomiting.  No pain with drinking or eating purees. No fevers or chills. Ambulating without problems.     Current Outpatient Medications   Medication Sig Dispense Refill     blood glucose (ACCU-CHEK GUIDE) test strip Use to test blood sugar 2 times daily or as directed. 200 strip 3     blood glucose monitoring (ACCU-CHEK FASTCLIX) lancets Use to test blood sugar 2 times daily or as directed. 200 each 3     lisinopril (ZESTRIL) 20 MG tablet Take 1 tablet (20 mg) by mouth daily 90 tablet 3     metFORMIN (GLUCOPHAGE) 1000 MG tablet [METFORMIN (GLUCOPHAGE) 1000 MG TABLET] TAKE 1 TABLET BY MOUTH TWICE A DAY WITH FOOD 180 tablet 3     simvastatin (ZOCOR) 40 MG tablet TAKE 1 TABLET BY MOUTH EVERY DAY 90 tablet 2     tadalafil (CIALIS) 20 MG tablet TAKE HALF A TAB TO 1 FULL TAB BY MOUTH DAILY AS NEEDED 8 tablet 5     tirzepatide (MOUNJARO) 12.5 MG/0.5ML pen Inject 12.5 mg Subcutaneous every 7 days 2 mL 1     tirzepatide (MOUNJARO) 15 MG/0.5ML pen Inject 15 mg Subcutaneous every 7 days 6 mL 1          There were no vitals taken for this visit.  Wt Readings from Last 4 Encounters:   07/05/23 120.3 kg (265 lb 3.2 oz)   06/26/23 122.5 kg (270 lb)   05/01/23 127 kg (280 lb)   04/20/23 128.8 kg (284 lb)         There is no height or weight on file to calculate BMI.    EXAM:  GENERAL:Appears well  ABDOMEN: Incisions healing well      Assessment/Plan: Pt s/p removal of an adjustable gastric band. Doing well. Diet and activity discussed. He will [facility] 1 month to meet with our dietician, and again at 3 months for additional follow up.    Hernesto Alan MD, FACS  Office: [phone]  [facility]   General and Bariatric Surgery      Video-Visit Details    Type of service:  Video Visit    Platform used for Video Visit: Hard 8 Games    Video End Time (time video stopped): 8:26 AM    Originating Location (pt. Location): Home        Distant Location (provider location):  On-site    Distant Location (provider location):  [facility]ATR[facility] [place]       Again, thank you for allowing me to participate in the care of your patient.        Sincerely,        Hernesto Alan MD

## 2023-08-12 ENCOUNTER — HEALTH MAINTENANCE LETTER (OUTPATIENT)
Age: 53
End: 2023-08-12

## 2023-08-25 NOTE — PROGRESS NOTES
Medication Therapy Management (MTM) Encounter    ASSESSMENT:                            1. Type 2 diabetes mellitus   A1c improved to 7.6%, but still above goal <7% per ADA guidelines. On maximum dose metformin and Mounjaro. Recommend addition of SGLT-2 inhibitor. Medication education and counseling was provided, including indication, expected effect, dosing instructions, and possible side effects. The patient's questions were answered.     2. Hypertension  Blood pressure is well controlled and meeting goal of <130/80 mm Hg per ACC/AHA hypertension guidelines.    3. Hyperlipidemia  Appropriately on a moderate intensity statin given age and diabetes diagnosis per ACC/AHA guidelines.     PLAN:                            Check CMP, A1c.     Start Jardiance 10 mg every morning.       Follow-up: Return in about 1 month (around 9/28/2023).    SUBJECTIVE/OBJECTIVE:                          Bob Neely is a 53 year old male coming in a follow up visit. He was referred to me from Andres Gagnon. This is a follow up from 5/1/23.      Reason for visit: diabetes/Mounjaro follow up    Allergies/ADRs: Reviewed in chart  Past Medical History: Reviewed in chart  Tobacco: He reports that he has never smoked. He has never used smokeless tobacco.  Alcohol: yes, rarely    Medication Adherence/Access: no issues reported. Says he never forgets a dose. Takes medications twice daily. Uses a pillbox.     1. Type 2 diabetes mellitus  Currently taking metformin 1000 mg twice daily and Mounjaro 15 mg subcutaneous weekly. Last year, we stopped Ozempic and replaced with Mounjaro as he was no longer noticing appetite suppression and was gaining back weight. Blood sugars were also still high. Tolerating Mounjaro well. He is noticing appetite supression. Fasting blood sugars are still above goal. Usually checking fasting in the morning or between meals.   AM: 160-180s  Post-prandial: 130-140  Symptoms of low/high blood sugar?  none  Diet/Exercise: Follows good diabetic diet and is physically active; eats low carb. July 5th got lap band removed as he had trouble eating protein. Feeling better.   Aspirin: Not taking due to age/low risk  Statin: Yes: simvastatin    ACEi/ARB: Yes: lisinopril    Hemoglobin A1C   Date Value Ref Range Status   08/28/2023 7.6 (H) 0.0 - 5.6 % Final     Comment:     Normal <5.7%   Prediabetes 5.7-6.4%    Diabetes 6.5% or higher     Note: Adopted from ADA consensus guidelines.   05/01/2023 7.8 (H) 0.0 - 5.6 % Final     Comment:     Normal <5.7%   Prediabetes 5.7-6.4%    Diabetes 6.5% or higher     Note: Adopted from ADA consensus guidelines.   12/05/2022 9.2 (H) 0.0 - 5.6 % Final     Comment:     Normal <5.7%   Prediabetes 5.7-6.4%    Diabetes 6.5% or higher     Note: Adopted from ADA consensus guidelines.      Microalbumin Urine mg/dL   Date Value Ref Range Status   08/20/2021 1.68 0.00 - 1.99 mg/dL Final      Creatinine Urine mg/dL   Date Value Ref Range Status   12/05/2022 520.0 mg/dL Final     Comment:     The reference ranges have not been established in urine creatinine. The results should be integrated into the clinical context for interpretation.   08/20/2021 242 mg/dL Final     LDL Cholesterol Calculated   Date Value Ref Range Status   06/26/2023 41 <=100 mg/dL Final     Creatinine   Date Value Ref Range Status   06/26/2023 1.19 (H) 0.67 - 1.17 mg/dL Final     2. Hypertension  Current medications include lisinopril 20 mg daily.  Patient does not self-monitor blood pressure.  Patient reports no current medication side effects.     BP Readings from Last 3 Encounters:   08/28/23 124/64   07/05/23 108/61   06/26/23 132/78     3. Hyperlipidemia  Current therapy includes simvastatin 40 mg every evening.  Patient reports no significant myalgias or other side effects.    The ASCVD Risk score (Davina DK, et al., 2019) failed to calculate for the following reasons:    The valid total cholesterol range is 130 to 320  mg/dL     Recent Labs   Lab Test 12/05/22  0848 04/22/22  0934   CHOL 152 112   HDL 57 36*   LDL 67 47   TRIG 140 144       Vitals:   BP Readings from Last 3 Encounters:   08/28/23 124/64   07/05/23 108/61   06/26/23 132/78      Pulse Readings from Last 3 Encounters:   07/05/23 60   06/26/23 72   05/01/23 76     Wt Readings from Last 3 Encounters:   08/28/23 282 lb 3.2 oz (128 kg)   07/05/23 265 lb 3.2 oz (120.3 kg)   06/26/23 270 lb (122.5 kg)     ----------------    I spent 25 minutes with this patient today. All changes were made via collaborative practice agreement with Andres Dc MD. A copy of the visit note was provided to the patient's provider(s).    The patient was given a summary of these recommendations via [employer].     Mayda Dupree, PharmD, BCACP  Medication Management (MTM) Pharmacist  [facility]        Medication Therapy Recommendations  Type 2 diabetes mellitus (H)    Current Medication: empagliflozin (JARDIANCE) 10 MG TABS tablet   Rationale: Synergistic therapy - Needs additional medication therapy - Indication   Recommendation: Start Medication   Status: Accepted per [facility]          Current Medication: tirzepatide (MOUNJARO) 15 MG/0.5ML pen   Rationale: Medication requires monitoring - Needs additional monitoring - Effectiveness   Recommendation: Order Lab   Status: Accepted per CPA

## 2023-08-28 ENCOUNTER — LAB (OUTPATIENT)
Dept: LAB | Facility: CLINIC | Age: 53
End: 2023-08-28
Payer: COMMERCIAL

## 2023-08-28 ENCOUNTER — OFFICE VISIT (OUTPATIENT)
Dept: PHARMACY | Facility: CLINIC | Age: 53
End: 2023-08-28
Payer: COMMERCIAL

## 2023-08-28 VITALS — WEIGHT: 282.2 LBS | SYSTOLIC BLOOD PRESSURE: 124 MMHG | BODY MASS INDEX: 32.61 KG/M2 | DIASTOLIC BLOOD PRESSURE: 64 MMHG

## 2023-08-28 DIAGNOSIS — E11.69 TYPE 2 DIABETES MELLITUS WITH OTHER SPECIFIED COMPLICATION, WITHOUT LONG-TERM CURRENT USE OF INSULIN (H): ICD-10-CM

## 2023-08-28 DIAGNOSIS — I10 ESSENTIAL HYPERTENSION: ICD-10-CM

## 2023-08-28 DIAGNOSIS — E11.69 TYPE 2 DIABETES MELLITUS WITH OTHER SPECIFIED COMPLICATION, WITHOUT LONG-TERM CURRENT USE OF INSULIN (H): Primary | ICD-10-CM

## 2023-08-28 DIAGNOSIS — E78.49 OTHER HYPERLIPIDEMIA: ICD-10-CM

## 2023-08-28 LAB
ANION GAP SERPL CALCULATED.3IONS-SCNC: 10 MMOL/L (ref 7–15)
BUN SERPL-MCNC: 9.8 MG/DL (ref 6–20)
CALCIUM SERPL-MCNC: 9 MG/DL (ref 8.6–10)
CHLORIDE SERPL-SCNC: 102 MMOL/L (ref 98–107)
CREAT SERPL-MCNC: 1.14 MG/DL (ref 0.67–1.17)
DEPRECATED HCO3 PLAS-SCNC: 26 MMOL/L (ref 22–29)
GFR SERPL CREATININE-BSD FRML MDRD: 76 ML/MIN/1.73M2
GLUCOSE SERPL-MCNC: 203 MG/DL (ref 70–99)
HBA1C MFR BLD: 7.6 % (ref 0–5.6)
POTASSIUM SERPL-SCNC: 4.7 MMOL/L (ref 3.4–5.3)
SODIUM SERPL-SCNC: 138 MMOL/L (ref 136–145)

## 2023-08-28 PROCEDURE — 83036 HEMOGLOBIN GLYCOSYLATED A1C: CPT

## 2023-08-28 PROCEDURE — 99606 MTMS BY PHARM EST 15 MIN: CPT | Performed by: PHARMACIST

## 2023-08-28 PROCEDURE — 99607 MTMS BY PHARM ADDL 15 MIN: CPT | Performed by: PHARMACIST

## 2023-08-28 PROCEDURE — 80048 BASIC METABOLIC PNL TOTAL CA: CPT

## 2023-08-28 PROCEDURE — 36415 COLL VENOUS BLD VENIPUNCTURE: CPT

## 2023-08-28 NOTE — PATIENT INSTRUCTIONS
"Recommendations from today's Medication Management (MTM) visit:                                                      Start Jardiance 10 mg every morning.     To access the Jardiance savings card, go to this website:   [email or web address]    To access the Mounjaro savings card, go to this website:   [email or web address]         To schedule another MTM appointment, please call the MTM scheduling line at [phone] or toll-free at [phone].     My MTM pharmacist's contact information:      Please feel free to contact me with any questions or concerns you have via TenderTree or calling the clinic.       Mayda Dupree, PharmD, [facility]CP  Medication Management (MTM) Pharmacist  [facility]     It was great speaking with you today.  I value your experience and would be very thankful for your time in providing feedback in our clinic survey. In the next few days, you may receive an email or text message from [employer] with a link to a survey related to your  clinical pharmacist.\"     "

## 2023-09-22 DIAGNOSIS — E11.69 TYPE 2 DIABETES MELLITUS WITH OTHER SPECIFIED COMPLICATION, WITHOUT LONG-TERM CURRENT USE OF INSULIN (H): ICD-10-CM

## 2023-10-02 NOTE — PROGRESS NOTES
Medication Therapy Management (MTM) Encounter    ASSESSMENT:                            1. Type 2 diabetes mellitus   Most recent A1c improved to 7.6%, but still above goal <7% per ADA guidelines. Have since added SGLT-2 inhibitor. Some notable increased urination, but improving. Patient agreeable to increase dose if BMP within normal range. Discussed optimal lifestyle interventions to help control blood sugars, including healthy diet and physical activity. Advised to eat a diet low in carbohydrates with increased vegetables, healthy fat, and protein.  Flu vaccine today. We do not have Moderna Covid booster so patient will find this at a local pharmacy instead.     2. Hypertension  Blood pressure is well controlled and meeting goal of <130/80 mm Hg per ACC/AHA hypertension guidelines.    3. Hyperlipidemia  Appropriately on a moderate intensity statin given age and diabetes diagnosis per ACC/AHA guidelines.     PLAN:                            Check BMP.    If within normal range, increase to Jardiance 25 mg every morning.     Flu vaccine today.     Follow-up: Return in about 1 month (around 11/4/2023).    SUBJECTIVE/OBJECTIVE:                          Bob Neely is a 53 year old male coming in a follow up visit. He was referred to me from Andres Gagnon. This is a follow up from 8/28/23.      Reason for visit: Jardiance follow up    Allergies/ADRs: Reviewed in chart  Past Medical History: Reviewed in chart  Tobacco: He reports that he has never smoked. He has never used smokeless tobacco.  Alcohol: yes, rarely    Medication Adherence/Access: no issues reported. Says he never forgets a dose. Takes medications twice daily. Uses a pillbox.     1. Type 2 diabetes mellitus  Currently taking metformin 1000 mg twice daily, Jardiance 10 mg every morning, and Mounjaro 15 mg subcutaneous weekly. At our last visit, we added Jardiance as A1c still above goal. Noticing increased urination, waking him up throughout the  night, but has been improving and tolerable. Drinking more water. Blood sugars are also a bit higher in the morning. Has also gained some weight. Previously, last year, we stopped Ozempic and replaced with Mounjaro as he was no longer noticing appetite suppression and was gaining back weight. Tolerating Mounjaro well. He is noticing some appetite suppression, but effect is waning a bit. Fasting blood sugars are still above goal. Usually checking fasting in the morning or between meals.   AM: 170-200 - has gone up from 150-160s  Post-prandial: 130-140  Symptoms of low/high blood sugar? none  Diet/Exercise: Follows good diabetic diet and is physically active; eats low carb. Has indoor sport court and walks a lot. July 5th got lap band removed as he had trouble eating protein. Feeling much better and stronger.   Aspirin: Not taking due to age/low risk  Statin: Yes: simvastatin    ACEi/ARB: Yes: lisinopril  Eye exam is up to date  Urine Albumin:   Lab Results   Component Value Date    UMALCR 6.69 12/05/2022      Lab Results   Component Value Date    A1C 7.6 (H) 08/28/2023     2. Hypertension  Current medications include lisinopril 20 mg daily.  Patient does not self-monitor blood pressure.  Patient reports no current medication side effects.     BP Readings from Last 3 Encounters:   10/04/23 120/66   08/28/23 124/64   07/05/23 108/61     3. Hyperlipidemia  Current therapy includes simvastatin 40 mg every evening.  Patient reports no significant myalgias or other side effects.    The ASCVD Risk score (Davina DK, et al., 2019) failed to calculate for the following reasons:    The valid total cholesterol range is 130 to 320 mg/dL     Recent Labs   Lab Test 12/05/22  0848 04/22/22  0934   CHOL 152 112   HDL 57 36*   LDL 67 47   TRIG 140 144       Vitals:   BP Readings from Last 3 Encounters:   10/04/23 120/66   08/28/23 124/64   07/05/23 108/61      Pulse Readings from Last 3 Encounters:   10/04/23 70   07/05/23 60    06/26/23 72     Wt Readings from Last 3 Encounters:   10/04/23 289 lb (131.1 kg)   08/28/23 282 lb 3.2 oz (128 kg)   07/05/23 265 lb 3.2 oz (120.3 kg)     ----------------    I spent 25 minutes with this patient today. All changes were made via collaborative practice agreement with Andres Dc MD. A copy of the visit note was provided to the patient's provider(s).    The patient was given a summary of these recommendations via Latio.     Mayda Dupree, PharmD, BCACP  Medication Management (MTM) Pharmacist  [facility]        Medication Therapy Recommendations  Type 2 diabetes mellitus (H)    Current Medication: empagliflozin (JARDIANCE) 10 MG TABS tablet   Rationale: Medication requires monitoring - Needs additional monitoring - Safety   Recommendation: Order Lab   Status: Accepted per CPA

## 2023-10-04 ENCOUNTER — LAB (OUTPATIENT)
Dept: LAB | Facility: CLINIC | Age: 53
End: 2023-10-04
Payer: COMMERCIAL

## 2023-10-04 ENCOUNTER — ALLIED HEALTH/NURSE VISIT (OUTPATIENT)
Dept: FAMILY MEDICINE | Facility: CLINIC | Age: 53
End: 2023-10-04
Payer: COMMERCIAL

## 2023-10-04 ENCOUNTER — OFFICE VISIT (OUTPATIENT)
Dept: PHARMACY | Facility: CLINIC | Age: 53
End: 2023-10-04
Payer: COMMERCIAL

## 2023-10-04 VITALS
HEART RATE: 70 BPM | WEIGHT: 289 LBS | DIASTOLIC BLOOD PRESSURE: 66 MMHG | BODY MASS INDEX: 33.4 KG/M2 | SYSTOLIC BLOOD PRESSURE: 120 MMHG

## 2023-10-04 DIAGNOSIS — Z23 NEED FOR INFLUENZA VACCINATION: Primary | ICD-10-CM

## 2023-10-04 DIAGNOSIS — I10 ESSENTIAL HYPERTENSION: ICD-10-CM

## 2023-10-04 DIAGNOSIS — E78.49 OTHER HYPERLIPIDEMIA: ICD-10-CM

## 2023-10-04 DIAGNOSIS — E11.69 TYPE 2 DIABETES MELLITUS WITH OTHER SPECIFIED COMPLICATION, WITHOUT LONG-TERM CURRENT USE OF INSULIN (H): ICD-10-CM

## 2023-10-04 DIAGNOSIS — E11.69 TYPE 2 DIABETES MELLITUS WITH OTHER SPECIFIED COMPLICATION, WITHOUT LONG-TERM CURRENT USE OF INSULIN (H): Primary | ICD-10-CM

## 2023-10-04 LAB
ANION GAP SERPL CALCULATED.3IONS-SCNC: 9 MMOL/L (ref 7–15)
BUN SERPL-MCNC: 10.6 MG/DL (ref 6–20)
CALCIUM SERPL-MCNC: 9.7 MG/DL (ref 8.6–10)
CHLORIDE SERPL-SCNC: 101 MMOL/L (ref 98–107)
CREAT SERPL-MCNC: 1.18 MG/DL (ref 0.67–1.17)
DEPRECATED HCO3 PLAS-SCNC: 28 MMOL/L (ref 22–29)
EGFRCR SERPLBLD CKD-EPI 2021: 73 ML/MIN/1.73M2
GLUCOSE SERPL-MCNC: 176 MG/DL (ref 70–99)
POTASSIUM SERPL-SCNC: 4.8 MMOL/L (ref 3.4–5.3)
SODIUM SERPL-SCNC: 138 MMOL/L (ref 135–145)

## 2023-10-04 PROCEDURE — 90682 RIV4 VACC RECOMBINANT DNA IM: CPT

## 2023-10-04 PROCEDURE — 99607 MTMS BY PHARM ADDL 15 MIN: CPT | Performed by: PHARMACIST

## 2023-10-04 PROCEDURE — 80048 BASIC METABOLIC PNL TOTAL CA: CPT

## 2023-10-04 PROCEDURE — 36415 COLL VENOUS BLD VENIPUNCTURE: CPT

## 2023-10-04 PROCEDURE — 99606 MTMS BY PHARM EST 15 MIN: CPT | Performed by: PHARMACIST

## 2023-10-04 PROCEDURE — 99207 PR NO CHARGE NURSE ONLY: CPT

## 2023-10-04 PROCEDURE — 90471 IMMUNIZATION ADMIN: CPT

## 2023-10-04 NOTE — PATIENT INSTRUCTIONS
"Recommendations from today's Medication Management (MTM) visit:                                                        Checking kidney function and electrolytes today.     If within normal range, increase to Jardiance 25 mg every morning.     Covid booster and flu vaccine today.       To schedule another MTM appointment, please call the MTM scheduling line at [phone] or toll-free at [phone].     My MTM pharmacist's contact information:      Please feel free to contact me with any questions or concerns you have via Bluwan or calling the clinic.       Mayda Dupree, PharmD, [facility]  Medication Management (MTM) Pharmacist  [facility]     It was great speaking with you today.  I value your experience and would be very thankful for your time in providing feedback in our clinic survey. In the next few days, you may receive an email or text message from [employer] with a link to a survey related to your  clinical pharmacist.\"     "

## 2023-11-07 NOTE — PROGRESS NOTES
Medication Therapy Management (MTM) Encounter    ASSESSMENT:                            1. Type 2 diabetes mellitus   Most recent A1c improved to 7.6%, but still above goal <7% per ADA guidelines. Have since added SGLT-2 inhibitor and maximized dose. Recheck BMP today. Discussed optimal lifestyle interventions to help control blood sugars, including healthy diet and physical activity. Advised to eat a diet low in carbohydrates with increased vegetables, healthy fat, and protein. Will also be increasing exercise over the winter months in his indoor sport court.     2. Hypertension  Blood pressure is well controlled and meeting goal of <130/80 mm Hg per ACC/AHA hypertension guidelines.    3. Hyperlipidemia  Appropriately on a moderate intensity statin given age and diabetes diagnosis per ACC/AHA guidelines.     PLAN:                            Check BMP.      Follow-up: Return in about 2 months (around 1/8/2024).    SUBJECTIVE/OBJECTIVE:                          Bob Neely is a 53 year old male coming in a follow up visit. He was referred to me from Andres Gagnon. This is a follow up from 8/28/23.      Reason for visit: Jardiance follow up    Allergies/ADRs: Reviewed in chart  Past Medical History: Reviewed in chart  Tobacco: He reports that he has never smoked. He has never used smokeless tobacco.  Alcohol: yes, rarely    Medication Adherence/Access: no issues reported. Says he never forgets a dose. Takes medications twice daily. Uses a pillbox.     1. Type 2 diabetes mellitus  Currently taking metformin 1000 mg twice daily, Jardiance 25 mg every morning, and Mounjaro 15 mg subcutaneous weekly. At our last visit, we increased Jardiance. Some increase in urination, but has been improving and tolerable. Drinking more water. Previously, last year, we stopped Ozempic and replaced with Mounjaro as he was no longer noticing appetite suppression and was gaining back weight. Tolerating Mounjaro well. He is  noticing some appetite suppression, but effect is waning a bit. Fasting blood sugars are still above goal. Usually checking fasting in the morning or between meals. Admits to eating dinner late usually, which is biggest meal of the day. Also not exercising as much and has been more stressed with family situations.   AM: 180s  Post-prandial: 135-170  Symptoms of low/high blood sugar? none  Diet/Exercise: Follows good diabetic diet and is physically active; eats low carb. Has indoor sport court and walks a lot. July 5th got lap band removed as he had trouble eating protein. Feeling much better and stronger.   Aspirin: Not taking due to age/low risk  Statin: Yes: simvastatin    ACEi/ARB: Yes: lisinopril  Eye exam is up to date  Urine Albumin:   Lab Results   Component Value Date    UMALCR 6.69 12/05/2022      Lab Results   Component Value Date    A1C 7.6 (H) 08/28/2023     2. Hypertension  Current medications include lisinopril 20 mg daily.  Patient does not self-monitor blood pressure.  Patient reports no current medication side effects.     BP Readings from Last 3 Encounters:   11/08/23 122/62   10/04/23 120/66   08/28/23 124/64     3. Hyperlipidemia  Current therapy includes simvastatin 40 mg every evening.  Patient reports no significant myalgias or other side effects.    The ASCVD Risk score (Davina LYLES, et al., 2019) failed to calculate for the following reasons:    The valid total cholesterol range is 130 to 320 mg/dL     Recent Labs   Lab Test 12/05/22  0848 04/22/22  0934   CHOL 152 112   HDL 57 36*   LDL 67 47   TRIG 140 144       Vitals:   BP Readings from Last 3 Encounters:   11/08/23 122/62   10/04/23 120/66   08/28/23 124/64      Pulse Readings from Last 3 Encounters:   11/08/23 72   10/04/23 70   07/05/23 60     Wt Readings from Last 3 Encounters:   11/08/23 287 lb (130.2 kg)   10/04/23 289 lb (131.1 kg)   08/28/23 282 lb 3.2 oz (128 kg)     ----------------    I spent 25 minutes with this patient today.  All changes were made via collaborative practice agreement with Andres Dc MD. A copy of the visit note was provided to the patient's provider(s).    The patient was given a summary of these recommendations via [employer] Forest[employer].     Debbie SuarezD, BCACP  Medication Management (MTM) Pharmacist  [facility]        Medication Therapy Recommendations  No medication therapy recommendations to display

## 2023-11-07 NOTE — PATIENT INSTRUCTIONS
"Recommendations from today's Medication Management (MTM) visit:                                                        Checking kidney function and electrolytes today since increasing Jardiance.     Focus on eating LESS carbohydrates/sugar and MORE vegetables, healthy fat and protein as well as increasing exercise.         To schedule another MTM appointment, please call the MTM scheduling line at [phone] or toll-free at [phone].     My MTM pharmacist's contact information:      Please feel free to contact me with any questions or concerns you have via Cuculus or calling the clinic.       Mayda Dupree, PharmD, [facility]  Medication Management (MTM) Pharmacist  [facility]     It was great speaking with you today.  I value your experience and would be very thankful for your time in providing feedback in our clinic survey. In the next few days, you may receive an email or text message from [employer] with a link to a survey related to your  clinical pharmacist.\"     "

## 2023-11-08 ENCOUNTER — LAB (OUTPATIENT)
Dept: LAB | Facility: CLINIC | Age: 53
End: 2023-11-08
Payer: COMMERCIAL

## 2023-11-08 ENCOUNTER — OFFICE VISIT (OUTPATIENT)
Dept: PHARMACY | Facility: CLINIC | Age: 53
End: 2023-11-08
Payer: COMMERCIAL

## 2023-11-08 VITALS
HEART RATE: 72 BPM | SYSTOLIC BLOOD PRESSURE: 122 MMHG | BODY MASS INDEX: 33.17 KG/M2 | WEIGHT: 287 LBS | DIASTOLIC BLOOD PRESSURE: 62 MMHG

## 2023-11-08 DIAGNOSIS — I10 ESSENTIAL HYPERTENSION: ICD-10-CM

## 2023-11-08 DIAGNOSIS — E11.69 TYPE 2 DIABETES MELLITUS WITH OTHER SPECIFIED COMPLICATION, WITHOUT LONG-TERM CURRENT USE OF INSULIN (H): ICD-10-CM

## 2023-11-08 DIAGNOSIS — E78.49 OTHER HYPERLIPIDEMIA: ICD-10-CM

## 2023-11-08 DIAGNOSIS — E11.69 TYPE 2 DIABETES MELLITUS WITH OTHER SPECIFIED COMPLICATION, WITHOUT LONG-TERM CURRENT USE OF INSULIN (H): Primary | ICD-10-CM

## 2023-11-08 LAB
ANION GAP SERPL CALCULATED.3IONS-SCNC: 8 MMOL/L (ref 7–15)
BUN SERPL-MCNC: 8.8 MG/DL (ref 6–20)
CALCIUM SERPL-MCNC: 9.5 MG/DL (ref 8.6–10)
CHLORIDE SERPL-SCNC: 102 MMOL/L (ref 98–107)
CREAT SERPL-MCNC: 1.16 MG/DL (ref 0.67–1.17)
DEPRECATED HCO3 PLAS-SCNC: 27 MMOL/L (ref 22–29)
EGFRCR SERPLBLD CKD-EPI 2021: 74 ML/MIN/1.73M2
GLUCOSE SERPL-MCNC: 200 MG/DL (ref 70–99)
POTASSIUM SERPL-SCNC: 5 MMOL/L (ref 3.4–5.3)
SODIUM SERPL-SCNC: 137 MMOL/L (ref 135–145)

## 2023-11-08 PROCEDURE — 99607 MTMS BY PHARM ADDL 15 MIN: CPT | Performed by: PHARMACIST

## 2023-11-08 PROCEDURE — 99606 MTMS BY PHARM EST 15 MIN: CPT | Performed by: PHARMACIST

## 2023-11-08 PROCEDURE — 80048 BASIC METABOLIC PNL TOTAL CA: CPT

## 2023-11-08 PROCEDURE — 36415 COLL VENOUS BLD VENIPUNCTURE: CPT

## 2023-11-08 RX ORDER — TIRZEPATIDE 15 MG/.5ML
15 INJECTION, SOLUTION SUBCUTANEOUS
Qty: 6 ML | Refills: 3 | Status: SHIPPED | OUTPATIENT
Start: 2023-11-08 | End: 2024-02-23

## 2023-12-02 ENCOUNTER — MYC REFILL (OUTPATIENT)
Dept: PHARMACY | Facility: CLINIC | Age: 53
End: 2023-12-02
Payer: COMMERCIAL

## 2023-12-02 DIAGNOSIS — E11.69 TYPE 2 DIABETES MELLITUS WITH OTHER SPECIFIED COMPLICATION, WITHOUT LONG-TERM CURRENT USE OF INSULIN (H): ICD-10-CM

## 2023-12-04 RX ORDER — TIRZEPATIDE 15 MG/.5ML
15 INJECTION, SOLUTION SUBCUTANEOUS
Qty: 6 ML | Refills: 3 | OUTPATIENT
Start: 2023-12-04

## 2023-12-31 DIAGNOSIS — E78.49 OTHER HYPERLIPIDEMIA: ICD-10-CM

## 2023-12-31 DIAGNOSIS — E11.69 TYPE 2 DIABETES MELLITUS WITH OTHER SPECIFIED COMPLICATION, WITHOUT LONG-TERM CURRENT USE OF INSULIN (H): ICD-10-CM

## 2024-01-02 RX ORDER — SIMVASTATIN 40 MG
TABLET ORAL
Qty: 90 TABLET | Refills: 1 | Status: SHIPPED | OUTPATIENT
Start: 2024-01-02 | End: 2024-06-10

## 2024-01-02 RX ORDER — EMPAGLIFLOZIN 25 MG/1
25 TABLET, FILM COATED ORAL DAILY
Qty: 90 TABLET | Refills: 1 | Status: SHIPPED | OUTPATIENT
Start: 2024-01-02 | End: 2024-06-10

## 2024-01-19 NOTE — PROGRESS NOTES
Medication Therapy Management (MTM) Encounter    ASSESSMENT:                            1. Type 2 diabetes mellitus   A1c worsened to 8.9%, above goal <7% per ADA guidelines. On maximum dose Mounjaro, Jardiance, and metformin. Discussed additional medication options, including sulfonylurea, TZD, or basal insulin. Previously was on glipizide, but stopped when Ozempic changed to Mounjaro. Given elevated blood sugars, opted to restart. Will start at 10 mg dose and if blood sugars remain above goal can increase to 20 mg daily.   Discussed optimal lifestyle interventions to help control blood sugars, including healthy diet and physical activity. Advised to eat a diet low in carbohydrates with increased vegetables, healthy fat, and protein. Will also be increasing exercise over the winter months in his indoor sport court.     2. Hypertension  Blood pressure is well controlled and meeting goal of <130/80 mm Hg per ACC/AHA hypertension guidelines.    3. Hyperlipidemia  Appropriately on a moderate intensity statin given age and diabetes diagnosis per ACC/AHA guidelines.     PLAN:                            Check A1c, CMP, lipids, microalbumin, and vitamin B12.     Start glipizide ER 10 mg daily.     Blood Sugar Goals  Before meals:   2 hours after meal: less than 180       Follow-up: Return in about 6 weeks (around 3/4/2024).    SUBJECTIVE/OBJECTIVE:                          Bob Neely is a 53 year old male coming in a follow up visit. He was referred to me from Andres Gagnon. This is a follow up from 11/8/23.      Reason for visit: diabetes follow up    Allergies/ADRs: Reviewed in chart  Past Medical History: Reviewed in chart  Tobacco: He reports that he has never smoked. He has never used smokeless tobacco.  Alcohol: yes, rarely    Medication Adherence/Access: no issues reported. Says he never forgets a dose. Takes medications twice daily. Uses a pillbox.     1. Type 2 diabetes mellitus  Currently taking  metformin 1000 mg twice daily, Jardiance 25 mg every morning, and Mounjaro 15 mg subcutaneous weekly. Tolerating medications well without side effects. Noticing blood sugars have been higher over the last couple months. Both fasting and post-prandial numbers high and above goal despite making positive dietary changes.   AM: 195-210  Post-prandial: 250-260  Symptoms of low/high blood sugar? none  Diet/Exercise: Follows good diabetic diet and is physically active; eats low carb. Has indoor sport court and walks a lot. Last summer he had his lap band removed as he had trouble eating protein. Feeling much better and stronger.   Aspirin: Not taking due to age/low risk  Statin: Yes: simvastatin    ACEi/ARB: Yes: lisinopril  Eye exam is up to date  Urine Albumin:   Lab Results   Component Value Date    UMALCR 6.69 12/05/2022      Lab Results   Component Value Date    A1C 8.9 (H) 01/22/2024     2. Hypertension  Current medications include lisinopril 20 mg daily.  Patient does not self-monitor blood pressure.  Patient reports no current medication side effects.     BP Readings from Last 3 Encounters:   01/22/24 122/84   11/08/23 122/62   10/04/23 120/66     3. Hyperlipidemia  Current therapy includes simvastatin 40 mg every evening.  Patient reports no significant myalgias or other side effects.    The ASCVD Risk score (Davina LYLES, et al., 2019) failed to calculate for the following reasons:    The valid total cholesterol range is 130 to 320 mg/dL     Recent Labs   Lab Test 12/05/22  0848 04/22/22  0934   CHOL 152 112   HDL 57 36*   LDL 67 47   TRIG 140 144       Vitals:   BP Readings from Last 3 Encounters:   01/22/24 122/84   11/08/23 122/62   10/04/23 120/66      Pulse Readings from Last 3 Encounters:   01/22/24 82   11/08/23 72   10/04/23 70     Wt Readings from Last 3 Encounters:   01/22/24 301 lb 1.6 oz (136.6 kg)   11/08/23 287 lb (130.2 kg)   10/04/23 289 lb (131.1 kg)     ----------------    I spent 25 minutes with  this patient today. All changes were made via collaborative practice agreement with Andres Dc MD. A copy of the visit note was provided to the patient's provider(s).    The patient was given a summary of these recommendations via [employer].     Mayda Dupree, PharmD, [facility]  Medication Management (MTM) Pharmacist  [facility]        Medication Therapy Recommendations  Type 2 diabetes mellitus (H)    Current Medication: empagliflozin (JARDIANCE) 25 MG TABS tablet (Discontinued)   Rationale: Medication requires monitoring - Needs additional monitoring - Safety   Recommendation: Order Lab   Status: Accepted per CPA          Current Medication: glipiZIDE (GLUCOTROL XL) 10 MG 24 hr tablet   Rationale: Synergistic therapy - Needs additional medication therapy - Indication   Recommendation: Start Medication   Status: Accepted per CPA

## 2024-01-22 ENCOUNTER — LAB (OUTPATIENT)
Dept: LAB | Facility: CLINIC | Age: 54
End: 2024-01-22
Payer: COMMERCIAL

## 2024-01-22 ENCOUNTER — OFFICE VISIT (OUTPATIENT)
Dept: PHARMACY | Facility: CLINIC | Age: 54
End: 2024-01-22
Payer: COMMERCIAL

## 2024-01-22 VITALS
HEART RATE: 82 BPM | SYSTOLIC BLOOD PRESSURE: 122 MMHG | DIASTOLIC BLOOD PRESSURE: 84 MMHG | WEIGHT: 301.1 LBS | BODY MASS INDEX: 34.8 KG/M2

## 2024-01-22 DIAGNOSIS — N52.9 MALE ERECTILE DISORDER: ICD-10-CM

## 2024-01-22 DIAGNOSIS — Z00.00 ENCOUNTER FOR SCREENING AND PREVENTATIVE CARE: ICD-10-CM

## 2024-01-22 DIAGNOSIS — E11.69 TYPE 2 DIABETES MELLITUS WITH OTHER SPECIFIED COMPLICATION, WITHOUT LONG-TERM CURRENT USE OF INSULIN (H): ICD-10-CM

## 2024-01-22 DIAGNOSIS — I10 ESSENTIAL HYPERTENSION: ICD-10-CM

## 2024-01-22 DIAGNOSIS — E11.69 TYPE 2 DIABETES MELLITUS WITH OTHER SPECIFIED COMPLICATION, WITHOUT LONG-TERM CURRENT USE OF INSULIN (H): Primary | ICD-10-CM

## 2024-01-22 LAB
ALBUMIN SERPL BCG-MCNC: 4.3 G/DL (ref 3.5–5.2)
ALP SERPL-CCNC: 65 U/L (ref 40–150)
ALT SERPL W P-5'-P-CCNC: 28 U/L (ref 0–70)
ANION GAP SERPL CALCULATED.3IONS-SCNC: 10 MMOL/L (ref 7–15)
AST SERPL W P-5'-P-CCNC: 23 U/L (ref 0–45)
BILIRUB SERPL-MCNC: 0.5 MG/DL
BUN SERPL-MCNC: 14.9 MG/DL (ref 6–20)
CALCIUM SERPL-MCNC: 9.9 MG/DL (ref 8.6–10)
CHLORIDE SERPL-SCNC: 100 MMOL/L (ref 98–107)
CHOLEST SERPL-MCNC: 148 MG/DL
CREAT SERPL-MCNC: 1.28 MG/DL (ref 0.67–1.17)
CREAT UR-MCNC: 68.1 MG/DL
DEPRECATED HCO3 PLAS-SCNC: 26 MMOL/L (ref 22–29)
EGFRCR SERPLBLD CKD-EPI 2021: 66 ML/MIN/1.73M2
FASTING STATUS PATIENT QL REPORTED: YES
GLUCOSE SERPL-MCNC: 238 MG/DL (ref 70–99)
HBA1C MFR BLD: 8.9 % (ref 0–5.6)
HDLC SERPL-MCNC: 58 MG/DL
LDLC SERPL CALC-MCNC: 54 MG/DL
MICROALBUMIN UR-MCNC: <12 MG/L
MICROALBUMIN/CREAT UR: NORMAL MG/G{CREAT}
NONHDLC SERPL-MCNC: 90 MG/DL
POTASSIUM SERPL-SCNC: 4.7 MMOL/L (ref 3.4–5.3)
PROT SERPL-MCNC: 7.1 G/DL (ref 6.4–8.3)
PSA SERPL DL<=0.01 NG/ML-MCNC: 0.63 NG/ML (ref 0–3.5)
SODIUM SERPL-SCNC: 136 MMOL/L (ref 135–145)
TRIGL SERPL-MCNC: 178 MG/DL
VIT B12 SERPL-MCNC: 719 PG/ML (ref 232–1245)

## 2024-01-22 PROCEDURE — 99605 MTMS BY PHARM NP 15 MIN: CPT | Performed by: PHARMACIST

## 2024-01-22 PROCEDURE — 80053 COMPREHEN METABOLIC PANEL: CPT

## 2024-01-22 PROCEDURE — 82570 ASSAY OF URINE CREATININE: CPT

## 2024-01-22 PROCEDURE — 80061 LIPID PANEL: CPT

## 2024-01-22 PROCEDURE — 83036 HEMOGLOBIN GLYCOSYLATED A1C: CPT

## 2024-01-22 PROCEDURE — 99607 MTMS BY PHARM ADDL 15 MIN: CPT | Performed by: PHARMACIST

## 2024-01-22 PROCEDURE — 36415 COLL VENOUS BLD VENIPUNCTURE: CPT

## 2024-01-22 PROCEDURE — 82607 VITAMIN B-12: CPT

## 2024-01-22 PROCEDURE — 82043 UR ALBUMIN QUANTITATIVE: CPT

## 2024-01-22 PROCEDURE — G0103 PSA SCREENING: HCPCS

## 2024-01-22 RX ORDER — GLIPIZIDE 10 MG/1
10 TABLET, FILM COATED, EXTENDED RELEASE ORAL DAILY
Qty: 90 TABLET | Refills: 3 | Status: SHIPPED | OUTPATIENT
Start: 2024-01-22 | End: 2024-05-03

## 2024-01-22 NOTE — PATIENT INSTRUCTIONS
"Recommendations from today's Medication Management (MTM) visit:                                                      A1c 8.9% today.     Let's restart glipizide ER 10 mg daily.     Blood Sugar Goals  Before meals:   2 hours after meal: less than 180       To schedule another MTM appointment, please call the MTM scheduling line at [phone] or toll-free at [phone].     My MTM pharmacist's contact information:      Please feel free to contact me with any questions or concerns you have via [employer] or calling the clinic.       Mayda Dupree, PharmD, [facility]  Medication Management (MTM) Pharmacist  [facility]     It was great speaking with you today.  I value your experience and would be very thankful for your time in providing feedback in our clinic survey. In the next few days, you may receive an email or text message from [employer] with a link to a survey related to your  clinical pharmacist.\"     "

## 2024-01-24 ENCOUNTER — OFFICE VISIT (OUTPATIENT)
Dept: FAMILY MEDICINE | Facility: CLINIC | Age: 54
End: 2024-01-24
Payer: COMMERCIAL

## 2024-01-24 VITALS
BODY MASS INDEX: 34.78 KG/M2 | WEIGHT: 301 LBS | DIASTOLIC BLOOD PRESSURE: 70 MMHG | HEART RATE: 78 BPM | SYSTOLIC BLOOD PRESSURE: 118 MMHG | TEMPERATURE: 97.1 F | OXYGEN SATURATION: 99 %

## 2024-01-24 DIAGNOSIS — E11.69 TYPE 2 DIABETES MELLITUS WITH OTHER SPECIFIED COMPLICATION, WITHOUT LONG-TERM CURRENT USE OF INSULIN (H): Primary | ICD-10-CM

## 2024-01-24 DIAGNOSIS — E66.01 MORBID OBESITY (H): ICD-10-CM

## 2024-01-24 DIAGNOSIS — I10 ESSENTIAL HYPERTENSION: ICD-10-CM

## 2024-01-24 DIAGNOSIS — E78.49 OTHER HYPERLIPIDEMIA: ICD-10-CM

## 2024-01-24 PROCEDURE — 99214 OFFICE O/P EST MOD 30 MIN: CPT | Performed by: FAMILY MEDICINE

## 2024-01-24 NOTE — PROGRESS NOTES
"  Assessment & Plan     (E11.69) Type 2 diabetes mellitus  (H)  (primary encounter diagnosis)  Comment: Suboptimal control with A1c at 8.9  Plan: PRIMARY CARE FOLLOW-UP SCHEDULING             (E78.49) Hyperlipidemia  Comment: Overall well-controlled just a slight elevation of triglyceride  Plan:      (I10) Hypertension  Comment: Currently well-controlled  Plan:      (E66.01) Morbid obesity (H)  Comment: Patient is obese by BMI criteria though he does feel much better with the removal of the gastric band  Plan:      PLAN:  1.  Recent laboratory studies reviewed.  2.  Glipizide has been restarted, this is in addition to the metformin Jardiance and Mounjaro  3.  Patient is remaining and continues to be quite physically active  4.  Patient works regularly with our pharmacist  5.  Patient is due for a physical in 6 months            BMI  Estimated body mass index is 34.78 kg/m  as calculated from the following:    Height as of 7/5/23: 1.981 m (6' 6\").    Weight as of this encounter: 136.5 kg (301 lb).           Елена Ivy is a 53 year old, presenting for the following health issues:  Patient comes in for follow-up of his diabetes and comorbidities.  Patient is also followed by our pharmacist, he saw her just the other day his A1c was 8.9, based on his glipizide which she had previously been was reinstituted.    He is already on the maximum doses of metformin and Jardiance as well as Mounjaro.    Patient has a history of morbid obesity I saw him in July for preoperative visit, he had the gastric band removed which had been placed quite a number of years previously.  The gastric band was causing problems with not feeling well not being able to eat well he feels much better he is eating better though his weight has gone up a bit and he would like to bring that down.    Patient has been more active he has an indoor gym which has been extremely helpful for him.    Patient's blood pressure and overall cholesterol remain " well-controlled    He is up-to-date on all of his immunizations he did get the second shingles vaccine and he has an upcoming eye appointment.        6 Months Follow Up      1/24/2024     8:35 AM   Additional Questions   Roomed by Corina BRADSHAW     History of Present Illness       Diabetes:   He presents for follow up of diabetes.  He is checking home blood glucose three times daily.   He checks blood glucose before and after meals and at bedtime.  Blood glucose is sometimes over 200 and never under 70. He is aware of hypoglycemia symptoms including none.   He is concerned about blood sugar frequently over 200.    He is not experiencing numbness or burning in feet, excessive thirst, blurry vision, weight changes or redness, sores or blisters on feet. The patient has not had a diabetic eye exam in the last 12 months.          He eats 4 or more servings of fruits and vegetables daily.He consumes 1 sweetened beverage(s) daily.He exercises with enough effort to increase his heart rate 10 to 19 minutes per day.  He exercises with enough effort to increase his heart rate 4 days per week.   He is taking medications regularly.                     Objective    There were no vitals taken for this visit.  There is no height or weight on file to calculate BMI.  Physical Exam               Signed Electronically by: Andres Dc MD

## 2024-04-01 DIAGNOSIS — E11.69 TYPE 2 DIABETES MELLITUS WITH OTHER SPECIFIED COMPLICATION, WITHOUT LONG-TERM CURRENT USE OF INSULIN (H): ICD-10-CM

## 2024-04-22 ENCOUNTER — VIRTUAL VISIT (OUTPATIENT)
Dept: PHARMACY | Facility: CLINIC | Age: 54
End: 2024-04-22
Payer: COMMERCIAL

## 2024-04-22 DIAGNOSIS — E11.69 TYPE 2 DIABETES MELLITUS WITH OTHER SPECIFIED COMPLICATION, WITHOUT LONG-TERM CURRENT USE OF INSULIN (H): Primary | ICD-10-CM

## 2024-04-22 PROCEDURE — 99607 MTMS BY PHARM ADDL 15 MIN: CPT

## 2024-04-22 PROCEDURE — 99606 MTMS BY PHARM EST 15 MIN: CPT | Mod: 93

## 2024-04-22 NOTE — PROGRESS NOTES
Medication Therapy Management (MTM) Encounter    ASSESSMENT:                            Medication Adherence/Access: No issues identified, recommend patient continue to work with [facility] to help with getting Mounjaro.     Diabetes: No renal dose adjustment required for metformin at this time. Was not able to obtain updated A1c prior to our visit today. Pending updated A1c results, may benefit from a dose increase of glipizide if A1c remains elevated above goal of <7%. Suspect that A1c will improve given patient reported blood sugars closer to fasting goal of  and 2 hour post prandial goal of <180.     PLAN:                            Call [facility] at [phone] to schedule Mounjaro refills, let me know if you run into any concerns with doing this.     Obtain updated A1c and BMP labs on 5/3, if A1c remains elevated may consider a dose increase of glipizide.     Follow-up: Via My Chart pending A1c results     SUBJECTIVE/OBJECTIVE:                          Biju Neely is a 54 year old male called for a follow-up visit.       Reason for visit: Diabetes follow up.    Allergies/ADRs: Reviewed in chart  Past Medical History: Reviewed in chart  Tobacco: He reports that he has never smoked. He has never used smokeless tobacco.  Alcohol: Rarely.     Medication Adherence/Access: Continues to struggle some with finding Mounjaro due to national medication shortage. Has enough supply at this time.     Diabetes   Metformin 1,000mg twice daily   Mounjaro 15mg weekly   Glipizide XL 10mg daily (re-started at last MTM visit)   Jardiance 25mg daily   Declines any side effects.   Blood sugar monitoring (patient reported): Fasting 150-160s, previously 200s before starting glipizide, also reports blood sugars of 130-150 throughout the day. Declines any low blood sugar readings less than 70 or hypoglycemic symptoms.      Urine Albumin:   Lab Results   Component Value Date    UMALCR  01/22/2024      Comment:       Unable to calculate, urine albumin and/or urine creatinine is outside detectable limits.  Microalbuminuria is defined as an albumin:creatinine ratio of 17 to 299 for males and 25 to 299 for females. A ratio of albumin:creatinine of 300 or higher is indicative of overt proteinuria.  Due to biologic variability, positive results should be confirmed by a second, first-morning random or 24-hour timed urine specimen. If there is discrepancy, a third specimen is recommended. When 2 out of 3 results are in the microalbuminuria range, this is evidence for incipient nephropathy and warrants increased efforts at glucose control, blood pressure control, and institution of therapy with an angiotensin-converting-enzyme (ACE) inhibitor (if the patient can tolerate it).        Lab Results   Component Value Date    A1C 8.9 (H) 01/22/2024     GFR Estimate   Date Value Ref Range Status   01/22/2024 66 >60 mL/min/1.73m2 Final   11/08/2023 74 >60 mL/min/1.73m2 Final   10/04/2023 73 >60 mL/min/1.73m2 Final   01/25/2021 >60 >60 mL/min/1.73m2 Final   08/14/2020 >60 >60 mL/min/1.73m2 Final   07/12/2019 >60 >60 mL/min/1.73m2 Final     Today's Vitals: There were no vitals taken for this visit.  ----------------  I spent 18 minutes with this patient today. All changes were made via collaborative practice agreement with Andres Dc MD. A copy of the visit note was provided to the patient's provider(s).    A summary of these recommendations was sent via [employer].    Debbie GallegoD  Medication Therapy Management Pharmacist   [facility]     Telemedicine Visit Details  Type of service:  Telephone visit  Start Time:  10:00 AM  End Time: 10:18 AM     Medication Therapy Recommendations  No medication therapy recommendations to display

## 2024-04-22 NOTE — PATIENT INSTRUCTIONS
"Recommendations from today's MTM visit:                                                      Call [facility] at [phone] to schedule Mounjaro refills, let me know if you run into any concerns with doing this.     Obtain updated A1c and BMP labs on 5/3, if A1c remains elevated may consider a dose increase of glipizide.     Follow-up: Via My Chart pending A1c results   It was great speaking with you today.  I value your experience and would be very thankful for your time in providing feedback in our clinic survey. In the next few days, you may receive an email or text message from [facility] with a link to a survey related to your  clinical pharmacist.\"     To schedule another MTM appointment, please call the clinic directly or you may call the MTM scheduling line at [phone].    My Clinical Pharmacist's contact information:                                                      Please feel free to contact me with any questions or concerns you have.      Sis Mejias, PharmD  Medication Therapy Management Pharmacist   [facility]    "

## 2024-05-03 ENCOUNTER — LAB (OUTPATIENT)
Dept: LAB | Facility: CLINIC | Age: 54
End: 2024-05-03
Payer: COMMERCIAL

## 2024-05-03 DIAGNOSIS — E11.69 TYPE 2 DIABETES MELLITUS WITH OTHER SPECIFIED COMPLICATION, WITHOUT LONG-TERM CURRENT USE OF INSULIN (H): ICD-10-CM

## 2024-05-03 LAB — HBA1C MFR BLD: 7.8 % (ref 0–5.6)

## 2024-05-03 PROCEDURE — 36415 COLL VENOUS BLD VENIPUNCTURE: CPT

## 2024-05-03 PROCEDURE — 80048 BASIC METABOLIC PNL TOTAL CA: CPT

## 2024-05-03 PROCEDURE — 83036 HEMOGLOBIN GLYCOSYLATED A1C: CPT

## 2024-05-04 LAB
ANION GAP SERPL CALCULATED.3IONS-SCNC: 12 MMOL/L (ref 7–15)
BUN SERPL-MCNC: 15.6 MG/DL (ref 6–20)
CALCIUM SERPL-MCNC: 9.4 MG/DL (ref 8.6–10)
CHLORIDE SERPL-SCNC: 101 MMOL/L (ref 98–107)
CREAT SERPL-MCNC: 1.31 MG/DL (ref 0.67–1.17)
DEPRECATED HCO3 PLAS-SCNC: 24 MMOL/L (ref 22–29)
EGFRCR SERPLBLD CKD-EPI 2021: 64 ML/MIN/1.73M2
GLUCOSE SERPL-MCNC: 194 MG/DL (ref 70–99)
POTASSIUM SERPL-SCNC: 4.6 MMOL/L (ref 3.4–5.3)
SODIUM SERPL-SCNC: 137 MMOL/L (ref 135–145)

## 2024-06-09 DIAGNOSIS — I10 ESSENTIAL (PRIMARY) HYPERTENSION: ICD-10-CM

## 2024-06-09 DIAGNOSIS — E78.49 OTHER HYPERLIPIDEMIA: ICD-10-CM

## 2024-06-09 DIAGNOSIS — E11.69 TYPE 2 DIABETES MELLITUS WITH OTHER SPECIFIED COMPLICATION, WITHOUT LONG-TERM CURRENT USE OF INSULIN (H): ICD-10-CM

## 2024-06-09 DIAGNOSIS — N52.9 MALE ERECTILE DISORDER: ICD-10-CM

## 2024-06-10 RX ORDER — TADALAFIL 20 MG/1
TABLET ORAL
Qty: 8 TABLET | Refills: 5 | Status: SHIPPED | OUTPATIENT
Start: 2024-06-10

## 2024-06-10 RX ORDER — LISINOPRIL 20 MG/1
20 TABLET ORAL DAILY
Qty: 90 TABLET | Refills: 1 | Status: SHIPPED | OUTPATIENT
Start: 2024-06-10

## 2024-06-10 RX ORDER — EMPAGLIFLOZIN 25 MG/1
25 TABLET, FILM COATED ORAL DAILY
Qty: 90 TABLET | Refills: 1 | Status: SHIPPED | OUTPATIENT
Start: 2024-06-10

## 2024-06-10 RX ORDER — SIMVASTATIN 40 MG
TABLET ORAL
Qty: 90 TABLET | Refills: 1 | Status: SHIPPED | OUTPATIENT
Start: 2024-06-10

## 2024-06-27 ENCOUNTER — TRANSFERRED RECORDS (OUTPATIENT)
Dept: HEALTH INFORMATION MANAGEMENT | Facility: CLINIC | Age: 54
End: 2024-06-27
Payer: COMMERCIAL

## 2024-06-27 LAB — RETINOPATHY: NEGATIVE

## 2024-06-28 DIAGNOSIS — Z12.11 COLON CANCER SCREENING: ICD-10-CM

## 2024-07-12 ENCOUNTER — ORDERS ONLY (AUTO-RELEASED) (OUTPATIENT)
Dept: ADMISSION | Facility: CLINIC | Age: 54
End: 2024-07-12
Payer: COMMERCIAL

## 2024-07-12 DIAGNOSIS — Z12.11 COLON CANCER SCREENING: ICD-10-CM

## 2024-08-07 LAB — NONINV COLON CA DNA+OCC BLD SCRN STL QL: NEGATIVE

## 2024-09-18 ENCOUNTER — OFFICE VISIT (OUTPATIENT)
Dept: PHARMACY | Facility: CLINIC | Age: 54
End: 2024-09-18
Payer: COMMERCIAL

## 2024-09-18 ENCOUNTER — LAB (OUTPATIENT)
Dept: LAB | Facility: CLINIC | Age: 54
End: 2024-09-18
Payer: COMMERCIAL

## 2024-09-18 VITALS
WEIGHT: 315 LBS | SYSTOLIC BLOOD PRESSURE: 116 MMHG | HEART RATE: 106 BPM | DIASTOLIC BLOOD PRESSURE: 80 MMHG | BODY MASS INDEX: 36.81 KG/M2

## 2024-09-18 DIAGNOSIS — E11.69 TYPE 2 DIABETES MELLITUS WITH OTHER SPECIFIED COMPLICATION, WITHOUT LONG-TERM CURRENT USE OF INSULIN (H): ICD-10-CM

## 2024-09-18 DIAGNOSIS — E11.69 TYPE 2 DIABETES MELLITUS WITH OTHER SPECIFIED COMPLICATION, WITHOUT LONG-TERM CURRENT USE OF INSULIN (H): Primary | ICD-10-CM

## 2024-09-18 LAB
EST. AVERAGE GLUCOSE BLD GHB EST-MCNC: 183 MG/DL
HBA1C MFR BLD: 8 % (ref 0–5.6)

## 2024-09-18 PROCEDURE — 83036 HEMOGLOBIN GLYCOSYLATED A1C: CPT

## 2024-09-18 PROCEDURE — 99606 MTMS BY PHARM EST 15 MIN: CPT

## 2024-09-18 PROCEDURE — 36415 COLL VENOUS BLD VENIPUNCTURE: CPT

## 2024-09-18 RX ORDER — PIOGLITAZONEHYDROCHLORIDE 15 MG/1
30 TABLET ORAL DAILY
Qty: 90 TABLET | Refills: 1 | Status: SHIPPED | OUTPATIENT
Start: 2024-09-18

## 2024-09-18 NOTE — PROGRESS NOTES
Medication Therapy Management (MTM) Encounter    ASSESSMENT:                            Medication Adherence/Access: No issues identified    Diabetes  Patient is not meeting A1c goal of < 7%. Self monitoring of blood glucose is not at goal of fasting  mg/dL. Patient is on max dose of metformin, Mounjaro, glipizide, and Jardiance and diet/exercise is reasonable. Recommend addition of Actos at this time which patient has been on in the past.   Microalbumin is at goal < 30mg/g.    PLAN:                            Start Actos 30mg once daily     Follow-up: Return in about 3 months (around 12/18/2024).    SUBJECTIVE/OBJECTIVE:                          Biju Neely is a 54 year old male seen for a follow-up visit from 4/22/24.       Reason for visit: diabetes follow-up     Allergies/ADRs: Reviewed in chart  Past Medical History: Reviewed in chart  Tobacco: He reports that he has never smoked. He has never used smokeless tobacco.  Alcohol: Less than 1 beverage / month    Medication Adherence/Access: no issues reported    Diabetes   Metformin 1,000mg twice daily   Mounjaro 15mg weekly   Glipizide XL 20mg daily  Jardiance 25mg daily   Denies any side effects.     Blood sugar monitoring (patient reported): Fasting 160-195. As low as 135 during day but not over 200.   Denies any low blood sugar readings less than 70 or hypoglycemic symptoms.     Diet: lots of protein, fruits/veg, natural carbohydrates. Does not eat sweets or alcohol often. Does not eat out often either.   Activity: basketball 3-4x/week, 1 hour walking daily     Eye exam is up to date  Foot exam: due      Today's Vitals: /80   Pulse 106   Wt 318 lb 8 oz (144.5 kg)   BMI 36.81 kg/m    ----------------    I spent 15 minutes with this patient today. All changes were made via collaborative practice agreement with Andres Dc MD. A copy of the visit note was provided to the patient's provider(s).    A summary of these recommendations was sent  via [employer]    Zelda Porter, PharmD  Medication Therapy Management (MTM) Pharmacist     Medication Therapy Recommendations  Type 2 diabetes mellitus (H)    Current Medication: pioglitazone (ACTOS) 15 MG tablet   Rationale: Synergistic therapy - Needs additional medication therapy - Indication   Recommendation: Start Medication   Status: Accepted per CPA

## 2024-09-18 NOTE — PATIENT INSTRUCTIONS
"Recommendations from today's MTM visit:                                                         Start Actos 30mg once daily     Follow-up: Return in about 3 months (around 12/18/2024).    It was great speaking with you today.  I value your experience and would be very thankful for your time in providing feedback in our clinic survey. In the next few days, you may receive an email or text message from [facility] [employer] with a link to a survey related to your  clinical pharmacist.\"     To schedule another MTM appointment, please call the clinic directly or you may call the MTM scheduling line at [phone].    My Clinical Pharmacist's contact information:                                                      Please feel free to contact me with any questions or concerns you have.      Zelda Porter, PharmD  Medication Therapy Management (MTM) Pharmacist   "

## 2024-09-29 ENCOUNTER — HEALTH MAINTENANCE LETTER (OUTPATIENT)
Age: 54
End: 2024-09-29

## 2024-10-25 DIAGNOSIS — E11.69 TYPE 2 DIABETES MELLITUS WITH OTHER SPECIFIED COMPLICATION, WITHOUT LONG-TERM CURRENT USE OF INSULIN (H): ICD-10-CM

## 2024-10-25 RX ORDER — PIOGLITAZONE 15 MG/1
30 TABLET ORAL DAILY
Qty: 180 TABLET | Refills: 1 | Status: SHIPPED | OUTPATIENT
Start: 2024-10-25

## 2024-11-26 ENCOUNTER — MYC REFILL (OUTPATIENT)
Dept: PHARMACY | Facility: CLINIC | Age: 54
End: 2024-11-26
Payer: COMMERCIAL

## 2024-11-26 DIAGNOSIS — E11.69 TYPE 2 DIABETES MELLITUS WITH OTHER SPECIFIED COMPLICATION, WITHOUT LONG-TERM CURRENT USE OF INSULIN (H): ICD-10-CM

## 2024-11-27 RX ORDER — GLIPIZIDE 10 MG/1
20 TABLET, FILM COATED, EXTENDED RELEASE ORAL DAILY
Qty: 180 TABLET | Refills: 1 | Status: SHIPPED | OUTPATIENT
Start: 2024-11-27

## 2024-12-21 DIAGNOSIS — E11.69 TYPE 2 DIABETES MELLITUS WITH OTHER SPECIFIED COMPLICATION, WITHOUT LONG-TERM CURRENT USE OF INSULIN (H): ICD-10-CM

## 2024-12-22 DIAGNOSIS — I10 ESSENTIAL (PRIMARY) HYPERTENSION: ICD-10-CM

## 2024-12-23 RX ORDER — LISINOPRIL 20 MG/1
20 TABLET ORAL DAILY
Qty: 90 TABLET | Refills: 0 | Status: SHIPPED | OUTPATIENT
Start: 2024-12-23

## 2024-12-23 RX ORDER — EMPAGLIFLOZIN 25 MG/1
25 TABLET, FILM COATED ORAL DAILY
Qty: 90 TABLET | Refills: 1 | Status: SHIPPED | OUTPATIENT
Start: 2024-12-23

## 2025-01-20 ENCOUNTER — MYC MEDICAL ADVICE (OUTPATIENT)
Dept: PHARMACY | Facility: CLINIC | Age: 55
End: 2025-01-20
Payer: COMMERCIAL

## 2025-01-20 DIAGNOSIS — E11.69 TYPE 2 DIABETES MELLITUS WITH OTHER SPECIFIED COMPLICATION, WITHOUT LONG-TERM CURRENT USE OF INSULIN (H): ICD-10-CM

## 2025-01-20 RX ORDER — TIRZEPATIDE 15 MG/.5ML
15 INJECTION, SOLUTION SUBCUTANEOUS WEEKLY
Qty: 2 ML | Refills: 0 | Status: SHIPPED | OUTPATIENT
Start: 2025-01-20 | End: 2025-01-21

## 2025-01-21 DIAGNOSIS — E11.69 TYPE 2 DIABETES MELLITUS WITH OTHER SPECIFIED COMPLICATION, WITHOUT LONG-TERM CURRENT USE OF INSULIN (H): ICD-10-CM

## 2025-01-21 RX ORDER — TIRZEPATIDE 15 MG/.5ML
15 INJECTION, SOLUTION SUBCUTANEOUS WEEKLY
Qty: 2 ML | Refills: 0 | Status: SHIPPED | OUTPATIENT
Start: 2025-01-21

## 2025-02-10 DIAGNOSIS — E11.69 TYPE 2 DIABETES MELLITUS WITH OTHER SPECIFIED COMPLICATION, WITHOUT LONG-TERM CURRENT USE OF INSULIN (H): ICD-10-CM

## 2025-02-10 RX ORDER — TIRZEPATIDE 15 MG/.5ML
15 INJECTION, SOLUTION SUBCUTANEOUS WEEKLY
Qty: 2 ML | Refills: 0 | Status: SHIPPED | OUTPATIENT
Start: 2025-02-10

## 2025-02-12 DIAGNOSIS — Z12.5 SCREENING FOR PROSTATE CANCER: Primary | ICD-10-CM

## 2025-02-12 DIAGNOSIS — E11.69 TYPE 2 DIABETES MELLITUS WITH OTHER SPECIFIED COMPLICATION, WITHOUT LONG-TERM CURRENT USE OF INSULIN (H): ICD-10-CM

## 2025-02-12 DIAGNOSIS — E78.49 OTHER HYPERLIPIDEMIA: ICD-10-CM

## 2025-02-21 NOTE — PROGRESS NOTES
Medication Therapy Management (MTM) Encounter    ASSESSMENT:                            Medication Adherence/Access: No issues identified.    Diabetes   Patient is not meeting A1c goal of < 7%. Unchanged from last visit despite dose increase of pioglitazone and max dose of metformin, Mounjaro, glipizide, and Jardiance and diet/exercise are optimized. We discussed that another dose increase of Actos may not be enough to bring his A1c to goal and recommended switching to insulin instead. If he responds well to this, we may be able to discontinue glipizide as well as I am not sure this is benefiting him at this point. Educated on symptoms of hypoglycemia as well.   Microalbumin is at goal < 30mg/g.    Hypertension   Blood pressure at goal <130/80.      Hyperlipidemia   LDL at goal <70 on moderate intensity statin.     ED   Stable.     PLAN:                            Stop pioglitazone  Start Lantus 10 units at bedtime. Increase by 2 units every 3 days until fasting blood glucose are consistently less than 130s.  If we see good results on Lantus, can likely stop glipizide in the near future too.     Follow-up: Return in 3 months (on 5/29/2025) for Follow up, with me, in person.    SUBJECTIVE/OBJECTIVE:                          Biju Neely is a 54 year old male seen for a follow-up visit from 9/18/24.       Reason for visit: comprehensive medication review     Allergies/ADRs: Reviewed in chart  Past Medical History: Reviewed in chart  Tobacco: He reports that he has never smoked. He has never used smokeless tobacco.  Alcohol: Less than 1 beverages / week    Medication Adherence/Access: no issues reported.    Diabetes   Metformin 1,000mg twice daily   Mounjaro 15mg weekly - metallic taste, diarrhea the day after injection, manageable  Glipizide XL 20mg daily  Jardiance 25mg daily   Actos 30mg once daily     Reports he has never had low blood glucose.     Diet: lots of protein, fruits/veg, natural carbohydrates.  Potatoes 1-2 times a week, not a lot of bread or pastas. Does not eat sweets or alcohol often. Does not eat out often either.   Activity: basketball 3-4x/week, less walking in the winter but overall reports still active     Blood sugar monitorin time(s) daily; Ranges: (patient reported) Fasting- 130-140s, other days 180-190s. Not over 200s.    Eye exam is up to date  Foot exam: due    Hypertension   Lisinopril 20mg once daily     Patient reports no current medication side effects  Patient does not self-monitor blood pressure.       Hyperlipidemia   Simvastatin 40mg at bedtime   Patient reports no significant myalgias or other side effects.  The 10-year ASCVD risk score (Davina LYLES, et al., 2019) is: 7.4%    Values used to calculate the score:      Age: 54 years      Sex: Male      Is Non- : No      Diabetic: Yes      Tobacco smoker: No      Systolic Blood Pressure: 118 mmHg      Is BP treated: Yes      HDL Cholesterol: 58 mg/dL      Total Cholesterol: 148 mg/dL     ED   Tadalafil 20mg - 0.5-1 tab once daily as needed   Effective, denies side effects      Today's Vitals: /70   Wt (!) 324 lb (147 kg)   BMI 37.44 kg/m    ----------------    I spent 20 minutes with this patient today. All changes were made via collaborative practice agreement with Andres Dc MD.     A summary of these recommendations was sent via [employer].    Debbie FischerD  Medication Therapy Management (MTM) Pharmacist         Medication Therapy Recommendations  Type 2 diabetes mellitus (H)   1 Current Medication: pioglitazone (ACTOS) 15 MG tablet (Discontinued)   Current Medication Sig: TAKE 2 TABLETS (30 MG) BY MOUTH DAILY.   Rationale: More effective medication available - Ineffective medication - Effectiveness   Recommendation: Change Medication - LANTUS SC   Status: Accepted per CPA   Identified Date: 2025 Completed Date: 2025

## 2025-02-24 ENCOUNTER — OFFICE VISIT (OUTPATIENT)
Dept: PHARMACY | Facility: CLINIC | Age: 55
End: 2025-02-24
Payer: COMMERCIAL

## 2025-02-24 ENCOUNTER — LAB (OUTPATIENT)
Dept: LAB | Facility: CLINIC | Age: 55
End: 2025-02-24
Payer: COMMERCIAL

## 2025-02-24 ENCOUNTER — OFFICE VISIT (OUTPATIENT)
Dept: FAMILY MEDICINE | Facility: CLINIC | Age: 55
End: 2025-02-24
Payer: COMMERCIAL

## 2025-02-24 VITALS
BODY MASS INDEX: 36.45 KG/M2 | HEIGHT: 78 IN | SYSTOLIC BLOOD PRESSURE: 118 MMHG | RESPIRATION RATE: 14 BRPM | HEART RATE: 88 BPM | WEIGHT: 315 LBS | TEMPERATURE: 97.7 F | DIASTOLIC BLOOD PRESSURE: 81 MMHG | OXYGEN SATURATION: 98 %

## 2025-02-24 VITALS — SYSTOLIC BLOOD PRESSURE: 118 MMHG | WEIGHT: 315 LBS | DIASTOLIC BLOOD PRESSURE: 70 MMHG | BODY MASS INDEX: 37.44 KG/M2

## 2025-02-24 DIAGNOSIS — E78.49 OTHER HYPERLIPIDEMIA: ICD-10-CM

## 2025-02-24 DIAGNOSIS — E11.69 TYPE 2 DIABETES MELLITUS WITH OTHER SPECIFIED COMPLICATION, WITHOUT LONG-TERM CURRENT USE OF INSULIN (H): Primary | ICD-10-CM

## 2025-02-24 DIAGNOSIS — N52.9 MALE ERECTILE DISORDER: ICD-10-CM

## 2025-02-24 DIAGNOSIS — I10 ESSENTIAL HYPERTENSION: ICD-10-CM

## 2025-02-24 DIAGNOSIS — E66.01 MORBID OBESITY (H): ICD-10-CM

## 2025-02-24 DIAGNOSIS — Z12.5 SCREENING FOR PROSTATE CANCER: ICD-10-CM

## 2025-02-24 DIAGNOSIS — E11.69 TYPE 2 DIABETES MELLITUS WITH OTHER SPECIFIED COMPLICATION, WITHOUT LONG-TERM CURRENT USE OF INSULIN (H): ICD-10-CM

## 2025-02-24 LAB
EST. AVERAGE GLUCOSE BLD GHB EST-MCNC: 183 MG/DL
HBA1C MFR BLD: 8 % (ref 0–5.6)

## 2025-02-24 PROCEDURE — 83036 HEMOGLOBIN GLYCOSYLATED A1C: CPT

## 2025-02-24 PROCEDURE — G0103 PSA SCREENING: HCPCS

## 2025-02-24 PROCEDURE — 36415 COLL VENOUS BLD VENIPUNCTURE: CPT

## 2025-02-24 PROCEDURE — 82570 ASSAY OF URINE CREATININE: CPT

## 2025-02-24 PROCEDURE — 99605 MTMS BY PHARM NP 15 MIN: CPT

## 2025-02-24 PROCEDURE — 99214 OFFICE O/P EST MOD 30 MIN: CPT | Performed by: FAMILY MEDICINE

## 2025-02-24 PROCEDURE — 80053 COMPREHEN METABOLIC PANEL: CPT

## 2025-02-24 PROCEDURE — 80061 LIPID PANEL: CPT

## 2025-02-24 PROCEDURE — 82043 UR ALBUMIN QUANTITATIVE: CPT

## 2025-02-24 PROCEDURE — G2211 COMPLEX E/M VISIT ADD ON: HCPCS | Performed by: FAMILY MEDICINE

## 2025-02-24 PROCEDURE — 99607 MTMS BY PHARM ADDL 15 MIN: CPT

## 2025-02-24 RX ORDER — TIRZEPATIDE 15 MG/.5ML
15 INJECTION, SOLUTION SUBCUTANEOUS WEEKLY
Qty: 6 ML | Refills: 1 | Status: SHIPPED | OUTPATIENT
Start: 2025-02-24

## 2025-02-24 NOTE — PROGRESS NOTES
"  Assessment & Plan     (E11.69) Type 2 diabetes mellitus  (H)  (primary encounter diagnosis)  Comment: Suboptimal control with A1c at 8.0  Plan: PRIMARY CARE FOLLOW-UP SCHEDULING             (E78.49) Hyperlipidemia  Comment: Controlled with simvastatin  Plan:      (I10) Hypertension  Comment: Currently well-controlled on lisinopril  Plan:      (E66.01) Morbid obesity (H)  Comment: Elevated BMI with comorbidity.  Plan:      PLAN:  1.  Patient is also working with our pharmacist, Actos is being held for now, start Lantus, continue other diabetic medication  2.  Patient is going to see what he can do in terms of improved exercise and maintain good diet  3.  Patient has follow-up with our pharmacist  4.  Patient is due for a physical in 6 months  5.  The longitudinal plan of care for the diagnosis(es)/condition(s) as documented were addressed during this visit. Due to the added complexity in care, I will continue to support Biju in the subsequent management and with ongoing continuity of care.            BMI  Estimated body mass index is 37.21 kg/m  as calculated from the following:    Height as of this encounter: 1.981 m (6' 6\").    Weight as of this encounter: 146.1 kg (322 lb).             Subjective   Biju is a 54 year old, presenting for the following health issues:  Diabetes (6 month f/u) and RECHECK      2/24/2025     9:48 AM   Additional Questions   Roomed by Kyler     History of Present Illness       Diabetes:   He presents for follow up of diabetes.  He is checking home blood glucose two times daily.   He checks blood glucose before meals and before and after meals.  Blood glucose is never over 200 and never under 70.  When his blood glucose is low, the patient is asymptomatic for confusion, blurred vision, lethargy and reports not feeling dizzy, shaky, or weak.  He is concerned about other.    He is not experiencing numbness or burning in feet, excessive thirst, blurry vision, weight changes or redness, sores " "or blisters on feet.           He eats 4 or more servings of fruits and vegetables daily.He consumes 1 sweetened beverage(s) daily.He exercises with enough effort to increase his heart rate 10 to 19 minutes per day.  He exercises with enough effort to increase his heart rate 3 or less days per week.   He is taking medications regularly.     Patient comes in for follow-up diabetes and other comorbidities.  Patient is A1c is 8.0, despite the fact that he is on the maximum dose of Mounjaro, metformin, glipizide and Jardiance, he saw pharmacist who recommended discontinuation of Actos and to start Lantus.    I discussed with the patient that some of the medications she is on for diabetes are not optimal, having said that we also do need to get his blood sugars lower, he is going to see what he can do in terms of increased physical activity though in general he has good healthy habits.    I discussed with the patient that Lantus is not necessarily a long-term medication, he could have some weight gain on it we will have to monitor that.  It is also possibility to reintroduce Actos at some time.    Patient's cholesterol is generally well-controlled on simvastatin of note his blood pressure is well-controlled as well    Patient is up-to-date on immunizations overall is very healthy                                Objective    /81   Pulse 88   Temp 97.7  F (36.5  C) (Oral)   Resp 14   Ht 1.981 m (6' 6\")   Wt (!) 146.1 kg (322 lb)   SpO2 98%   BMI 37.21 kg/m    Body mass index is 37.21 kg/m .  Physical Exam               Signed Electronically by: Andres Dc MD    "

## 2025-02-24 NOTE — PATIENT INSTRUCTIONS
"Recommendations from today's MTM visit:                                                         Stop pioglitazone  Start Lantus 10 units at bedtime. Increase by 2 units every 3 days until fasting blood glucose are consistently less than 130s.  If we see good results on Lantus, can likely stop glipizide in the near future too.     Follow-up: Return in 3 months (on 5/29/2025) for Follow up, with me, in person.    It was great speaking with you today.  I value your experience and would be very thankful for your time in providing feedback in our clinic survey. In the next few days, you may receive an email or text message from [employer] with a link to a survey related to your  clinical pharmacist.\"     To schedule another MTM appointment, please call the clinic directly or you may call the MTM scheduling line at [phone].    My Clinical Pharmacist's contact information:                                                      Please feel free to contact me with any questions or concerns you have.      Zelda Porter, PharmD  Medication Therapy Management (MTM) Pharmacist   "

## 2025-02-25 LAB
ALBUMIN SERPL BCG-MCNC: 4.4 G/DL (ref 3.5–5.2)
ALP SERPL-CCNC: 66 U/L (ref 40–150)
ALT SERPL W P-5'-P-CCNC: 39 U/L (ref 0–70)
ANION GAP SERPL CALCULATED.3IONS-SCNC: 11 MMOL/L (ref 7–15)
AST SERPL W P-5'-P-CCNC: 29 U/L (ref 0–45)
BILIRUB SERPL-MCNC: 0.4 MG/DL
BUN SERPL-MCNC: 15.8 MG/DL (ref 6–20)
CALCIUM SERPL-MCNC: 9.5 MG/DL (ref 8.8–10.4)
CHLORIDE SERPL-SCNC: 101 MMOL/L (ref 98–107)
CHOLEST SERPL-MCNC: 136 MG/DL
CREAT SERPL-MCNC: 1.43 MG/DL (ref 0.67–1.17)
CREAT UR-MCNC: 125.8 MG/DL
EGFRCR SERPLBLD CKD-EPI 2021: 58 ML/MIN/1.73M2
FASTING STATUS PATIENT QL REPORTED: YES
FASTING STATUS PATIENT QL REPORTED: YES
GLUCOSE SERPL-MCNC: 231 MG/DL (ref 70–99)
HCO3 SERPL-SCNC: 25 MMOL/L (ref 22–29)
HDLC SERPL-MCNC: 52 MG/DL
LDLC SERPL CALC-MCNC: 50 MG/DL
MICROALBUMIN UR-MCNC: <12 MG/L
MICROALBUMIN/CREAT UR: NORMAL MG/G{CREAT}
NONHDLC SERPL-MCNC: 84 MG/DL
POTASSIUM SERPL-SCNC: 5.1 MMOL/L (ref 3.4–5.3)
PROT SERPL-MCNC: 7.3 G/DL (ref 6.4–8.3)
PSA SERPL DL<=0.01 NG/ML-MCNC: 0.63 NG/ML (ref 0–3.5)
SODIUM SERPL-SCNC: 137 MMOL/L (ref 135–145)
TRIGL SERPL-MCNC: 170 MG/DL

## 2025-02-26 PROBLEM — N18.31 STAGE 3A CHRONIC KIDNEY DISEASE (H): Status: ACTIVE | Noted: 2025-02-26

## 2025-03-02 ENCOUNTER — MYC MEDICAL ADVICE (OUTPATIENT)
Dept: PHARMACY | Facility: CLINIC | Age: 55
End: 2025-03-02
Payer: COMMERCIAL

## 2025-03-02 DIAGNOSIS — E11.69 TYPE 2 DIABETES MELLITUS WITH OTHER SPECIFIED COMPLICATION, WITHOUT LONG-TERM CURRENT USE OF INSULIN (H): Primary | ICD-10-CM

## 2025-03-03 RX ORDER — GLUCOSAMINE HCL/CHONDROITIN SU 500-400 MG
CAPSULE ORAL
Qty: 100 EACH | Refills: 3 | Status: SHIPPED | OUTPATIENT
Start: 2025-03-03

## 2025-03-22 DIAGNOSIS — I10 ESSENTIAL (PRIMARY) HYPERTENSION: ICD-10-CM

## 2025-03-24 RX ORDER — LISINOPRIL 20 MG/1
20 TABLET ORAL DAILY
Qty: 90 TABLET | Refills: 2 | Status: SHIPPED | OUTPATIENT
Start: 2025-03-24

## 2025-03-26 DIAGNOSIS — E78.49 OTHER HYPERLIPIDEMIA: ICD-10-CM

## 2025-03-26 RX ORDER — SIMVASTATIN 40 MG
40 TABLET ORAL
Qty: 90 TABLET | Refills: 2 | Status: SHIPPED | OUTPATIENT
Start: 2025-03-26

## 2025-04-21 ENCOUNTER — MYC MEDICAL ADVICE (OUTPATIENT)
Dept: PHARMACY | Facility: CLINIC | Age: 55
End: 2025-04-21
Payer: COMMERCIAL

## 2025-04-21 DIAGNOSIS — E11.69 TYPE 2 DIABETES MELLITUS WITH OTHER SPECIFIED COMPLICATION, WITHOUT LONG-TERM CURRENT USE OF INSULIN (H): ICD-10-CM

## 2025-04-23 DIAGNOSIS — E11.69 TYPE 2 DIABETES MELLITUS WITH OTHER SPECIFIED COMPLICATION, WITHOUT LONG-TERM CURRENT USE OF INSULIN (H): ICD-10-CM

## 2025-04-23 RX ORDER — PIOGLITAZONE 15 MG/1
30 TABLET ORAL DAILY
Qty: 180 TABLET | Refills: 1 | OUTPATIENT
Start: 2025-04-23

## 2025-05-10 DIAGNOSIS — E11.69 TYPE 2 DIABETES MELLITUS WITH OTHER SPECIFIED COMPLICATION, WITHOUT LONG-TERM CURRENT USE OF INSULIN (H): ICD-10-CM

## 2025-05-12 RX ORDER — EMPAGLIFLOZIN 25 MG/1
25 TABLET, FILM COATED ORAL DAILY
Qty: 90 TABLET | Refills: 2 | Status: SHIPPED | OUTPATIENT
Start: 2025-05-12

## 2025-05-15 ENCOUNTER — MYC MEDICAL ADVICE (OUTPATIENT)
Dept: PHARMACY | Facility: CLINIC | Age: 55
End: 2025-05-15
Payer: COMMERCIAL

## 2025-05-15 DIAGNOSIS — E11.69 TYPE 2 DIABETES MELLITUS WITH OTHER SPECIFIED COMPLICATION, WITHOUT LONG-TERM CURRENT USE OF INSULIN (H): ICD-10-CM

## 2025-05-15 RX ORDER — GLIPIZIDE 10 MG/1
20 TABLET, FILM COATED, EXTENDED RELEASE ORAL DAILY
Qty: 60 TABLET | Refills: 0 | Status: SHIPPED | OUTPATIENT
Start: 2025-05-15

## 2025-05-28 NOTE — PROGRESS NOTES
Medication Therapy Management (MTM) Encounter    ASSESSMENT:                            Medication Adherence/Access: No issues identified.    Diabetes   Patient is not meeting A1c goal of < 7%, improved with addition of insulin. He may be having some component of Type 1 at this point so we can trial a discontinuation of glipizide and monitor blood glucose.   Microalbumin is at goal < 30mg/g.     Hypertension   Blood pressure at goal <130/80.     PLAN:                            Your A1c looks a lot better. Let's have you stop glipizide for 1-2 weeks and during that time I'll have you closely monitor your blood glucose, especially after a meal. If you start to see your sugars increasing, then we should probably stay on glipizide, but if no change then we can discontinue it.    Follow-up: Return in about 3 months (around 2025) for Follow up, with me.    SUBJECTIVE/OBJECTIVE:                          Biju Neely is a 55 year old male seen for a follow-up visit from 25.       Reason for visit: diabetes follow-up     Allergies/ADRs: Reviewed in chart  Past Medical History: Reviewed in chart  Tobacco: He reports that he has never smoked. He has never used smokeless tobacco.  Alcohol: Less than 1 beverages / week    Medication Adherence/Access: no issues reported.    Diabetes   Metformin 1,000mg twice daily   Mounjaro 15mg weekly - metallic taste, diarrhea the day after injection, manageable  Glipizide XL 20mg daily  Jardiance 25mg daily   Lantus 25 units once daily    Discussed CGM today. Patient will consider.     Diet: lots of protein, fruits/veg, natural carbohydrates. Potatoes 1-2 times a week, not a lot of bread or pastas. Does not eat sweets or alcohol often. Does not eat out often either.   Activity: basketball 3-4x/week, less walking in the winter but overall reports still active     Blood sugar monitorin time(s) daily; Ranges: (patient reported) Fasting- 115-125, lowest 108 or 112. Sometimes  135.  Random blood glucose during the day 120s-140s  Eye exam in the last 12 months? No  Foot exam: due    Hypertension   Lisinopril 20mg once daily     Patient reports no current medication side effects  Patient does not self-monitor blood pressure.       Today's Vitals: /70   Wt (!) 335 lb 11.2 oz (152.3 kg)   BMI 38.79 kg/m    ----------------    I spent 30 minutes with this patient today. All changes were made via collaborative practice agreement with Andres Dc MD.     A summary of these recommendations was sent via [employer].    Zelda Porter, PharmD  Medication Therapy Management (MTM) Pharmacist         Medication Therapy Recommendations  Type 2 diabetes mellitus (H)   1 Current Medication: glipiZIDE (GLUCOTROL XL) 10 MG 24 hr tablet   Current Medication Sig: Take 2 tablets (20 mg) by mouth daily.   Rationale: Condition refractory to medication - Ineffective medication - Effectiveness   Recommendation: Discontinue Medication   Status: Accepted per CPA   Identified Date: 5/29/2025 Completed Date: 5/29/2025

## 2025-05-29 ENCOUNTER — OFFICE VISIT (OUTPATIENT)
Dept: PHARMACY | Facility: CLINIC | Age: 55
End: 2025-05-29
Payer: COMMERCIAL

## 2025-05-29 VITALS — DIASTOLIC BLOOD PRESSURE: 70 MMHG | BODY MASS INDEX: 38.79 KG/M2 | SYSTOLIC BLOOD PRESSURE: 102 MMHG | WEIGHT: 315 LBS

## 2025-05-29 DIAGNOSIS — E11.69 TYPE 2 DIABETES MELLITUS WITH OTHER SPECIFIED COMPLICATION, WITHOUT LONG-TERM CURRENT USE OF INSULIN (H): Primary | ICD-10-CM

## 2025-05-29 DIAGNOSIS — I10 ESSENTIAL HYPERTENSION: ICD-10-CM

## 2025-05-29 LAB
ANION GAP SERPL CALCULATED.3IONS-SCNC: 10 MMOL/L (ref 7–15)
BUN SERPL-MCNC: 12 MG/DL (ref 6–20)
CALCIUM SERPL-MCNC: 9.2 MG/DL (ref 8.8–10.4)
CHLORIDE SERPL-SCNC: 102 MMOL/L (ref 98–107)
CREAT SERPL-MCNC: 1.28 MG/DL (ref 0.67–1.17)
EGFRCR SERPLBLD CKD-EPI 2021: 65 ML/MIN/1.73M2
EST. AVERAGE GLUCOSE BLD GHB EST-MCNC: 166 MG/DL
GLUCOSE SERPL-MCNC: 173 MG/DL (ref 70–99)
HBA1C MFR BLD: 7.4 % (ref 0–5.6)
HCO3 SERPL-SCNC: 26 MMOL/L (ref 22–29)
POTASSIUM SERPL-SCNC: 4.7 MMOL/L (ref 3.4–5.3)
SODIUM SERPL-SCNC: 138 MMOL/L (ref 135–145)

## 2025-05-29 NOTE — PATIENT INSTRUCTIONS
"Recommendations from today's MTM visit:                                                       Hi Gene,    Your A1c looks a lot better. Let's have you stop glipizide for 1-2 weeks and during that time I'll have you closely monitor your blood glucose, especially after a meal. If you start to see your sugars increasing, then we should probably stay on glipizide, but if no change then we can discontinue it.    Let me know if any questions!      Follow-up: Return in about 3 months (around 8/29/2025) for Follow up, with me.    It was great speaking with you today.  I value your experience and would be very thankful for your time in providing feedback in our clinic survey. In the next few days, you may receive an email or text message from [employer] with a link to a survey related to your  clinical pharmacist.\"     To schedule another MTM appointment, please call the clinic directly or you may call the MTM scheduling line at [phone].    My Clinical Pharmacist's contact information:                                                      Please feel free to contact me with any questions or concerns you have.      Zelda Porter, PharmD  Medication Therapy Management (MTM) Pharmacist   "

## 2025-06-08 DIAGNOSIS — E11.69 TYPE 2 DIABETES MELLITUS WITH OTHER SPECIFIED COMPLICATION, WITHOUT LONG-TERM CURRENT USE OF INSULIN (H): ICD-10-CM

## 2025-06-09 RX ORDER — GLIPIZIDE 10 MG/1
20 TABLET, FILM COATED, EXTENDED RELEASE ORAL DAILY
Qty: 180 TABLET | Refills: 1 | Status: SHIPPED | OUTPATIENT
Start: 2025-06-09

## 2025-06-19 DIAGNOSIS — E11.69 TYPE 2 DIABETES MELLITUS WITH OTHER SPECIFIED COMPLICATION, WITHOUT LONG-TERM CURRENT USE OF INSULIN (H): ICD-10-CM

## 2025-08-25 DIAGNOSIS — E11.69 TYPE 2 DIABETES MELLITUS WITH OTHER SPECIFIED COMPLICATION, WITHOUT LONG-TERM CURRENT USE OF INSULIN (H): ICD-10-CM

## 2025-08-26 RX ORDER — PIOGLITAZONE 15 MG/1
30 TABLET ORAL DAILY
Qty: 180 TABLET | Refills: 1 | OUTPATIENT
Start: 2025-08-26

## 2025-08-26 RX ORDER — INSULIN GLARGINE 100 [IU]/ML
INJECTION, SOLUTION SUBCUTANEOUS
Qty: 15 ML | Refills: 5 | Status: SHIPPED | OUTPATIENT
Start: 2025-08-26

## 2025-08-26 RX ORDER — TIRZEPATIDE 15 MG/.5ML
15 INJECTION, SOLUTION SUBCUTANEOUS WEEKLY
Qty: 3 ML | Refills: 5 | Status: SHIPPED | OUTPATIENT
Start: 2025-08-26

## (undated) DEVICE — BANDAGE ADH LF 1X3 ABN3100A

## (undated) DEVICE — TUBING SMOKE EVAC PNEUMOCLEAR HIGH FLOW 0620050250

## (undated) DEVICE — PLATE GROUNDING ADULT W/CORD 9165L

## (undated) DEVICE — SYR 50ML LL W/O NDL 309653

## (undated) DEVICE — DRSG STERI STRIP 1/2X4" R1547

## (undated) DEVICE — GOWN IMPERVIOUS BREATHABLE SMART LG 89015

## (undated) DEVICE — SUTURE VICRYL+ 4-0 UNDYED PS-2 VCP496H

## (undated) DEVICE — SOL RINGERS LACTATED 1000ML BAG 2B2324X

## (undated) DEVICE — GOWN IMPERVIOUS BREATHABLE 2XL/XLONG

## (undated) DEVICE — GLOVE BIOGEL PI SZ 8.0 40880

## (undated) DEVICE — GLOVE BIOGEL PI ULTRATOUCH G SZ 6.5 42165

## (undated) DEVICE — SYSTEM LAPAROVUE VISIBILITY LAPVUE10

## (undated) DEVICE — CUSTOM PACK LAP CHOLE SBA5BLCHEA

## (undated) DEVICE — DECANTER VIAL 2006S

## (undated) DEVICE — ENDO TROCAR SLEEVE KII Z-THREADED 05X100MM CTS02

## (undated) DEVICE — SYR 30ML LL W/O NDL 302832

## (undated) DEVICE — ENDO TROCAR OPTICAL ACCESS KII Z-THRD 05X100MM CTR03

## (undated) DEVICE — DRSG PRIMAPORE 03 1/8X6" 66000318

## (undated) DEVICE — SOL WATER IRRIG 1000ML BOTTLE 2F7114

## (undated) DEVICE — BLADE KNIFE SURG 11 371111

## (undated) DEVICE — ENDO SHEARS RENEW LAP ENDOCUT SCISSOR TIP 16.5MM 3142

## (undated) DEVICE — NEEDLE SPINAL DISP 22GA X 3.5" QUINCKE 333320

## (undated) RX ORDER — ONDANSETRON 2 MG/ML
INJECTION INTRAMUSCULAR; INTRAVENOUS
Status: DISPENSED
Start: 2023-07-05

## (undated) RX ORDER — FENTANYL CITRATE 50 UG/ML
INJECTION, SOLUTION INTRAMUSCULAR; INTRAVENOUS
Status: DISPENSED
Start: 2023-07-05

## (undated) RX ORDER — GLYCOPYRROLATE 0.2 MG/ML
INJECTION, SOLUTION INTRAMUSCULAR; INTRAVENOUS
Status: DISPENSED
Start: 2023-07-05

## (undated) RX ORDER — PROPOFOL 10 MG/ML
INJECTION, EMULSION INTRAVENOUS
Status: DISPENSED
Start: 2023-07-05

## (undated) RX ORDER — BUPIVACAINE HYDROCHLORIDE 2.5 MG/ML
INJECTION, SOLUTION EPIDURAL; INFILTRATION; INTRACAUDAL
Status: DISPENSED
Start: 2023-07-05

## (undated) RX ORDER — BUPIVACAINE HYDROCHLORIDE 2.5 MG/ML
INJECTION, SOLUTION INFILTRATION; PERINEURAL
Status: DISPENSED
Start: 2023-07-05

## (undated) RX ORDER — DEXAMETHASONE SODIUM PHOSPHATE 10 MG/ML
INJECTION, SOLUTION INTRAMUSCULAR; INTRAVENOUS
Status: DISPENSED
Start: 2023-07-05

## (undated) RX ORDER — NEOSTIGMINE METHYLSULFATE 1 MG/ML
VIAL (ML) INJECTION
Status: DISPENSED
Start: 2023-07-05

## (undated) RX ORDER — LIDOCAINE HYDROCHLORIDE 10 MG/ML
INJECTION, SOLUTION EPIDURAL; INFILTRATION; INTRACAUDAL; PERINEURAL
Status: DISPENSED
Start: 2023-07-05